# Patient Record
Sex: MALE | Race: WHITE | NOT HISPANIC OR LATINO | Employment: OTHER | URBAN - METROPOLITAN AREA
[De-identification: names, ages, dates, MRNs, and addresses within clinical notes are randomized per-mention and may not be internally consistent; named-entity substitution may affect disease eponyms.]

---

## 2017-02-13 ENCOUNTER — CLINICAL SUPPORT NO REQUIREMENTS (OUTPATIENT)
Dept: CARDIOLOGY | Facility: CLINIC | Age: 72
End: 2017-02-13

## 2017-02-13 DIAGNOSIS — Z95.818 PRESENCE OF CARDIAC DEVICE: Primary | ICD-10-CM

## 2017-02-13 PROCEDURE — 93298 REM INTERROG DEV EVAL SCRMS: CPT | Performed by: INTERNAL MEDICINE

## 2017-02-14 ENCOUNTER — TELEPHONE (OUTPATIENT)
Dept: CARDIOLOGY | Facility: CLINIC | Age: 72
End: 2017-02-14

## 2017-02-15 ENCOUNTER — TELEPHONE (OUTPATIENT)
Dept: CARDIOLOGY | Facility: CLINIC | Age: 72
End: 2017-02-15

## 2017-02-15 DIAGNOSIS — I48.19 ATRIAL FIBRILLATION, PERSISTENT (HCC): Primary | ICD-10-CM

## 2017-02-15 NOTE — TELEPHONE ENCOUNTER
Called spoke with patient. He is going to come tomorrow after work about 4:00 for EKG only and to get samples of Eliquis 5mg one tablet BID.     Please schedule for cardiovert in 3 weeks.

## 2017-02-15 NOTE — TELEPHONE ENCOUNTER
Is in persistent afib on Linq loop recorder,  Needs ECG.  CHADS2 is high given recent CVA.  Will need AC such as coumadin or eliquis.  Start on eliquis for the time being at 5 mg po bid.  Give samples.  Cr 0.8, 275 lb.    Consider cardiovert in 3 weeks.

## 2017-02-16 ENCOUNTER — CLINICAL SUPPORT (OUTPATIENT)
Dept: CARDIOLOGY | Facility: CLINIC | Age: 72
End: 2017-02-16

## 2017-02-16 DIAGNOSIS — I48.19 ATRIAL FIBRILLATION, PERSISTENT (HCC): ICD-10-CM

## 2017-02-16 NOTE — TELEPHONE ENCOUNTER
Orders entered for 24 holter and ECG.  Will call patient in AM to let him know updated plan of care and that depending on ECG and holter results, Dr. Gomez will further evaluate need for cardioversion.

## 2017-02-17 DIAGNOSIS — I48.0 PAROXYSMAL ATRIAL FIBRILLATION (HCC): Primary | ICD-10-CM

## 2017-02-17 PROCEDURE — 93000 ELECTROCARDIOGRAM COMPLETE: CPT | Performed by: INTERNAL MEDICINE

## 2017-02-17 RX ORDER — PROPAFENONE HYDROCHLORIDE 150 MG/1
150 TABLET, COATED ORAL 2 TIMES DAILY
Qty: 60 TABLET | Refills: 6 | Status: SHIPPED | OUTPATIENT
Start: 2017-02-17 | End: 2021-01-01

## 2017-02-18 NOTE — TELEPHONE ENCOUNTER
ECG today shows NSR.  No afib.  Will start low dose rythmol 150 mg po bid, ordered.  F/u ECG and Holter in one week.  Needs OV with me in next 2 weeks.  Can move somebody.

## 2017-02-18 NOTE — PROGRESS NOTES
Procedure     ECG 12 Lead  Date/Time: 2/17/2017 7:19 PM  Performed by: LAURE CORNELIUS JR  Authorized by: LAURE CORNELIUS JR   Comparison: compared with previous ECG   Similar to previous ECG  Rhythm: sinus rhythm  Ectopy: atrial premature contractions  BPM: 70  Conduction: right bundle branch block  Conduction comments: Left axis deviation  Clinical impression: abnormal ECG

## 2017-03-09 ENCOUNTER — CLINICAL SUPPORT (OUTPATIENT)
Dept: CARDIOLOGY | Facility: CLINIC | Age: 72
End: 2017-03-09

## 2017-03-09 ENCOUNTER — HOSPITAL ENCOUNTER (OUTPATIENT)
Dept: CARDIOLOGY | Facility: HOSPITAL | Age: 72
Discharge: HOME | End: 2017-03-09
Attending: INTERNAL MEDICINE | Admitting: INTERNAL MEDICINE

## 2017-03-09 DIAGNOSIS — I48.19 ATRIAL FIBRILLATION, PERSISTENT (HCC): ICD-10-CM

## 2017-03-09 PROCEDURE — 93226 XTRNL ECG REC<48 HR SCAN A/R: CPT

## 2017-03-09 PROCEDURE — 93225 XTRNL ECG REC<48 HRS REC: CPT

## 2017-03-14 PROCEDURE — 93227 XTRNL ECG REC<48 HR R&I: CPT | Performed by: INTERNAL MEDICINE

## 2017-03-17 ENCOUNTER — OFFICE VISIT (OUTPATIENT)
Dept: CARDIOLOGY | Facility: CLINIC | Age: 72
End: 2017-03-17

## 2017-03-17 VITALS
HEIGHT: 71 IN | WEIGHT: 273 LBS | HEART RATE: 74 BPM | BODY MASS INDEX: 38.22 KG/M2 | DIASTOLIC BLOOD PRESSURE: 78 MMHG | SYSTOLIC BLOOD PRESSURE: 152 MMHG

## 2017-03-17 DIAGNOSIS — R53.82 CHRONIC FATIGUE: ICD-10-CM

## 2017-03-17 DIAGNOSIS — I10 ESSENTIAL HYPERTENSION: ICD-10-CM

## 2017-03-17 DIAGNOSIS — I69.30 CHRONIC ISCHEMIC RIGHT MCA STROKE: ICD-10-CM

## 2017-03-17 DIAGNOSIS — R06.02 BREATH SHORTNESS: ICD-10-CM

## 2017-03-17 DIAGNOSIS — E78.5 HYPERLIPIDEMIA, UNSPECIFIED HYPERLIPIDEMIA TYPE: Primary | ICD-10-CM

## 2017-03-17 DIAGNOSIS — G47.33 OBSTRUCTIVE SLEEP APNEA SYNDROME: ICD-10-CM

## 2017-03-17 DIAGNOSIS — I87.8 VENOUS STASIS: ICD-10-CM

## 2017-03-17 PROBLEM — N20.0 CALCULUS OF KIDNEY: Status: ACTIVE | Noted: 2017-03-17

## 2017-03-17 PROCEDURE — 99214 OFFICE O/P EST MOD 30 MIN: CPT | Performed by: INTERNAL MEDICINE

## 2017-03-17 RX ORDER — FUROSEMIDE 40 MG/1
TABLET ORAL
COMMUNITY
Start: 2012-03-15 | End: 2017-03-17

## 2017-03-17 NOTE — PROGRESS NOTES
[facility]  Cardiology Office Visit Note        Subjective:     Encounter Date:2017      Patient ID: Jesus Izaguirre Jr.   Age: 71 y.o.  Sex: male  :  1945  MRN: [id number]             Date of Office Visit: 2017  Encounter Provider: Wolfgang Gomez MD  Place of Service: [facility]     .    Chief Complaint:  History of Present Illness    The following portions of the patient's history were reviewed and updated as appropriate: allergies, current medications, past family history, past medical history, past social history, past surgical history and problem list.    Review of Systems   Constitution: Negative for chills, fever and weight gain.   HENT: Negative for congestion, headaches, hearing loss and sore throat.    Eyes: Negative for blurred vision and double vision.   Cardiovascular: Negative for chest pain, claudication, cyanosis, dyspnea on exertion, irregular heartbeat, leg swelling, near-syncope, orthopnea, palpitations, paroxysmal nocturnal dyspnea and syncope.   Respiratory: Positive for shortness of breath and snoring. Negative for cough and wheezing.    Endocrine: Negative for cold intolerance.   Hematologic/Lymphatic: Negative for adenopathy. Does not bruise/bleed easily.   Skin: Negative for rash.   Musculoskeletal: Negative for back pain.   Gastrointestinal: Negative for abdominal pain, change in bowel habit, constipation, diarrhea, nausea and vomiting.   Genitourinary: Negative for dysuria, frequency, hematuria and hesitancy.   Neurological: Negative for disturbances in coordination, excessive daytime sleepiness, dizziness, focal weakness, light-headedness, loss of balance and numbness.   Psychiatric/Behavioral: Negative for depression and memory loss. The patient is not nervous/anxious.    Allergic/Immunologic: Negative for hives.       Procedures               HPI     The patient is a 71-year-old white male status post  ischemic stroke with mild hemorrhagic conversion resulting in left hemiparesis and dysphagia in December 2014 now with no residual, history of hypertension, diabetes, hyperlipidemia, sleep apnea presents for cardiac follow-up.  I last saw him in the office in August 2015.  Since then, he denies chest pain.  No PND, orthopnea or pedal edema.  He uses CPAP for sleep apnea every night.  No palpitations dizziness or syncope.    He was found to be in atrial fibrillation with controlled ventricular response.  He was put on Eliquis.  He was then put on Rythmol 150 mg by mouth twice a day after was known that he converted spontaneously back to sinus rhythm.  Clinically he has remained in sinus rhythm.   His main complaint disease of fatigability with exertion, mainly when he walks a long distance, such as when he went to the fair recently.  He sat down and rested and that his energy back.  He does not exercise mainly because his knees hurt.  His primary care physician has been injecting his knees with steroids and they feel better but then they get bad again.    Cardiac risk factors: Positive for hypertension, hyperlipidemia and diabetes.  No smoking since December 2014.  No family history of early CAD.          Past Medical History   Diagnosis Date   • Asthma    • BPH (benign prostatic hypertrophy)    • Diabetes mellitus    • Fatigue    • HLD (hyperlipidemia) 7/5/2016   • Hypertension    • Nephrolithiasis    • Stroke 12/2015   • Venous insufficiency        Past Surgical History   Procedure Laterality Date   • Lithotripsy       Renal       Social History     Social History   • Marital status:      Spouse name: N/A   • Number of children: N/A   • Years of education: N/A     Occupational History   • Not on file.     Social History Main Topics   • Smoking status: Former Smoker     Packs/day: 1.50   • Smokeless tobacco: Not on file      Comment: Cessation 12/2014; HX of 1 ppd for 52 yrs.   • Alcohol use No   • Drug use:  "No   • Sexual activity: Not on file     Other Topics Concern   • Not on file     Social History Narrative       Family History   Problem Relation Age of Onset   • Arthritis Mother    • Cancer Mother    • Migraines Mother    • Hypertension Other            Scheduled Meds:  Current Outpatient Prescriptions on File Prior to Visit   Medication Sig Dispense Refill   • amLODIPine-benazepril (LOTREL) 10-40 MG per capsule Take 1 capsule by mouth daily. 90 capsule 3   • aspirin 325 MG tablet Take 1 tablet by mouth daily.     • atorvastatin (LIPITOR) 80 MG tablet TAKE ONE TABLET BY MOUTH EVERY NIGHT AT BEDTIME 90 tablet 1   • furosemide (LASIX) 40 MG tablet TAKE ONE TABLET BY MOUTH DAILY 90 tablet 1   • glucose blood test strip Amber Contour Next Test In Vitro Strip; Patient Sig: Amber Contour Next Test In Vitro Strip TEST ONCE DAILY; 50; 3; 04-Mar-2015; Active     • HYDROcodone-acetaminophen (NORCO) 5-325 MG per tablet Take 1 tablet by mouth every 6 (six) hours as needed for moderate pain (4-6). 20 tablet 0   • metFORMIN (GLUCOPHAGE) 500 MG tablet TAKE ONE TABLET BY MOUTH TWICE A DAY WITH MEALS 180 tablet 1   • propafenone (RYTHMOL) 150 MG tablet Take 1 tablet by mouth 2 (Two) Times a Day. 60 tablet 6   • vitamin B-12 (CYANOCOBALAMIN) 1000 MCG tablet Take 1,000 mcg by mouth daily.     • vitamin D (CHOLECALCIFEROL) 400 UNITS tablet Take 400 Units by mouth daily.       No current facility-administered medications on file prior to visit.        Visit Vitals   • /78   • Pulse 74   • Ht 71\" (180.3 cm)   • Wt 273 lb (124 kg)   • BMI 38.08 kg/m2       Objective:     Physical Exam   Constitutional: He is oriented to person, place, and time. He appears well-developed and well-nourished. No distress.   HENT:   Head: Normocephalic and atraumatic.   Right Ear: External ear normal.   Left Ear: External ear normal.   Mouth/Throat: Oropharynx is clear and moist. No oropharyngeal exudate.   Eyes: Conjunctivae and EOM are normal. " Pupils are equal, round, and reactive to light. No scleral icterus.   Neck: Normal range of motion. Neck supple. No JVD present. No tracheal deviation present. No thyromegaly present.   Cardiovascular: Normal rate, regular rhythm, S1 normal, S2 normal, normal heart sounds and intact distal pulses.  PMI is not displaced.  Exam reveals no gallop, no distant heart sounds, no friction rub and no decreased pulses.    No murmur heard.  Pulmonary/Chest: Effort normal and breath sounds normal. No accessory muscle usage. No respiratory distress. He has no wheezes. He has no rales. He exhibits no tenderness.   Abdominal: Soft. Bowel sounds are normal. He exhibits no distension and no mass. There is no tenderness. There is no rebound and no guarding.   Musculoskeletal: Normal range of motion. He exhibits no edema, tenderness or deformity.   Lymphadenopathy:     He has no cervical adenopathy.   Neurological: He is alert and oriented to person, place, and time. He has normal reflexes. No cranial nerve deficit. Coordination normal.   Skin: Skin is dry. No rash noted. He is not diaphoretic. No erythema. No pallor.   Psychiatric: He has a normal mood and affect.             Lab Review:               Lab Review:         Lab Review     No results found for: CHOL  Lab Results   Component Value Date    HDL 52 08/22/2016    HDL 48 08/10/2015    HDL 59 04/01/2015     Lab Results   Component Value Date    LDL 48 08/22/2016    LDL 43 08/10/2015    LDL 43 04/01/2015     Lab Results   Component Value Date    TRIG 68 08/22/2016    TRIG 46 08/10/2015    TRIG 50 04/01/2015     No components found for: CHOLHDL  Lab Results   Component Value Date    GLUCOSE 178 (H) 01/02/2015    BUN 21 08/22/2016    CREATININE 0.91 08/22/2016    EGFRIFNONA 84 08/22/2016    EGFRIFAFRI 98 08/22/2016    BCR 23 (H) 08/22/2016    CO2 24 08/22/2016    CALCIUM 9.1 08/22/2016    PROTENTOTREF 6.4 08/22/2016    ALBUMIN 4.0 08/22/2016    LABIL2 1.7 08/22/2016    AST 20  08/22/2016    ALT 29 08/22/2016     Lab Results   Component Value Date    GLUCOSE 178 (H) 01/02/2015    CALCIUM 9.1 08/22/2016     08/22/2016    K 4.3 08/22/2016    CO2 24 08/22/2016     08/22/2016    BUN 21 08/22/2016    CREATININE 0.91 08/22/2016    EGFRIFAFRI 98 08/22/2016    EGFRIFNONA 84 08/22/2016    BCR 23 (H) 08/22/2016     Lab Results   Component Value Date    WBC 8.88 01/01/2015    HGB 7.1 08/22/2016    HCT 46.1 01/01/2015    MCV 88.8 01/01/2015     01/01/2015     No results found for: DDIMER  Lab Results   Component Value Date    TSH 1.580 08/22/2016     No results found for: CKTOTAL  No results found for: DIGOXIN  No results found for: CKTOTAL, CKMB, CKMBINDEX, TROPONINI, TROPONINT  Lab Results   Component Value Date    INR 1.1 01/01/2015    INR 1.0 12/26/2014    PROTIME 12.5 01/01/2015    PROTIME 11.1 12/26/2014     CrCl cannot be calculated (Patient has no serum creatinine result on file.).    Assessment:          Diagnosis Plan   1. Hyperlipidemia, unspecified hyperlipidemia type     2. Essential hypertension     3. Venous stasis     4. Breath shortness     5. Obstructive sleep apnea syndrome     6. Chronic ischemic right MCA stroke     7. Chronic fatigue            Assessment and Plan:    Jesus was seen today for hypertension, hyperlipidemia and slow heart rate.    Diagnoses and all orders for this visit:    Hyperlipidemia, unspecified hyperlipidemia type    Essential hypertension    Venous stasis    Breath shortness    Obstructive sleep apnea syndrome    Chronic ischemic right MCA stroke    Chronic fatigue       The patient is complaining of easy fatigability.  I don't think his symptoms are due to sleep apnea as he wears the CPAP mask religiously.  I think he is out of shape and I told him he needs to exercise more.  I told that exercise means walking at a brisk pace continuously, with a goal exercise 3 times a week at 20 minutes per time.  He could either do the stationary bike  or the treadmill which he has at home.    The patient was recently found to be in atrial fibrillation.  His rate is now controlled in sinus rhythm.  He is not on an antiarrhythmic agent.  A week ago, he was started on Rythmol 150 mg by mouth twice a day to control his rhythm.  His QRS duration is normal.     He'll have an Holter done in 3 weeks.  If he remains in sinus rhythm then I will stop the Eliquis.  He does say that when he started Eliquis he felt better right away.  I think he converted on that day to sinus rhythm. I told him to check his blood pressure and heart rate daily.  I will obtain a Holter monitor in 3 weeks.  If he remains in sinus rhythm without atrial fibrillation then I will stop his Eliquis.  Once we stop his Eliquis, I will put him on low-dose aspirin.    His last lipid profile was excellent.    His blood pressures mildly elevated today.  It is usually in a normal range.      A total of 25 minutes was spent in the care of this patient, including at least 13 minutes face-to-face with the patient.    I not only counseled the patient today on the significant factors noted in the assessment and plan, but I also recommended that the patient reduce salt and saturated animal fat intake in diet, as well as to perform scheduled exercise on a regular basis.      Plan:                  03/17/2017  6:35 PM  MD Wolfgang Marquez MD  3/17/2017, 6:35 PM    EMR Dragon/Transcription disclaimer:   Much of this encounter note is an electronic transcription/translation of spoken language to printed text. The electronic translation of spoken language may permit erroneous, or at times, nonsensical words or phrases to be inadvertently transcribed; Although I have reviewed the note for such errors, some may still exist.

## 2017-03-27 ENCOUNTER — TELEPHONE (OUTPATIENT)
Dept: CARDIOLOGY | Facility: CLINIC | Age: 72
End: 2017-03-27

## 2017-03-27 NOTE — TELEPHONE ENCOUNTER
Holter:  NSR with frequent PACs and PVCs.  One 5-beat run of slow VTach.  No afib on rythmol and eliquis.  No change meds.              Study Description  Monitor hooked-up on 3/9/2017 at 15:46 EST. The monitor was scanned on 3/10/2017. The patient was monitored for 24 hours. Indications for this exam include afib. Total beats: 14596. Average HR: 65. Min HR: 42. Max HR: 124.       Study Findings  No symptoms reported during the monitoring period. No complications noted. The predominant rhythm noted during the testing period was sinus rhythm. Premature atrial contractions occured frequently. There were 288 premature atrial contractions per hour. There was no evidence of atrial arrhythmias. There were no episodes of supraventricular tachycardia. Premature ventricular contractions occured frequently. There were 83 premature ventricular contractions per hour. Ventricular couplets. There was one episodes of ventricular tachycardia. The peak heart rate was 122 beats per minute. One 5-bt run VT.. Sinoatrial node conduction was normal. No atrioventricular block noted.

## 2017-04-07 RX ORDER — ATORVASTATIN CALCIUM 80 MG/1
TABLET, FILM COATED ORAL
Qty: 90 TABLET | Refills: 3 | Status: SHIPPED | OUTPATIENT
Start: 2017-04-07 | End: 2017-09-26

## 2017-05-01 ENCOUNTER — LAB (OUTPATIENT)
Dept: LAB | Facility: HOSPITAL | Age: 72
End: 2017-05-01

## 2017-05-01 ENCOUNTER — OFFICE VISIT (OUTPATIENT)
Dept: NEUROLOGY | Facility: CLINIC | Age: 72
End: 2017-05-01

## 2017-05-01 VITALS
SYSTOLIC BLOOD PRESSURE: 150 MMHG | WEIGHT: 274 LBS | HEIGHT: 71 IN | DIASTOLIC BLOOD PRESSURE: 74 MMHG | HEART RATE: 60 BPM | BODY MASS INDEX: 38.36 KG/M2 | OXYGEN SATURATION: 98 %

## 2017-05-01 DIAGNOSIS — E53.8 VITAMIN B12 DEFICIENCY: ICD-10-CM

## 2017-05-01 DIAGNOSIS — E55.9 VITAMIN D DEFICIENCY: ICD-10-CM

## 2017-05-01 DIAGNOSIS — R41.3 MEMORY IMPAIRMENT: ICD-10-CM

## 2017-05-01 DIAGNOSIS — I69.30 CHRONIC ISCHEMIC RIGHT MCA STROKE: ICD-10-CM

## 2017-05-01 LAB
25(OH)D3 SERPL-MCNC: 12.4 NG/ML (ref 30–100)
FOLATE SERPL-MCNC: 10.81 NG/ML (ref 4.78–24.2)
TSH SERPL DL<=0.05 MIU/L-ACNC: 1.19 MIU/ML (ref 0.27–4.2)
VIT B12 BLD-MCNC: 354 PG/ML (ref 211–946)

## 2017-05-01 PROCEDURE — 82607 VITAMIN B-12: CPT

## 2017-05-01 PROCEDURE — 36415 COLL VENOUS BLD VENIPUNCTURE: CPT

## 2017-05-01 PROCEDURE — 83921 ORGANIC ACID SINGLE QUANT: CPT | Performed by: PSYCHIATRY & NEUROLOGY

## 2017-05-01 PROCEDURE — 82746 ASSAY OF FOLIC ACID SERUM: CPT

## 2017-05-01 PROCEDURE — 84443 ASSAY THYROID STIM HORMONE: CPT

## 2017-05-01 PROCEDURE — 99214 OFFICE O/P EST MOD 30 MIN: CPT | Performed by: PSYCHIATRY & NEUROLOGY

## 2017-05-01 PROCEDURE — 82306 VITAMIN D 25 HYDROXY: CPT | Performed by: PSYCHIATRY & NEUROLOGY

## 2017-05-02 ENCOUNTER — TELEPHONE (OUTPATIENT)
Dept: NEUROLOGY | Facility: CLINIC | Age: 72
End: 2017-05-02

## 2017-05-04 LAB — METHYLMALONATE SERPL-SCNC: 250 NMOL/L (ref 0–378)

## 2017-05-15 ENCOUNTER — CLINICAL SUPPORT NO REQUIREMENTS (OUTPATIENT)
Dept: CARDIOLOGY | Facility: CLINIC | Age: 72
End: 2017-05-15

## 2017-05-15 DIAGNOSIS — Z95.818 PRESENCE OF CARDIAC DEVICE: Primary | ICD-10-CM

## 2017-05-15 PROCEDURE — 93298 REM INTERROG DEV EVAL SCRMS: CPT | Performed by: INTERNAL MEDICINE

## 2017-08-15 ENCOUNTER — CLINICAL SUPPORT NO REQUIREMENTS (OUTPATIENT)
Dept: CARDIOLOGY | Facility: CLINIC | Age: 72
End: 2017-08-15

## 2017-08-15 DIAGNOSIS — Z95.818 PRESENCE OF CARDIAC DEVICE: Primary | ICD-10-CM

## 2017-08-15 PROCEDURE — 93298 REM INTERROG DEV EVAL SCRMS: CPT | Performed by: INTERNAL MEDICINE

## 2017-09-21 ENCOUNTER — OFFICE VISIT (OUTPATIENT)
Dept: FAMILY MEDICINE CLINIC | Facility: CLINIC | Age: 72
End: 2017-09-21

## 2017-09-21 VITALS
BODY MASS INDEX: 38.63 KG/M2 | WEIGHT: 275.9 LBS | TEMPERATURE: 98.6 F | HEART RATE: 58 BPM | RESPIRATION RATE: 14 BRPM | SYSTOLIC BLOOD PRESSURE: 122 MMHG | OXYGEN SATURATION: 99 % | DIASTOLIC BLOOD PRESSURE: 68 MMHG | HEIGHT: 71 IN

## 2017-09-21 DIAGNOSIS — I10 ESSENTIAL HYPERTENSION: ICD-10-CM

## 2017-09-21 DIAGNOSIS — B35.1 ONYCHOMYCOSIS: ICD-10-CM

## 2017-09-21 DIAGNOSIS — E11.42 CONTROLLED TYPE 2 DIABETES MELLITUS WITH DIABETIC POLYNEUROPATHY, WITHOUT LONG-TERM CURRENT USE OF INSULIN (HCC): ICD-10-CM

## 2017-09-21 DIAGNOSIS — Z23 FLU VACCINE NEED: ICD-10-CM

## 2017-09-21 DIAGNOSIS — E78.5 HYPERLIPIDEMIA, UNSPECIFIED HYPERLIPIDEMIA TYPE: Primary | ICD-10-CM

## 2017-09-21 DIAGNOSIS — N40.1 BENIGN PROSTATIC HYPERPLASIA WITH LOWER URINARY TRACT SYMPTOMS, UNSPECIFIED MORPHOLOGY: ICD-10-CM

## 2017-09-21 DIAGNOSIS — G62.9 NEUROPATHY: ICD-10-CM

## 2017-09-21 PROCEDURE — G0008 ADMIN INFLUENZA VIRUS VAC: HCPCS | Performed by: FAMILY MEDICINE

## 2017-09-21 PROCEDURE — 99214 OFFICE O/P EST MOD 30 MIN: CPT | Performed by: FAMILY MEDICINE

## 2017-09-21 PROCEDURE — 90662 IIV NO PRSV INCREASED AG IM: CPT | Performed by: FAMILY MEDICINE

## 2017-09-21 RX ORDER — ASPIRIN 325 MG
325 TABLET ORAL DAILY
Qty: 90 TABLET | Refills: 3 | Status: SHIPPED | OUTPATIENT
Start: 2017-09-21

## 2017-09-21 NOTE — PROGRESS NOTES
Subjective   Jesus Izaguirre Jr. is a 72 y.o. male.     Chief Complaint   Patient presents with   • Follow-up     Patient has 6 months follow up. Patient needs to check prostate.    • Diabetes     Patient needs to check on it.    • Med Refill     Patient needs a refill on aspirin 325 m.        HPI     Patient is here to follow-up on diabetes, hypertension, hyperlipidemia.  Patient is currently taking metformin 500 mg daily despite being prescribed twice a day.  Patient states that he's had some increase in his diabetic symptoms.  He has noticed that he's been urinating a lot more and has also developed some neuropathy in his feet.  Hypertension is currently stable and well controlled on amlodipine/benazepril.  Blood pressure today 122/68.  Patient also takes atorvastatin 80 mg daily for hyperlipidemia..    The following portions of the patient's history were reviewed and updated as appropriate: allergies, current medications, past family history, past medical history, past social history, past surgical history and problem list.    Review of Systems   Constitutional: Negative for activity change.   All other systems reviewed and are negative.      Objective  Vitals:    09/21/17 0953   BP: 122/68   Pulse: 58   Resp: 14   Temp: 98.6 °F (37 °C)   SpO2: 99%        Physical Exam   Constitutional: He is oriented to person, place, and time. He appears well-developed and well-nourished. No distress.   HENT:   Head: Normocephalic and atraumatic.   Right Ear: External ear normal.   Left Ear: External ear normal.   Nose: Nose normal.   Mouth/Throat: Oropharynx is clear and moist.   Eyes: Conjunctivae and EOM are normal. Pupils are equal, round, and reactive to light. Right eye exhibits no discharge. Left eye exhibits no discharge. No scleral icterus.   Cardiovascular: Normal rate, regular rhythm and normal heart sounds.    Pulmonary/Chest: Effort normal and breath sounds normal. No respiratory distress. He has no wheezes. He has  no rales.   Abdominal: Soft. Bowel sounds are normal. He exhibits no distension. There is no tenderness.    Jesus had a diabetic foot exam performed today.   During the foot exam he had a monofilament test performed.    Neurological Sensory Findings - Unaltered hot/cold right ankle/foot discrimination and unaltered hot/cold left ankle/foot discrimination. Unaltered sharp/dull right ankle/foot discrimination and unaltered sharp/dull left ankle/foot discrimination. No right ankle/foot altered proprioception and no left ankle/foot altered proprioception   Skin Integrity  -  He has right foot onychomycosis.He has left foot onychomycosis..  Neurological: He is alert and oriented to person, place, and time.   Skin: Skin is warm and dry. He is not diaphoretic.   Nursing note and vitals reviewed.        Current Outpatient Prescriptions:   •  amLODIPine-benazepril (LOTREL) 10-40 MG per capsule, Take 1 capsule by mouth daily., Disp: 90 capsule, Rfl: 3  •  aspirin 325 MG tablet, Take 1 tablet by mouth Daily., Disp: 90 tablet, Rfl: 3  •  atorvastatin (LIPITOR) 80 MG tablet, TAKE ONE TABLET BY MOUTH EVERY NIGHT AT BEDTIME, Disp: 90 tablet, Rfl: 1  •  furosemide (LASIX) 40 MG tablet, TAKE ONE TABLET BY MOUTH DAILY, Disp: 90 tablet, Rfl: 1  •  metFORMIN (GLUCOPHAGE) 500 MG tablet, TAKE ONE TABLET BY MOUTH TWICE A DAY WITH MEALS, Disp: 180 tablet, Rfl: 1  •  propafenone (RYTHMOL) 150 MG tablet, Take 1 tablet by mouth 2 (Two) Times a Day., Disp: 60 tablet, Rfl: 6  •  vitamin B-12 (CYANOCOBALAMIN) 1000 MCG tablet, Take 1,000 mcg by mouth daily., Disp: , Rfl:   •  vitamin D (CHOLECALCIFEROL) 400 UNITS tablet, Take 400 Units by mouth daily., Disp: , Rfl:   •  atorvastatin (LIPITOR) 80 MG tablet, TAKE ONE TABLET BY MOUTH EVERY NIGHT AT BEDTIME, Disp: 90 tablet, Rfl: 3  •  glucose blood test strip, Amber Contour Next Test In Vitro Strip; Patient Sig: Amber Contour Next Test In Vitro Strip TEST ONCE DAILY; 50; 3; 04-Mar-2015; Active,  Disp: , Rfl:   •  HYDROcodone-acetaminophen (NORCO) 5-325 MG per tablet, Take 1 tablet by mouth every 6 (six) hours as needed for moderate pain (4-6)., Disp: 20 tablet, Rfl: 0    Procedures    Lab Results (most recent)     None          Assessment/Plan   Jesus was seen today for follow-up, diabetes and med refill.    Diagnoses and all orders for this visit:    Hyperlipidemia, unspecified hyperlipidemia type  -     Lipid Panel    Essential hypertension  -     Comprehensive Metabolic Panel    Controlled type 2 diabetes mellitus with diabetic polyneuropathy, without long-term current use of insulin  -     Comprehensive Metabolic Panel  -     Hemoglobin A1c  -     Ambulatory Referral to Podiatry    Benign prostatic hyperplasia with lower urinary tract symptoms, unspecified morphology  -     PSA    Flu vaccine need  -     Flu Vaccine High Dose PF 65YR+    Neuropathy  -     Ambulatory Referral to Podiatry    Onychomycosis  -     Ambulatory Referral to Podiatry    Other orders  -     aspirin 325 MG tablet; Take 1 tablet by mouth Daily.    We'll get labs as above to evaluate current status of medical problems.  I'm concerned that his diabetes is become uncontrolled.  We will refer to podiatry.    Return in about 4 weeks (around 10/19/2017) for Recheck.      Shailesh Souza MD

## 2017-09-22 LAB
ALBUMIN SERPL-MCNC: 3.7 G/DL (ref 3.5–4.8)
ALBUMIN/GLOB SERPL: 1.4 {RATIO} (ref 1.2–2.2)
ALP SERPL-CCNC: 105 IU/L (ref 39–117)
ALT SERPL-CCNC: 21 IU/L (ref 0–44)
AST SERPL-CCNC: 15 IU/L (ref 0–40)
BILIRUB SERPL-MCNC: 0.3 MG/DL (ref 0–1.2)
BUN SERPL-MCNC: 27 MG/DL (ref 8–27)
BUN/CREAT SERPL: 24 (ref 10–24)
CALCIUM SERPL-MCNC: 9.1 MG/DL (ref 8.6–10.2)
CHLORIDE SERPL-SCNC: 99 MMOL/L (ref 96–106)
CHOLEST SERPL-MCNC: 111 MG/DL (ref 100–199)
CO2 SERPL-SCNC: 21 MMOL/L (ref 18–29)
CREAT SERPL-MCNC: 1.12 MG/DL (ref 0.76–1.27)
GLOBULIN SER CALC-MCNC: 2.6 G/DL (ref 1.5–4.5)
GLUCOSE SERPL-MCNC: 346 MG/DL (ref 65–99)
HBA1C MFR BLD: 11.9 % (ref 4.8–5.6)
HDLC SERPL-MCNC: 49 MG/DL
LDLC SERPL CALC-MCNC: 47 MG/DL (ref 0–99)
POTASSIUM SERPL-SCNC: 4.6 MMOL/L (ref 3.5–5.2)
PROT SERPL-MCNC: 6.3 G/DL (ref 6–8.5)
PSA SERPL-MCNC: 2.4 NG/ML (ref 0–4)
SODIUM SERPL-SCNC: 138 MMOL/L (ref 134–144)
TRIGL SERPL-MCNC: 77 MG/DL (ref 0–149)
VLDLC SERPL CALC-MCNC: 15 MG/DL (ref 5–40)

## 2017-09-26 ENCOUNTER — OFFICE VISIT (OUTPATIENT)
Dept: NEUROLOGY | Facility: CLINIC | Age: 72
End: 2017-09-26

## 2017-09-26 VITALS
HEIGHT: 71 IN | SYSTOLIC BLOOD PRESSURE: 148 MMHG | DIASTOLIC BLOOD PRESSURE: 78 MMHG | OXYGEN SATURATION: 97 % | BODY MASS INDEX: 37.66 KG/M2 | HEART RATE: 76 BPM | WEIGHT: 269 LBS

## 2017-09-26 DIAGNOSIS — I69.30 CHRONIC ISCHEMIC RIGHT MCA STROKE: ICD-10-CM

## 2017-09-26 DIAGNOSIS — E78.5 HYPERLIPIDEMIA, UNSPECIFIED HYPERLIPIDEMIA TYPE: ICD-10-CM

## 2017-09-26 DIAGNOSIS — E11.8 TYPE 2 DIABETES MELLITUS WITH COMPLICATION, WITHOUT LONG-TERM CURRENT USE OF INSULIN (HCC): ICD-10-CM

## 2017-09-26 DIAGNOSIS — E55.9 VITAMIN D DEFICIENCY: ICD-10-CM

## 2017-09-26 DIAGNOSIS — E53.8 VITAMIN B12 DEFICIENCY: ICD-10-CM

## 2017-09-26 DIAGNOSIS — I10 ESSENTIAL HYPERTENSION: ICD-10-CM

## 2017-09-26 DIAGNOSIS — Z72.0 TOBACCO ABUSE: ICD-10-CM

## 2017-09-26 PROCEDURE — 99214 OFFICE O/P EST MOD 30 MIN: CPT | Performed by: NURSE PRACTITIONER

## 2017-09-26 NOTE — PROGRESS NOTES
"DOS: 2017  NAME: Jesus Izaguirre Jr.   : 1945  PCP: Shailesh Souza MD    Chief Complaint   Patient presents with   • Stroke        Neurological Problem and Interval History:  72 y.o. RHW male with HTN, DM, tobacco abuse (1PPD), YEISON and stroke. He presents today for his 6 month stroke and memory loss follow up, first encounter.     He denies any new S/S of stroke and no H/As. He has not ha falls.     2014 he woke up and noted he didn't feel right. He had difficulty walking, off balance. Then he tried talking to his wife but his words were coming out slurred. He came to the hospital and after a few days he felt better but he did have out patient ST and PT. He was on a baby ASA at the time of the event. He was D/C on full dose ASA and Lipitor. After his stroke his ST memory got worse. He is otherwise back to his baseline able to manage his own finances and take care of himself.     He has a LINQ placed and was told by Dr. Granados that he goes in to [facility] at night but only for about 20 minutes and it is not a \"big deal.\" He also notes that Dr. Granados placed him on Eliquis but this was too expensive and he needs to FU with Dr. Granados to discuss a different blood thinner. He had another cognitive test in spring of 2017 and his memory had improved since his stroke. He has been taking B12 for this per Dr. Azar. He has not had this retested since May 2017. His BS at home has been around 300. It used to be 's. He recently FU with Dr. Souza and had lab work. He will FU in 30 days to discuss change in medications. His BP is typically high 150's/90. He did not have his BP medications this morning. He notices when his BP is high, he gets a warm tingling fleeing all over.   He is a retired .     Review of Systems:        Review of Systems   Constitutional: Negative for chills, fatigue and fever.   HENT: Negative for hearing loss, tinnitus and trouble swallowing.    Respiratory: Positive for " "apnea. Negative for cough, shortness of breath and wheezing.    Cardiovascular: Positive for leg swelling. Negative for chest pain and palpitations.   Gastrointestinal: Negative for constipation, diarrhea, nausea and vomiting.   Endocrine: Negative for cold intolerance, heat intolerance and polydipsia.   Genitourinary: Negative for decreased urine volume, difficulty urinating, frequency and urgency.   Musculoskeletal: Negative for back pain, gait problem, joint swelling, neck pain and neck stiffness.   Skin: Negative for rash and wound.   Allergic/Immunologic: Negative for environmental allergies and food allergies.   Neurological: Negative for dizziness, tremors, seizures, syncope, facial asymmetry, speech difficulty, weakness, light-headedness and numbness.   Hematological: Negative for adenopathy. Does not bruise/bleed easily.   Psychiatric/Behavioral: Positive for sleep disturbance. Negative for agitation, behavioral problems, confusion, decreased concentration, dysphoric mood, hallucinations, self-injury and suicidal ideas. The patient is not nervous/anxious and is not hyperactive.        \"The following portions of the patient's history were reviewed and updated as appropriate: allergies, current medications, past family history, past medical history, past social history, past surgical history and problem list.\"  Review and Interpretation of Imagin14 PAIGE POSTOPERATIVE DIAGNOSIS:  No obvious cardiac source of embolus.  No patent foramen ovale.   2014 MRI: small RMCA stroke  2014 CTA: bilateral carotid bulb disease/stenosis ~50%     Laboratory Results:             Lab Results   Component Value Date    HGBA1C 11.9 (H) 2017     Lab Results   Component Value Date    HDL 49 2017    HDL 52 2016    HDL 48 08/10/2015     Lab Results   Component Value Date    LDL 47 2017    LDL 48 2016    LDL 43 08/10/2015     Lab Results   Component Value Date    TRIG 77 2017    TRIG " 68 08/22/2016    TRIG 46 08/10/2015   No results found for: RPR  Lab Results   Component Value Date    TSH 1.190 05/01/2017     Lab Results   Component Value Date    ECUBRUHU09 354 05/01/2017       Physical Examination: mRS: Cheryl  General Appearance:   Well developed, morbidly obese, well groomed, alert, and cooperative.  HEENT: Normocephalic.    Neck and Spine: Normal range of motion.  Normal alignment. No mass or tenderness. No bruits.  Cardiac: Regular rate and rhythm. No murmurs.  Peripheral Vasculature: Radial and pedal pulses are equal and symmetric. No signs of distal embolization.  Extremities:    No edema or deformities.       Neurological examination:  Higher Integrative  Function: Oriented to time, place and person. Normal registration, attention span and concentration. Normal language including comprehension, spontaneous speech, repetition, naming and vocabulary. No neglect with normal visual-spatial function and construction. Normal fund of knowledge and higher integrative function.  CN III IV VI: Extraocular movements are full without nystagmus.   CN V: Normal facial sensation and strength of muscles of mastication.  CN VII: Facial movements are symmetric. No weakness.  CN VIII:   Auditory acuity is normal.  CN IX & X:   Symmetric palatal movement.  CN XI: Sternocleidomastoid and trapezius are normal.  No weakness.  CN XII:   The tongue is midline.  No atrophy or fasciculations.  Motor: Normal muscle strength, bulk and tone in upper and lower extremities.  Tremulousness of both hands   Sensation: Normal to light touch, temperature, and proprioception in arms and legs.   Station and Gait: Normal gait and station.    Coordination: Rapid alternating movements are normal.  Heel to shin normal.    Diagnoses / Discussion:  Mr. Izaguirre is a 71yo, new to me, who presented for his 6 month stroke and cognitive follow up. He is 3 years post RMCA infract, etiology unknown. He does have a LINQ and states there has been  some atrial fib. I will get the records from Dr. Granados's office. If there is evidence of atrial fib we recommend anticoagulation. I have discussed this with the patient and he voices understanding. He also needs aggressive risk factor control. I have encouraged him to discuss his BP with Dr. Granados also, it should be <125/80 all of the time. His DM is being managed by Dr. Souza. His cholesterol is being controlled, on Lipitor 80mg. He voiced and we discussed that he has to quit smoking and he wants to. I will discuss the need for repeat vascular imaging with Dr. Edgar, CTA vs carotid U/S, ? Continue antiplatelets with anticoagulation for atherosclerosis. Regarding his memory trouble, this is stable, but I will recheck his Vitamin D and B12 and determine the need for any dose adjustments. I will also go ahead and check to make sure he is responding to the full dose ASA, he was on 81mg ASA at the time of his event in 2014, ? Need for plavix. He needs to continue to modify his diet and stay active. He agrees with the plan and will call with any questions or concerns.     Plan:   Get records from Dr. Granados, ADRIANOQ reports    Recheck vitamin B12, vitamin D, ASA response   Tobacco cessation education 5 min   ? Repeat imaging, CTA or carotid US, will discuss with Dr. Edgar   Aggressive Blood pressure control to <130/80   Goal LDL <70, continue Lipitor 80mg    Serum glucose < 140   Call 911 for stroke any stroke symptoms   Follow-up 6 months   Jesus was seen today for stroke.    Diagnoses and all orders for this visit:    Chronic ischemic right MCA stroke  -     Platelet Response Aspirin; Future    Hyperlipidemia, unspecified hyperlipidemia type    Essential hypertension    Vitamin B12 deficiency  -     Vitamin B12; Future    Vitamin D deficiency  -     Vitamin D 25 Hydroxy; Future    Type 2 diabetes mellitus with complication, without long-term current use of insulin    Tobacco abuse      Coding

## 2017-10-01 ENCOUNTER — RESULTS ENCOUNTER (OUTPATIENT)
Dept: NEUROLOGY | Facility: CLINIC | Age: 72
End: 2017-10-01

## 2017-10-01 DIAGNOSIS — I69.30 CHRONIC ISCHEMIC RIGHT MCA STROKE: ICD-10-CM

## 2017-10-03 ENCOUNTER — OFFICE VISIT (OUTPATIENT)
Dept: FAMILY MEDICINE CLINIC | Facility: CLINIC | Age: 72
End: 2017-10-03

## 2017-10-03 VITALS
WEIGHT: 263 LBS | HEIGHT: 71 IN | OXYGEN SATURATION: 98 % | RESPIRATION RATE: 16 BRPM | BODY MASS INDEX: 36.82 KG/M2 | SYSTOLIC BLOOD PRESSURE: 138 MMHG | HEART RATE: 74 BPM | TEMPERATURE: 99 F | DIASTOLIC BLOOD PRESSURE: 74 MMHG

## 2017-10-03 DIAGNOSIS — T14.8XXA WOUND OF SKIN: ICD-10-CM

## 2017-10-03 DIAGNOSIS — E11.59 TYPE 2 DIABETES MELLITUS WITH OTHER CIRCULATORY COMPLICATION, WITHOUT LONG-TERM CURRENT USE OF INSULIN (HCC): Primary | ICD-10-CM

## 2017-10-03 PROCEDURE — 99214 OFFICE O/P EST MOD 30 MIN: CPT | Performed by: FAMILY MEDICINE

## 2017-10-03 NOTE — PROGRESS NOTES
Subjective   Jesus Izaguirre Jr. is a 72 y.o. male.     Chief Complaint   Patient presents with   • Follow-up     Patient is following up on lab resutls , high diabetes level.        HPI     Patient presents for follow-up on diabetes.  At her last office visit a week ago patient was found to have a hemoglobin A1c of 11.9.  According to records and reviewed his previous hemoglobin A1c's Young around 7.3.  He admits that his diet and exercise have not been appropriate.  He is also developed some ulcerations in his bilateral lower extremities that he states have not been healing quite as well as before.  He denies any fever or chills.    The following portions of the patient's history were reviewed and updated as appropriate: allergies, current medications, past family history, past medical history, past social history, past surgical history and problem list.    Review of Systems   Constitutional: Negative for activity change.   All other systems reviewed and are negative.      Objective  Vitals:    10/03/17 1313   BP: 138/74   Pulse: 74   Resp: 16   Temp: 99 °F (37.2 °C)   SpO2: 98%        Physical Exam   Constitutional: He is oriented to person, place, and time. He appears well-developed and well-nourished. No distress.   HENT:   Head: Normocephalic and atraumatic.   Right Ear: External ear normal.   Left Ear: External ear normal.   Nose: Nose normal.   Mouth/Throat: Oropharynx is clear and moist.   Eyes: Conjunctivae and EOM are normal. Pupils are equal, round, and reactive to light. Right eye exhibits no discharge. Left eye exhibits no discharge. No scleral icterus.   Cardiovascular: Normal rate, regular rhythm and normal heart sounds.    Pulmonary/Chest: Effort normal and breath sounds normal. No respiratory distress. He has no wheezes. He has no rales.   Abdominal: Soft. Bowel sounds are normal. He exhibits no distension. There is no tenderness.   Neurological: He is alert and oriented to person, place, and  time.   Skin: Skin is warm and dry. Lesion noted. He is not diaphoretic.        Nursing note and vitals reviewed.        Current Outpatient Prescriptions:   •  aspirin 325 MG tablet, Take 1 tablet by mouth Daily., Disp: 90 tablet, Rfl: 3  •  atorvastatin (LIPITOR) 80 MG tablet, TAKE ONE TABLET BY MOUTH EVERY NIGHT AT BEDTIME, Disp: 90 tablet, Rfl: 1  •  furosemide (LASIX) 40 MG tablet, TAKE ONE TABLET BY MOUTH DAILY, Disp: 90 tablet, Rfl: 1  •  glucose blood test strip, Amber Contour Next Test In Vitro Strip; Patient Sig: Amber Contour Next Test In Vitro Strip TEST ONCE DAILY; 50; 3; 04-Mar-2015; Active, Disp: , Rfl:   •  metFORMIN (GLUCOPHAGE) 500 MG tablet, TAKE ONE TABLET BY MOUTH TWICE A DAY WITH MEALS, Disp: 180 tablet, Rfl: 1  •  propafenone (RYTHMOL) 150 MG tablet, Take 1 tablet by mouth 2 (Two) Times a Day., Disp: 60 tablet, Rfl: 6  •  vitamin B-12 (CYANOCOBALAMIN) 1000 MCG tablet, Take 1,000 mcg by mouth daily., Disp: , Rfl:   •  vitamin D (CHOLECALCIFEROL) 400 UNITS tablet, Take 400 Units by mouth daily., Disp: , Rfl:   •  SITagliptin-MetFORMIN HCl ER (JANUMET XR)  MG tablet sustained-release 24 hour, Take 2 tablets by mouth Daily., Disp: 60 tablet, Rfl: 0    Procedures    Lab Results (most recent)     None          Assessment/Plan   Jesus was seen today for follow-up.    Diagnoses and all orders for this visit:    Type 2 diabetes mellitus with other circulatory complication, without long-term current use of insulin  -     SITagliptin-MetFORMIN HCl ER (JANUMET XR)  MG tablet sustained-release 24 hour; Take 2 tablets by mouth Daily.    Wound of skin      Extensive conversation had with the patient regarding the need to improve diet and exercise.  Will start Janumet ex RR as above.  We will give the patient one month to make improvements with diet and exercise and recheck of skin wound.  Will consider referral to wound care.    Return in about 4 weeks (around 10/31/2017) for Recheck.      Shailesh  MD [place]

## 2017-10-04 ENCOUNTER — TELEPHONE (OUTPATIENT)
Dept: NEUROLOGY | Facility: CLINIC | Age: 72
End: 2017-10-04

## 2017-10-04 DIAGNOSIS — I66.01 STENOSIS OF RIGHT MIDDLE CEREBRAL ARTERY: ICD-10-CM

## 2017-10-04 DIAGNOSIS — I65.23 BILATERAL CAROTID ARTERY STENOSIS: Primary | ICD-10-CM

## 2017-10-04 NOTE — TELEPHONE ENCOUNTER
----- Message from DULCE Beth sent at 10/4/2017 11:13 AM EDT -----  Will you let him know A-C looked at his scans and recommends a CTA to look at his carotids and right MCA, make sure he does not have worsening stenosis/plaque

## 2017-10-04 NOTE — TELEPHONE ENCOUNTER
I s/w patient and he agreed to have the CTA Head/Neck completed. I scheduled that on 10/10/17 at 3:45pm arriving at 3:30pm.

## 2017-10-10 ENCOUNTER — HOSPITAL ENCOUNTER (OUTPATIENT)
Dept: CT IMAGING | Facility: HOSPITAL | Age: 72
Discharge: HOME | End: 2017-10-10
Admitting: NURSE PRACTITIONER

## 2017-10-10 DIAGNOSIS — I65.23 BILATERAL CAROTID ARTERY STENOSIS: ICD-10-CM

## 2017-10-10 DIAGNOSIS — I66.01 STENOSIS OF RIGHT MIDDLE CEREBRAL ARTERY: ICD-10-CM

## 2017-10-10 LAB — CREAT BLDA-MCNC: 1.1 MG/DL (ref 0.6–1.3)

## 2017-10-10 PROCEDURE — 82565 ASSAY OF CREATININE: CPT

## 2017-10-10 PROCEDURE — 70496 CT ANGIOGRAPHY HEAD: CPT

## 2017-10-10 PROCEDURE — 70498 CT ANGIOGRAPHY NECK: CPT

## 2017-10-10 PROCEDURE — 0 IOPAMIDOL 61 % SOLUTION: Performed by: NURSE PRACTITIONER

## 2017-10-10 RX ADMIN — IOPAMIDOL 95 ML: 612 INJECTION, SOLUTION INTRAVENOUS at 15:45

## 2017-10-16 ENCOUNTER — TELEPHONE (OUTPATIENT)
Dept: NEUROLOGY | Facility: CLINIC | Age: 72
End: 2017-10-16

## 2017-10-16 NOTE — TELEPHONE ENCOUNTER
I called and S/W Mrs. Izaguirre.  Her  was at work.  She told me he is very noncompliant with his meds expecially his blood sugar meds.  His BS numbers are out of control  He saw his PCP and was started on Januvia.  He does continue to take his  mg daily.  I let her know the carotid artery is stable.  He does not need repeat imaging unless he has symptoms.  She told me he has not had any symptoms so far but she is worried he wont take his meds like he should.  His diet suffers he just eats what he wants.  He was counseled on this by his PCP.  MATTHEW De La Rosa RN

## 2017-10-16 NOTE — PROGRESS NOTES
Per Dr. Edgar  CTA: Distal left CCA stenosis, which is stable; RM1 occlusion, stable; intracranial LICA ulcerated stenosis    Continue ASA for stroke prevention along with anticoagulation for his atrial fib

## 2017-10-16 NOTE — TELEPHONE ENCOUNTER
----- Message from DULCE Beth sent at 10/16/2017  8:47 AM EDT -----  Please let him know his left carotid stenosis is stable. Continue asa with, is he on coumadin now?   He does not need any repeat imaging unless he has symptoms.

## 2017-10-23 ENCOUNTER — OFFICE VISIT (OUTPATIENT)
Dept: FAMILY MEDICINE CLINIC | Facility: CLINIC | Age: 72
End: 2017-10-23

## 2017-10-23 VITALS
BODY MASS INDEX: 36.4 KG/M2 | HEART RATE: 74 BPM | WEIGHT: 260 LBS | DIASTOLIC BLOOD PRESSURE: 70 MMHG | RESPIRATION RATE: 14 BRPM | HEIGHT: 71 IN | SYSTOLIC BLOOD PRESSURE: 132 MMHG | OXYGEN SATURATION: 98 % | TEMPERATURE: 98 F

## 2017-10-23 DIAGNOSIS — E11.42 TYPE 2 DIABETES MELLITUS WITH DIABETIC POLYNEUROPATHY, WITHOUT LONG-TERM CURRENT USE OF INSULIN (HCC): Primary | ICD-10-CM

## 2017-10-23 PROCEDURE — 99213 OFFICE O/P EST LOW 20 MIN: CPT | Performed by: FAMILY MEDICINE

## 2017-10-23 NOTE — PROGRESS NOTES
Subjective   Jesus Izaguirre Jr. is a 72 y.o. male.     Chief Complaint   Patient presents with   • Follow-up     Patient is following up on diabetes.        HPI     Patient is here to follow-up on diabetes and polyneuropathy.  Patient has been taking and tolerating the Janumet ex /2000 daily.  He actually states that his polyneuropathy in his bilateral lower extremities has improved greatly since starting the medication.  He reports no adverse side effects from the medication.  He is also made drastic improvements to his diet and exercise regimen.  He is avoiding sugar as well as limiting his carbohydrates.  He's lost 9 pounds.    The following portions of the patient's history were reviewed and updated as appropriate: allergies, current medications, past family history, past medical history, past social history, past surgical history and problem list.    Review of Systems   Constitutional: Negative for activity change.   All other systems reviewed and are negative.      Objective  Vitals:    10/23/17 1000   BP: 132/70   Pulse: 74   Resp: 14   Temp: 98 °F (36.7 °C)   SpO2: 98%        Physical Exam   Constitutional: He is oriented to person, place, and time. He appears well-developed and well-nourished. No distress.   HENT:   Head: Normocephalic and atraumatic.   Right Ear: External ear normal.   Left Ear: External ear normal.   Nose: Nose normal.   Mouth/Throat: Oropharynx is clear and moist.   Eyes: Conjunctivae and EOM are normal. Pupils are equal, round, and reactive to light. Right eye exhibits no discharge. Left eye exhibits no discharge. No scleral icterus.   Cardiovascular: Normal rate, regular rhythm and normal heart sounds.    Pulmonary/Chest: Effort normal and breath sounds normal. No respiratory distress. He has no wheezes. He has no rales.   Abdominal: Soft. Bowel sounds are normal. He exhibits no distension. There is no tenderness.   Neurological: He is alert and oriented to person, place, and  time.   Skin: Skin is warm and dry. He is not diaphoretic.   Nursing note and vitals reviewed.        Current Outpatient Prescriptions:   •  aspirin 325 MG tablet, Take 1 tablet by mouth Daily., Disp: 90 tablet, Rfl: 3  •  atorvastatin (LIPITOR) 80 MG tablet, TAKE ONE TABLET BY MOUTH EVERY NIGHT AT BEDTIME, Disp: 90 tablet, Rfl: 1  •  furosemide (LASIX) 40 MG tablet, TAKE ONE TABLET BY MOUTH DAILY, Disp: 90 tablet, Rfl: 1  •  glucose blood test strip, Amber Contour Next Test In Vitro Strip; Patient Sig: Amber Contour Next Test In Vitro Strip TEST ONCE DAILY; 50; 3; 04-Mar-2015; Active, Disp: , Rfl:   •  metFORMIN (GLUCOPHAGE) 500 MG tablet, TAKE ONE TABLET BY MOUTH TWICE A DAY WITH MEALS, Disp: 180 tablet, Rfl: 1  •  propafenone (RYTHMOL) 150 MG tablet, Take 1 tablet by mouth 2 (Two) Times a Day., Disp: 60 tablet, Rfl: 6  •  SITagliptin-MetFORMIN HCl ER (JANUMET XR)  MG tablet sustained-release 24 hour, Take 2 tablets by mouth Daily., Disp: 60 tablet, Rfl: 0  •  vitamin B-12 (CYANOCOBALAMIN) 1000 MCG tablet, Take 1,000 mcg by mouth daily., Disp: , Rfl:   •  vitamin D (CHOLECALCIFEROL) 400 UNITS tablet, Take 400 Units by mouth daily., Disp: , Rfl:     Procedures    Lab Results (most recent)     None          Assessment/Plan   Jesus was seen today for follow-up.    Diagnoses and all orders for this visit:    Type 2 diabetes mellitus with diabetic polyneuropathy, without long-term current use of insulin      Will continue current therapy.  Samples for 6 weeks supply of Janumet extended release 50/1000 given to the patient.  We will recheck A1c in 6 weeks.  Encouraged patient to continue with progress.    Return in about 6 weeks (around 12/4/2017) for Recheck.      Shailesh Souza MD

## 2017-11-02 ENCOUNTER — TELEPHONE (OUTPATIENT)
Dept: FAMILY MEDICINE CLINIC | Facility: CLINIC | Age: 72
End: 2017-11-02

## 2017-11-02 RX ORDER — AMLODIPINE BESYLATE AND BENAZEPRIL HYDROCHLORIDE 10; 40 MG/1; MG/1
1 CAPSULE ORAL DAILY
Qty: 90 CAPSULE | Refills: 3 | Status: SHIPPED | OUTPATIENT
Start: 2017-11-02 | End: 2018-11-02

## 2017-12-04 ENCOUNTER — OFFICE VISIT (OUTPATIENT)
Dept: FAMILY MEDICINE CLINIC | Facility: CLINIC | Age: 72
End: 2017-12-04

## 2017-12-04 VITALS
HEIGHT: 71 IN | WEIGHT: 261 LBS | DIASTOLIC BLOOD PRESSURE: 74 MMHG | SYSTOLIC BLOOD PRESSURE: 128 MMHG | HEART RATE: 66 BPM | TEMPERATURE: 98.6 F | OXYGEN SATURATION: 97 % | BODY MASS INDEX: 36.54 KG/M2 | RESPIRATION RATE: 14 BRPM

## 2017-12-04 DIAGNOSIS — E11.59 TYPE 2 DIABETES MELLITUS WITH OTHER CIRCULATORY COMPLICATION, WITHOUT LONG-TERM CURRENT USE OF INSULIN (HCC): ICD-10-CM

## 2017-12-04 DIAGNOSIS — E78.5 HYPERLIPIDEMIA, UNSPECIFIED HYPERLIPIDEMIA TYPE: ICD-10-CM

## 2017-12-04 DIAGNOSIS — E11.42 TYPE 2 DIABETES MELLITUS WITH DIABETIC POLYNEUROPATHY, WITHOUT LONG-TERM CURRENT USE OF INSULIN (HCC): Primary | ICD-10-CM

## 2017-12-04 DIAGNOSIS — I10 ESSENTIAL HYPERTENSION: ICD-10-CM

## 2017-12-04 LAB — HBA1C MFR BLD: 7.9 %

## 2017-12-04 PROCEDURE — 83036 HEMOGLOBIN GLYCOSYLATED A1C: CPT | Performed by: FAMILY MEDICINE

## 2017-12-04 PROCEDURE — 99214 OFFICE O/P EST MOD 30 MIN: CPT | Performed by: FAMILY MEDICINE

## 2017-12-04 NOTE — PROGRESS NOTES
Subjective   Jesus Izaguirre Jr. is a 72 y.o. male.     Chief Complaint   Patient presents with   • Follow-up     Patient has a follow up on diabetes.        HPI     Patient here to follow-up on hypertension, hyperlipidemia, and diabetes.  He is currently taking and tolerating the Janumet extended release.  He is tolerating this well and is improved his diet.  He's also noted some weight loss.  He's also taking amlodipine/benazepril with good results.  Blood pressure 128/74 with a pulse of 66.  Well-controlled.  He continues to use Lasix on a when necessary basis for swelling.  Also he taking atorvastatin 80 mg daily with good results with no adverse side effects.    The following portions of the patient's history were reviewed and updated as appropriate: allergies, current medications, past family history, past medical history, past social history, past surgical history and problem list.    Review of Systems   Constitutional: Negative for activity change.   All other systems reviewed and are negative.      Objective  Vitals:    12/04/17 1108   BP: 128/74   Pulse: 66   Resp: 14   Temp: 98.6 °F (37 °C)   SpO2: 97%        Physical Exam   Constitutional: He is oriented to person, place, and time. He appears well-developed and well-nourished. No distress.   HENT:   Head: Normocephalic and atraumatic.   Right Ear: External ear normal.   Left Ear: External ear normal.   Nose: Nose normal.   Mouth/Throat: Oropharynx is clear and moist.   Eyes: Conjunctivae and EOM are normal. Pupils are equal, round, and reactive to light. Right eye exhibits no discharge. Left eye exhibits no discharge. No scleral icterus.   Cardiovascular: Normal rate, regular rhythm and normal heart sounds.    Pulmonary/Chest: Effort normal and breath sounds normal. No respiratory distress. He has no wheezes. He has no rales.   Abdominal: Soft. Bowel sounds are normal. He exhibits no distension. There is no tenderness.   Neurological: He is alert and  oriented to person, place, and time.   Skin: Skin is warm and dry. He is not diaphoretic.   Nursing note and vitals reviewed.        Current Outpatient Prescriptions:   •  amLODIPine-benazepril (LOTREL) 10-40 MG per capsule, Take 1 capsule by mouth Daily., Disp: 90 capsule, Rfl: 3  •  aspirin 325 MG tablet, Take 1 tablet by mouth Daily., Disp: 90 tablet, Rfl: 3  •  atorvastatin (LIPITOR) 80 MG tablet, TAKE ONE TABLET BY MOUTH EVERY NIGHT AT BEDTIME, Disp: 90 tablet, Rfl: 1  •  furosemide (LASIX) 40 MG tablet, TAKE ONE TABLET BY MOUTH DAILY, Disp: 90 tablet, Rfl: 1  •  glucose blood test strip, Amber Contour Next Test In Vitro Strip; Patient Sig: Amber Contour Next Test In Vitro Strip TEST ONCE DAILY; 50; 3; 04-Mar-2015; Active, Disp: , Rfl:   •  SITagliptin-MetFORMIN HCl ER (JANUMET XR)  MG tablet sustained-release 24 hour, Take 2 tablets by mouth Daily., Disp: 60 tablet, Rfl: 11  •  vitamin B-12 (CYANOCOBALAMIN) 1000 MCG tablet, Take 1,000 mcg by mouth daily., Disp: , Rfl:   •  vitamin D (CHOLECALCIFEROL) 400 UNITS tablet, Take 400 Units by mouth daily., Disp: , Rfl:   •  metFORMIN (GLUCOPHAGE) 500 MG tablet, TAKE ONE TABLET BY MOUTH TWICE A DAY WITH MEALS, Disp: 180 tablet, Rfl: 1  •  propafenone (RYTHMOL) 150 MG tablet, Take 1 tablet by mouth 2 (Two) Times a Day., Disp: 60 tablet, Rfl: 6    Procedures    Lab Results (most recent)     None          Assessment/Plan   Jesus was seen today for follow-up.    Diagnoses and all orders for this visit:    Type 2 diabetes mellitus with diabetic polyneuropathy, without long-term current use of insulin    Type 2 diabetes mellitus with other circulatory complication, without long-term current use of insulin  -     SITagliptin-MetFORMIN HCl ER (JANUMET XR)  MG tablet sustained-release 24 hour; Take 2 tablets by mouth Daily.  -     POC Glycosylated Hemoglobin (Hb A1C)    Essential hypertension    Hyperlipidemia, unspecified hyperlipidemia type    Hemoglobin A1c  today 7.9.  This is down from 11.6 or months ago.  I'm very proud of his progress.  Advised him to continue current therapy and continue to improve diet and exercise.  We'll follow-up in 3 months.  Continue other current medications as prescribed.    Return in about 3 months (around 3/4/2018) for Recheck.      Shailesh Souza MD

## 2017-12-28 ENCOUNTER — OFFICE VISIT (OUTPATIENT)
Dept: FAMILY MEDICINE CLINIC | Facility: CLINIC | Age: 72
End: 2017-12-28

## 2017-12-28 VITALS
BODY MASS INDEX: 38.16 KG/M2 | OXYGEN SATURATION: 99 % | DIASTOLIC BLOOD PRESSURE: 68 MMHG | TEMPERATURE: 97.6 F | WEIGHT: 272.6 LBS | HEART RATE: 96 BPM | HEIGHT: 71 IN | SYSTOLIC BLOOD PRESSURE: 134 MMHG

## 2017-12-28 DIAGNOSIS — M25.561 ACUTE PAIN OF RIGHT KNEE: Primary | ICD-10-CM

## 2017-12-28 PROCEDURE — 99213 OFFICE O/P EST LOW 20 MIN: CPT | Performed by: NURSE PRACTITIONER

## 2017-12-28 NOTE — PROGRESS NOTES
"Subjective   Jesus Izaguirre Jr. is a 72 y.o. male.     History of Present Illness   Knee Pain: Patient complains of right knee pain. This is evaluated as a personal injury. The pain began 7 days ago. The pain is located anterior, patellar.  He describes the symptoms as aching and sharp. Symptoms improve with rest, ice. The symptoms are worse with activity, deep knee bending, weight bearing. The knee has given out or felt unstable. The patient cannot bend and straighten the knee fully.  The patient is active in none. Treatment to date has been ice, heat, Tylenol, NSAID's, with significant relief. Knee did immediately swell he did not fall.  Stepped out of truck and twisted right knee.  Has been using cane to walk and keeping it elevated and using heat and ice.  Swelling went away Monday and he was able to walk  Without cane on Sunday.  Pt feels that his knee is 75% better at this point.     The following portions of the patient's history were reviewed and updated as appropriate: allergies, current medications, past social history and problem list.    Review of Systems   Constitutional: Negative for fever.   All other systems reviewed and are negative.      Objective   /68 (BP Location: Right arm, Patient Position: Sitting)  Pulse 96  Temp 97.6 °F (36.4 °C)  Ht 180.3 cm (70.98\")  Wt 124 kg (272 lb 9.6 oz)  SpO2 99%  BMI 38.04 kg/m2  Physical Exam   Constitutional: He is oriented to person, place, and time. Vital signs are normal. He appears well-developed and well-nourished. No distress.   HENT:   Head: Normocephalic.   Cardiovascular: Normal rate, regular rhythm and normal heart sounds.    Pulmonary/Chest: Effort normal and breath sounds normal.   Neurological: He is alert and oriented to person, place, and time. Gait normal.   Psychiatric: He has a normal mood and affect. His behavior is normal. Judgment and thought content normal.   Vitals reviewed.      Assessment/Plan   Problem List Items Addressed " This Visit        Musculoskeletal and Integument    Acute pain of right knee - Primary           cont with ice and NSAID for 1-2 weeks and rto if needed

## 2018-03-05 ENCOUNTER — OFFICE VISIT (OUTPATIENT)
Dept: FAMILY MEDICINE CLINIC | Facility: CLINIC | Age: 73
End: 2018-03-05

## 2018-03-05 VITALS
SYSTOLIC BLOOD PRESSURE: 122 MMHG | OXYGEN SATURATION: 99 % | TEMPERATURE: 98 F | RESPIRATION RATE: 14 BRPM | WEIGHT: 265 LBS | HEIGHT: 70 IN | DIASTOLIC BLOOD PRESSURE: 70 MMHG | HEART RATE: 74 BPM | BODY MASS INDEX: 37.94 KG/M2

## 2018-03-05 DIAGNOSIS — E11.42 TYPE 2 DIABETES MELLITUS WITH DIABETIC POLYNEUROPATHY, WITHOUT LONG-TERM CURRENT USE OF INSULIN (HCC): Primary | ICD-10-CM

## 2018-03-05 LAB — HBA1C MFR BLD: 6.8 %

## 2018-03-05 PROCEDURE — 83036 HEMOGLOBIN GLYCOSYLATED A1C: CPT | Performed by: FAMILY MEDICINE

## 2018-03-05 PROCEDURE — 99213 OFFICE O/P EST LOW 20 MIN: CPT | Performed by: FAMILY MEDICINE

## 2018-03-05 NOTE — PROGRESS NOTES
Subjective   Jesus Izaguirre Jr. is a 72 y.o. male.     Chief Complaint   Patient presents with   • Follow-up     Patient is having 3 months follow u on diabetes.        HPI     Patient presents to the office today to follow-up on diabetes.  He has been taking and tolerating the Janumet extended-release tablets.  Has tried to improve his diet and exercise.  Has lost 5 pounds in the last 3 months.  No adverse side effects from the medication.  No signs or symptoms of hyper or hypoglycemia.    The following portions of the patient's history were reviewed and updated as appropriate: allergies, current medications, past family history, past medical history, past social history, past surgical history and problem list.    Review of Systems   Constitutional: Negative for activity change.   All other systems reviewed and are negative.      Objective  Vitals:    03/05/18 0859   BP: 122/70   Pulse: 74   Resp: 14   Temp: 98 °F (36.7 °C)   SpO2: 99%        Physical Exam   Constitutional: He is oriented to person, place, and time. He appears well-developed and well-nourished. No distress.   HENT:   Head: Normocephalic and atraumatic.   Right Ear: External ear normal.   Left Ear: External ear normal.   Nose: Nose normal.   Mouth/Throat: Oropharynx is clear and moist.   Eyes: Conjunctivae and EOM are normal. Pupils are equal, round, and reactive to light. Right eye exhibits no discharge. Left eye exhibits no discharge. No scleral icterus.   Cardiovascular: Normal rate, regular rhythm and normal heart sounds.    Pulmonary/Chest: Effort normal and breath sounds normal. No respiratory distress. He has no wheezes. He has no rales.   Abdominal: Soft. Bowel sounds are normal. He exhibits no distension. There is no tenderness.   Neurological: He is alert and oriented to person, place, and time.   Skin: Skin is warm and dry. He is not diaphoretic.   Nursing note and vitals reviewed.        Current Outpatient Prescriptions:   •   amLODIPine-benazepril (LOTREL) 10-40 MG per capsule, Take 1 capsule by mouth Daily., Disp: 90 capsule, Rfl: 3  •  apixaban (ELIQUIS) 5 MG tablet tablet, Take 5 mg by mouth., Disp: , Rfl:   •  aspirin 325 MG tablet, Take 1 tablet by mouth Daily., Disp: 90 tablet, Rfl: 3  •  atorvastatin (LIPITOR) 80 MG tablet, TAKE ONE TABLET BY MOUTH EVERY NIGHT AT BEDTIME, Disp: 90 tablet, Rfl: 1  •  furosemide (LASIX) 40 MG tablet, TAKE ONE TABLET BY MOUTH DAILY, Disp: 90 tablet, Rfl: 1  •  metFORMIN (GLUCOPHAGE) 500 MG tablet, TAKE ONE TABLET BY MOUTH TWICE A DAY WITH MEALS, Disp: 180 tablet, Rfl: 1  •  propafenone (RYTHMOL) 150 MG tablet, Take 1 tablet by mouth 2 (Two) Times a Day., Disp: 60 tablet, Rfl: 6  •  SITagliptin-MetFORMIN HCl ER (JANUMET XR)  MG tablet sustained-release 24 hour, Take 2 tablets by mouth Daily., Disp: 60 tablet, Rfl: 11  •  vitamin B-12 (CYANOCOBALAMIN) 1000 MCG tablet, Take 1,000 mcg by mouth daily., Disp: , Rfl:   •  vitamin D (CHOLECALCIFEROL) 400 UNITS tablet, Take 400 Units by mouth daily., Disp: , Rfl:   •  glucose blood test strip, Amber Contour Next Test In Vitro Strip; Patient Sig: Amber Contour Next Test In Vitro Strip TEST ONCE DAILY; 50; 3; 04-Mar-2015; Active, Disp: , Rfl:     Procedures    Lab Results (most recent)     None          Assessment/Plan   Jesus was seen today for follow-up.    Diagnoses and all orders for this visit:    Type 2 diabetes mellitus with diabetic polyneuropathy, without long-term current use of insulin  -     POC Glycosylated Hemoglobin (Hb A1C)    Hemoglobin A1c today 6.8.  This is down from 7.93 months ago.  This is fantastic.  Continue therapy as above.  Advised continued improvements in diet and exercise.  Advised patient to get an appointment with his eye doctor for a dilated eye exam.      Return in about 3 months (around 6/5/2018) for Recheck.      Shailesh Souza MD

## 2018-03-22 ENCOUNTER — OFFICE VISIT (OUTPATIENT)
Dept: NEUROLOGY | Facility: CLINIC | Age: 73
End: 2018-03-22

## 2018-03-22 VITALS
SYSTOLIC BLOOD PRESSURE: 148 MMHG | BODY MASS INDEX: 37.65 KG/M2 | WEIGHT: 263 LBS | OXYGEN SATURATION: 96 % | HEIGHT: 70 IN | HEART RATE: 64 BPM | DIASTOLIC BLOOD PRESSURE: 78 MMHG

## 2018-03-22 DIAGNOSIS — I69.30 CHRONIC ISCHEMIC RIGHT MCA STROKE: ICD-10-CM

## 2018-03-22 DIAGNOSIS — I10 ESSENTIAL HYPERTENSION: ICD-10-CM

## 2018-03-22 DIAGNOSIS — I48.91 ATRIAL FIBRILLATION, UNSPECIFIED TYPE (HCC): ICD-10-CM

## 2018-03-22 DIAGNOSIS — E11.42 TYPE 2 DIABETES MELLITUS WITH DIABETIC POLYNEUROPATHY, WITHOUT LONG-TERM CURRENT USE OF INSULIN (HCC): ICD-10-CM

## 2018-03-22 DIAGNOSIS — Z72.0 TOBACCO ABUSE: ICD-10-CM

## 2018-03-22 DIAGNOSIS — E78.5 HYPERLIPIDEMIA, UNSPECIFIED HYPERLIPIDEMIA TYPE: ICD-10-CM

## 2018-03-22 DIAGNOSIS — I66.01 OCCLUSION AND STENOSIS OF RIGHT MIDDLE CEREBRAL ARTERY: ICD-10-CM

## 2018-03-22 DIAGNOSIS — I77.9 CAROTID DISEASE, BILATERAL (HCC): Primary | ICD-10-CM

## 2018-03-22 PROCEDURE — 99213 OFFICE O/P EST LOW 20 MIN: CPT | Performed by: NURSE PRACTITIONER

## 2018-03-22 NOTE — PROGRESS NOTES
DOS: 3/24/2018  NAME: Jesus Izaguirre Jr.   : 1945  PCP: Shailesh Souza MD    Chief Complaint   Patient presents with   • chronic ischemic right MCA stroke        Neurological Problem and Interval History:  72 y.o. RHW male with HTN, HLD, DM, YEISON, atrial fib, tobacco abuse and stroke. He presents today for his annual stroke FU.      He denies any recurrent S/S of stroke and no H/As or falls. He is on Eliquis along with ASA and tolerating. His BP is high for him today it is typically in the 130s-140. He does not notice going into afib. His A1C was 11 and now around 6. He is tolerating is Lipitor. He still smokes about a 1/2 PPD. He has lost about 5 pounds but he has done anything different. He feels like his memory is a bit better. He is still working, driving a dump truck.     2014 he woke up and noted he didn't feel right. He had difficulty walking, off balance. Then he tried talking to his wife but his words were coming out slurred. He came to the hospital, Dx with stroke and after a few days he felt better but he did have out patient ST and PT. He was on a baby ASA at the time of the event. He was D/C on full dose ASA and Lipitor. After his stroke his ST memory got worse, he was followed by Dr. Azar originally. He is otherwise back to his baseline able to manage his own finances and take care of himself. He had a LINQ placed which revealed afib and has been on Eliquis.     Review of Systems:        Review of Systems   Constitutional: Positive for fatigue. Negative for chills and fever.   HENT: Positive for drooling. Negative for hearing loss, tinnitus and trouble swallowing.    Eyes: Negative for pain, discharge, redness, itching and visual disturbance.   Respiratory: Positive for apnea, cough and shortness of breath. Negative for wheezing.    Cardiovascular: Negative for chest pain, palpitations and leg swelling.   Gastrointestinal: Negative for constipation, diarrhea, rectal pain and vomiting.  "  Endocrine: Negative for cold intolerance, heat intolerance and polydipsia.   Genitourinary: Negative for decreased urine volume, difficulty urinating, frequency and urgency.   Musculoskeletal: Negative for back pain, gait problem, neck pain and neck stiffness.   Skin: Negative for color change, rash and wound.   Allergic/Immunologic: Negative for environmental allergies, food allergies and immunocompromised state.   Neurological: Positive for numbness (back of left leg, ). Negative for dizziness, tremors, seizures, syncope, facial asymmetry, speech difficulty, weakness, light-headedness and headaches.   Hematological: Negative for adenopathy. Does not bruise/bleed easily.   Psychiatric/Behavioral: Positive for confusion (memory ). Negative for agitation, behavioral problems, decreased concentration, dysphoric mood, hallucinations, self-injury, sleep disturbance and suicidal ideas. The patient is not nervous/anxious and is not hyperactive.        \"The following portions of the patient's history were reviewed and updated as appropriate: allergies, current medications, past family history, past medical history, past social history, past surgical history and problem list.\"  Review and Interpretation of Imaging:  10/2017 Per Dr. Edgar  CTA: Distal left CCA stenosis, which is stable; RM1 occlusion, stable; intracranial LICA ulcerated stenosis    Laboratory Results:             Lab Results   Component Value Date    HGBA1C 6.8 03/05/2018       No results found for: CHOL      Lab Results   Component Value Date    HDL 49 09/21/2017    HDL 52 08/22/2016    HDL 48 08/10/2015         Lab Results   Component Value Date    LDL 47 09/21/2017    LDL 48 08/22/2016    LDL 43 08/10/2015         Lab Results   Component Value Date    TRIG 77 09/21/2017    TRIG 68 08/22/2016    TRIG 46 08/10/2015     No results found for: RPR  Lab Results   Component Value Date    TSH 1.190 05/01/2017     Lab Results   Component Value Date    " [id number] 05/01/2017       Physical Examination:  mRS: 0  General Appearance:   Well developed, obese, disheveled, alert, and cooperative.  HEENT: Normocephalic.    Neck and Spine: No bruits.  Cardiac: Regular rate and rhythm. No murmurs.  Extremities:    No edema or deformities. Chronic LE vascular changes   Neurological examination:  Higher Integrative  Function: Oriented to time, place and person. Normal registration, attention span and concentration. Normal language including comprehension, spontaneous speech, repetition, naming and vocabulary. No neglect with normal visual-spatial function and construction. Normal fund of knowledge and higher integrative function.    CN III IV VI: Extraocular movements are full without nystagmus.   CN V: Normal facial sensation and strength of muscles of mastication.  CN VII: Facial movements are symmetric. No weakness.  CN VIII:   Auditory acuity is normal.  CN IX & X:   Symmetric palatal movement.  CN XI: Sternocleidomastoid and trapezius are normal.  No weakness.  CN XII:   The tongue is midline.  No atrophy or fasciculations.  Motor: Normal muscle strength, bulk and tone in upper and lower extremities.  No fasciculations, rigidity, spasticity, or abnormal movements.  Sensation: Normal to light touch, temperature, and proprioception in arms and legs. Normal graphesthesia and no extinction on DSS.  Station and Gait: Normal gait and station.    Coordination: Rapid alternating movements are normal.     Diagnoses / Discussion:  Mr. Izaguirre is a 73yo, who presents for his annual stroke follow up. He is 4 years post RMCA infract with two possible etiology's. Either cardioembolic from atrial fib or artery to artery embolism from atherosclerosis. He is now on Eliquis for atrial fib. We repeated imaging of his vessels in 10/2017 which revealed stable plaque with out any severe flow limiting stenosis. At minimal he will teresa an annual carotid US, will do in 1 year. He needs to  remain on an antiplatelet with anticoagulation for stroke prevention considering his atherosclerosis. He also needs continued aggressive risk factor control. He needs to check his BP regularly, continue LIpitor 80mg, modify his diet, lose weight and quit smoking. He voices he knows he needs to quit, cut back, but does not have a plan to quit right now. Regarding lab work I recommend he follow up with Dr. Souza, B12, vitamin D, A1C and cholesterol etc. He agrees with the plan and will call with any questions or concerns.        Plan:   Continue Lipitor 80mg and ASA 325mg   Continue Eliquis per cardiology    Smoking cessation    Blood pressure control to <130/80   Goal LDL <70-recommend high dose statins-    Serum glucose < 140   Call 911 for stroke any stroke symptoms   Follow-up 1 year with carotid US   Jesus was seen today for chronic ischemic right mca stroke.    Diagnoses and all orders for this visit:    Chronic ischemic right MCA stroke    Essential hypertension    Hyperlipidemia, unspecified hyperlipidemia type    Type 2 diabetes mellitus with diabetic polyneuropathy, without long-term current use of insulin    Atrial fibrillation, unspecified type    Tobacco abuse        Coding

## 2018-03-24 PROBLEM — Z72.0 TOBACCO ABUSE: Status: ACTIVE | Noted: 2018-03-24

## 2018-03-24 PROBLEM — I48.91 ATRIAL FIBRILLATION (HCC): Status: ACTIVE | Noted: 2018-03-24

## 2018-05-25 ENCOUNTER — OFFICE VISIT (OUTPATIENT)
Dept: FAMILY MEDICINE CLINIC | Facility: CLINIC | Age: 73
End: 2018-05-25

## 2018-05-25 VITALS
DIASTOLIC BLOOD PRESSURE: 74 MMHG | WEIGHT: 271 LBS | BODY MASS INDEX: 38.8 KG/M2 | OXYGEN SATURATION: 98 % | HEIGHT: 70 IN | SYSTOLIC BLOOD PRESSURE: 138 MMHG | HEART RATE: 69 BPM | TEMPERATURE: 98.2 F | RESPIRATION RATE: 14 BRPM

## 2018-05-25 DIAGNOSIS — L72.3 SEBACEOUS CYST: Primary | ICD-10-CM

## 2018-05-25 PROCEDURE — 99214 OFFICE O/P EST MOD 30 MIN: CPT | Performed by: FAMILY MEDICINE

## 2018-05-25 RX ORDER — SULFAMETHOXAZOLE AND TRIMETHOPRIM 800; 160 MG/1; MG/1
1 TABLET ORAL 2 TIMES DAILY
Qty: 20 TABLET | Refills: 0 | Status: SHIPPED | OUTPATIENT
Start: 2018-05-25 | End: 2018-06-05

## 2018-05-25 NOTE — PROGRESS NOTES
Jesus Izaguirre Jr. is a 72 y.o. male.     Chief Complaint   Patient presents with   • Cyst     Patient has a cyst on his back noticed it yesterdy.        HPI     Patient presents today to discuss problem that is new to me.  Patient states that over the course last month or so patient is developed a painful swelling with erythema and drainage of his back.  He states that over the last 2 days it's gotten worse.  No fever or chills.  Very tender to touch according to the patient.  Does have a history of some other type large pimples on his body.    The following portions of the patient's history were reviewed and updated as appropriate: allergies, current medications, past family history, past medical history, past social history, past surgical history and problem list.    Review of Systems   Musculoskeletal: Negative for back pain.   All other systems reviewed and are negative.      Objective  Vitals:    05/25/18 0740   BP: 138/74   Pulse: 69   Resp: 14   Temp: 98.2 °F (36.8 °C)   SpO2: 98%       Physical Exam   Constitutional: He is oriented to person, place, and time. He appears well-developed and well-nourished. No distress.   Musculoskeletal:        Back:    On the left side of the patient's back there is a large, erythematous, tender cyst.  With light pressure there is some drainage from the skin.   Neurological: He is alert and oriented to person, place, and time.   Skin: He is not diaphoretic.   Psychiatric: He has a normal mood and affect. His behavior is normal.   Nursing note and vitals reviewed.        Current Outpatient Prescriptions:   •  amLODIPine-benazepril (LOTREL) 10-40 MG per capsule, Take 1 capsule by mouth Daily., Disp: 90 capsule, Rfl: 3  •  apixaban (ELIQUIS) 5 MG tablet tablet, Take 5 mg by mouth., Disp: , Rfl:   •  aspirin 325 MG tablet, Take 1 tablet by mouth Daily., Disp: 90 tablet, Rfl: 3  •  atorvastatin (LIPITOR) 80 MG tablet, TAKE ONE TABLET BY MOUTH EVERY NIGHT AT BEDTIME, Disp: 90  tablet, Rfl: 1  •  furosemide (LASIX) 40 MG tablet, TAKE ONE TABLET BY MOUTH DAILY, Disp: 90 tablet, Rfl: 1  •  glucose blood test strip, Amber Contour Next Test In Vitro Strip; Patient Sig: Amber Contour Next Test In Vitro Strip TEST ONCE DAILY; 50; 3; 04-Mar-2015; Active, Disp: , Rfl:   •  metFORMIN (GLUCOPHAGE) 500 MG tablet, TAKE ONE TABLET BY MOUTH TWICE A DAY WITH MEALS, Disp: 180 tablet, Rfl: 1  •  propafenone (RYTHMOL) 150 MG tablet, Take 1 tablet by mouth 2 (Two) Times a Day., Disp: 60 tablet, Rfl: 6  •  SITagliptin-MetFORMIN HCl ER (JANUMET XR)  MG tablet sustained-release 24 hour, Take 2 tablets by mouth Daily., Disp: 60 tablet, Rfl: 11  •  vitamin B-12 (CYANOCOBALAMIN) 1000 MCG tablet, Take 1,000 mcg by mouth daily., Disp: , Rfl:   •  vitamin D (CHOLECALCIFEROL) 400 UNITS tablet, Take 400 Units by mouth daily., Disp: , Rfl:   •  sulfamethoxazole-trimethoprim (BACTRIM DS,SEPTRA DS) 800-160 MG per tablet, Take 1 tablet by mouth 2 (Two) Times a Day for 10 days., Disp: 20 tablet, Rfl: 0    Procedures    Lab Results (most recent)     None              Jesus was seen today for cyst.    Diagnoses and all orders for this visit:    Sebaceous cyst  -     sulfamethoxazole-trimethoprim (BACTRIM DS,SEPTRA DS) 800-160 MG per tablet; Take 1 tablet by mouth 2 (Two) Times a Day for 10 days.  -     Ambulatory Referral to Dermatology      Was likely a sebaceous cyst.  It does look to be superimposed with infection.  We'll treat as above with Bactrim.  Some debridement and drainage of the cyst was done in the office.  The head of the cyst was opened with an 18-gauge needle slightly and about 1 cc of thick malodorous fluid was expressed.  Pressure bandage applied place.  We'll refer to dermatology as this is most likely going to need to be extracted with the capsule.  Discussed concerning signs and symptoms.  Patient will return in 2 weeks for a Medicare wellness visit.    Return in about 2 weeks (around 6/8/2018) for  La.      Shailesh Souza MD

## 2018-06-05 ENCOUNTER — OFFICE VISIT (OUTPATIENT)
Dept: FAMILY MEDICINE CLINIC | Facility: CLINIC | Age: 73
End: 2018-06-05

## 2018-06-05 VITALS
OXYGEN SATURATION: 96 % | BODY MASS INDEX: 39.08 KG/M2 | HEIGHT: 70 IN | RESPIRATION RATE: 14 BRPM | SYSTOLIC BLOOD PRESSURE: 130 MMHG | TEMPERATURE: 98.2 F | WEIGHT: 273 LBS | DIASTOLIC BLOOD PRESSURE: 74 MMHG | HEART RATE: 75 BPM

## 2018-06-05 DIAGNOSIS — E11.42 TYPE 2 DIABETES MELLITUS WITH DIABETIC POLYNEUROPATHY, WITHOUT LONG-TERM CURRENT USE OF INSULIN (HCC): Primary | ICD-10-CM

## 2018-06-05 DIAGNOSIS — I10 ESSENTIAL HYPERTENSION: ICD-10-CM

## 2018-06-05 DIAGNOSIS — E78.5 HYPERLIPIDEMIA, UNSPECIFIED HYPERLIPIDEMIA TYPE: ICD-10-CM

## 2018-06-05 DIAGNOSIS — IMO0001 CLASS 2 OBESITY DUE TO EXCESS CALORIES WITH SERIOUS COMORBIDITY AND BODY MASS INDEX (BMI) OF 39.0 TO 39.9 IN ADULT: ICD-10-CM

## 2018-06-05 LAB — HBA1C MFR BLD: 7.7 %

## 2018-06-05 PROCEDURE — 99214 OFFICE O/P EST MOD 30 MIN: CPT | Performed by: FAMILY MEDICINE

## 2018-06-05 PROCEDURE — 83036 HEMOGLOBIN GLYCOSYLATED A1C: CPT | Performed by: FAMILY MEDICINE

## 2018-06-05 NOTE — PROGRESS NOTES
Jesus Izaguirre Jr. is a 72 y.o. male.     Chief Complaint   Patient presents with   • Diabetes     Patient has 3 months follow up on diabetes.        HPI     Patient presents today to follow-up on diabetes, hypertension, hyperlipidemia and obesity.  Patient is concerned because he continues to gain weight.  He would like to try low-carb diet.  He needs his blood sugar checked today.  His been taking and tolerating Janumet extended release 50/1000 mg tablets daily.  Admits to not being adherent to diet eating butteringers at night.  Patient's blood pressure is currently stable and well controlled with the use of amlodipine/benazepril, Lasix.  Patient also takes atorvastatin for hyperlipidemia.  He would like to come off some of these medications and feels like losing weight would accomplish that.      The following portions of the patient's history were reviewed and updated as appropriate: allergies, current medications, past family history, past medical history, past social history, past surgical history and problem list.    Review of Systems   Constitutional: Negative for activity change.   All other systems reviewed and are negative.      Objective  Vitals:    06/05/18 0918   BP: 130/74   Pulse: 75   Resp: 14   Temp: 98.2 °F (36.8 °C)   SpO2: 96%       Physical Exam   Constitutional: He is oriented to person, place, and time. He appears well-developed and well-nourished. No distress.   HENT:   Head: Normocephalic and atraumatic.   Right Ear: External ear normal.   Left Ear: External ear normal.   Nose: Nose normal.   Mouth/Throat: Oropharynx is clear and moist.   Eyes: Conjunctivae and EOM are normal. Pupils are equal, round, and reactive to light. Right eye exhibits no discharge. Left eye exhibits no discharge. No scleral icterus.   Cardiovascular: Normal rate, regular rhythm and normal heart sounds.    Pulmonary/Chest: Effort normal and breath sounds normal. No respiratory distress. He has no wheezes. He has  no rales.   Abdominal: Soft. Bowel sounds are normal. He exhibits no distension. There is no tenderness.   Neurological: He is alert and oriented to person, place, and time.   Skin: Skin is warm and dry. He is not diaphoretic.   Nursing note and vitals reviewed.        Current Outpatient Prescriptions:   •  amLODIPine-benazepril (LOTREL) 10-40 MG per capsule, Take 1 capsule by mouth Daily., Disp: 90 capsule, Rfl: 3  •  apixaban (ELIQUIS) 5 MG tablet tablet, Take 5 mg by mouth., Disp: , Rfl:   •  aspirin 325 MG tablet, Take 1 tablet by mouth Daily., Disp: 90 tablet, Rfl: 3  •  atorvastatin (LIPITOR) 80 MG tablet, TAKE ONE TABLET BY MOUTH EVERY NIGHT AT BEDTIME, Disp: 90 tablet, Rfl: 1  •  furosemide (LASIX) 40 MG tablet, TAKE ONE TABLET BY MOUTH DAILY, Disp: 90 tablet, Rfl: 1  •  glucose blood test strip, Amber Contour Next Test In Vitro Strip; Patient Sig: Amber Contour Next Test In Vitro Strip TEST ONCE DAILY; 50; 3; 04-Mar-2015; Active, Disp: , Rfl:   •  metFORMIN (GLUCOPHAGE) 500 MG tablet, TAKE ONE TABLET BY MOUTH TWICE A DAY WITH MEALS, Disp: 180 tablet, Rfl: 1  •  propafenone (RYTHMOL) 150 MG tablet, Take 1 tablet by mouth 2 (Two) Times a Day., Disp: 60 tablet, Rfl: 6  •  SITagliptin-MetFORMIN HCl ER (JANUMET XR)  MG tablet sustained-release 24 hour, Take 2 tablets by mouth Daily., Disp: 60 tablet, Rfl: 11  •  vitamin B-12 (CYANOCOBALAMIN) 1000 MCG tablet, Take 1,000 mcg by mouth daily., Disp: , Rfl:   •  vitamin D (CHOLECALCIFEROL) 400 UNITS tablet, Take 400 Units by mouth daily., Disp: , Rfl:     Procedures    Lab Results (most recent)     None              Jesus was seen today for diabetes.    Diagnoses and all orders for this visit:    Type 2 diabetes mellitus with diabetic polyneuropathy, without long-term current use of insulin  -     POC Glycosylated Hemoglobin (Hb A1C)    Essential hypertension    Hyperlipidemia, unspecified hyperlipidemia type    Class 2 obesity due to excess calories with  serious comorbidity and body mass index (BMI) of 39.0 to 39.9 in adult    Hemoglobin A1c today 7.7.  This is concerning.  Patient is motivated at this time to make changes to diet and exercise.  Discussed the need to limit carbohydrates.  Patient like to try low-carb diet.  Patient would like 3 months before making changes to his other medications.      Return in about 3 months (around 9/5/2018) for Recheck.      Shailesh Souza MD

## 2018-07-23 ENCOUNTER — TELEPHONE (OUTPATIENT)
Dept: NEUROLOGY | Facility: CLINIC | Age: 73
End: 2018-07-23

## 2018-07-23 DIAGNOSIS — E78.5 HYPERLIPIDEMIA LDL GOAL <70: Primary | ICD-10-CM

## 2018-07-23 NOTE — TELEPHONE ENCOUNTER
Left a message letting pt know a lipid panel order has been placed and Zahraa would like that drawn prior to refilling Lipitor. Last lipid panel on file is 09/17.

## 2018-07-23 NOTE — TELEPHONE ENCOUNTER
----- Message from Rakesh Frias sent at 7/20/2018  2:06 PM EDT -----  Contact: [phone]  PT CALLING TO GET REFILL ON LIPITOR. PLEASE ADVISE AND CALL PT [phone]

## 2018-07-24 ENCOUNTER — LAB (OUTPATIENT)
Dept: LAB | Facility: HOSPITAL | Age: 73
End: 2018-07-24

## 2018-07-24 DIAGNOSIS — E78.5 HYPERLIPIDEMIA LDL GOAL <70: ICD-10-CM

## 2018-07-24 LAB
CHOLEST SERPL-MCNC: 154 MG/DL (ref 0–200)
HDLC SERPL-MCNC: 64 MG/DL (ref 40–60)
LDLC SERPL CALC-MCNC: 67 MG/DL (ref 0–100)
LDLC/HDLC SERPL: 1.05 {RATIO}
TRIGL SERPL-MCNC: 115 MG/DL (ref 0–150)
VLDLC SERPL-MCNC: 23 MG/DL (ref 5–40)

## 2018-07-24 PROCEDURE — 36415 COLL VENOUS BLD VENIPUNCTURE: CPT

## 2018-07-24 PROCEDURE — 80061 LIPID PANEL: CPT

## 2018-07-24 NOTE — PROGRESS NOTES
Let him know his LDL is 67, goal is 40-70. If this is on lipitor 80mg I recommend he cont the liptior 80mg. If he is on 40mg then continue the current dose. FU with PCP in 3-6 months for repeat labs

## 2018-07-25 ENCOUNTER — TELEPHONE (OUTPATIENT)
Dept: NEUROLOGY | Facility: CLINIC | Age: 73
End: 2018-07-25

## 2018-07-25 DIAGNOSIS — E78.5 HYPERLIPIDEMIA, UNSPECIFIED HYPERLIPIDEMIA TYPE: Primary | ICD-10-CM

## 2018-07-25 RX ORDER — ATORVASTATIN CALCIUM 80 MG/1
80 TABLET, FILM COATED ORAL
Qty: 90 TABLET | Refills: 1 | Status: SHIPPED | OUTPATIENT
Start: 2018-07-25 | End: 2020-12-29 | Stop reason: SDUPTHER

## 2018-07-25 NOTE — TELEPHONE ENCOUNTER
I left a message for Mr. Izaguirre  And let him know Zahraa CARDONA reviewed your recent Lipid Panel  The LDL (bad cholesterol) is 67-goal is 40-70.  I you are on Lipitor 80 mg daily, Zahraa Zapata recommends continuing this dosage.  If you are taking Lipitor 40 mg continue this dose.  Please follow up with your PCP in 3-6 months for repeat labs.  MATTHEW De La Rosa RN

## 2018-07-25 NOTE — TELEPHONE ENCOUNTER
----- Message from DULCE Beth sent at 7/25/2018  3:38 PM EDT -----  Done. Just wanted to make sure 80mg was the right dose. He will need to see his PCP in 3 months for labs and they can take over managing his lipitor   ----- Message -----  From: Lola De La Rosa RN  Sent: 7/25/2018   3:26 PM  To: DULCE Beth    ;Mr. Izaguirre called me back and is taking Lipitor 80 mg but has run out.  He has not taken any for 2 days.  Can u put in a script [facility]?

## 2018-07-25 NOTE — TELEPHONE ENCOUNTER
I spoke with Ms. Izaguirre.  She did confirm he is taking Lipitor 80 mg daily but has run out of the medication.  I asked that they follow up with their PCP in 3 months for lab redraw.  MATTHEW De La Rosa RN

## 2018-07-25 NOTE — TELEPHONE ENCOUNTER
----- Message from DULCE Beth sent at 7/24/2018  4:55 PM EDT -----  Let him know his LDL is 67, goal is 40-70. If this is on lipitor 80mg I recommend he cont the liptior 80mg. If he is on 40mg then continue the current dose. FU with PCP in 3-6 months for repeat labs

## 2018-07-25 NOTE — TELEPHONE ENCOUNTER
Mr. Izaguirre returned my call.  I let him know his LDL is 67, goal is 40-70.  He confirmed he is taking Lipitor 80 mg daily, but has run out and not taken any the last 2 days. I will let Zahraa CARDONA know.  Please follow up with your PCP in 3-6 months for repeat labs.  MATTHEW De La Rosa RN

## 2018-08-09 ENCOUNTER — TELEPHONE (OUTPATIENT)
Dept: NEUROLOGY | Facility: CLINIC | Age: 73
End: 2018-08-09

## 2018-08-09 NOTE — TELEPHONE ENCOUNTER
I s/w pt and let him know that Dr. Azar already ordered a neuro psych test with [facility] which was completed 2017. So Zahraa filled out paperwork and he is aware the paperwork is ready and she recommends he have another evaluation next year (2019). He will  paper work tomorrow.

## 2018-08-09 NOTE — TELEPHONE ENCOUNTER
----- Message from DULCE Beth sent at 8/8/2018  4:33 PM EDT -----  Contact: [phone]  I doubt that will happen and that is out of my power. I can only do so much. I can't just write a letter with out everything been done  They may have a cancellation list at [facility]?  ----- Message -----  From: Maris Hernandez MA  Sent: 8/8/2018   3:43 PM  To: DULCE Beth    I s/w pt and he is ok to do the neuro psych eval, but the letter has to be turned in by the 18th of August.     ----- Message -----  From: Katya Zapata APRN  Sent: 8/8/2018  11:55 AM  To: Maris Hernandez MA    From a stroke point of view there is no reason he cannot drive, no vision loss. That is all I can say. I treat stroke. This paper talks about seizure, he does not have. It also talks about cognitive function. For this I recommend a neuro psych battery and then I can write a letter or sign the paper stating that cognitively he is safe to drive.    ----- Message -----  From: Maris Hernandez MA  Sent: 8/8/2018  11:20 AM  To: DULCE Beth    Papers are on your desk! Thanks!    ----- Message -----  From: Abigail Terrazas RegSched Rep  Sent: 8/7/2018  11:34 AM  To: Maris Hernandez MA    PT STOPPED IN AND DROPPED OFF [employer] LETTER THAT HE NEEDS KAT[facility] TO FILL OUT. PT ALSO HAS A COUPLE OF QUESTIONS REGARDING THE PAPERWORK. PT NEEDS PAPERWORK FILLED OUT BY AUGUST 18TH. PLEASE ADVISE AND CALL PT [phone]

## 2018-09-05 ENCOUNTER — OFFICE VISIT (OUTPATIENT)
Dept: FAMILY MEDICINE CLINIC | Facility: CLINIC | Age: 73
End: 2018-09-05

## 2018-09-05 VITALS
OXYGEN SATURATION: 99 % | HEIGHT: 70 IN | WEIGHT: 271.4 LBS | BODY MASS INDEX: 38.85 KG/M2 | SYSTOLIC BLOOD PRESSURE: 150 MMHG | HEART RATE: 62 BPM | DIASTOLIC BLOOD PRESSURE: 76 MMHG | TEMPERATURE: 98.1 F

## 2018-09-05 DIAGNOSIS — IMO0001 CLASS 2 OBESITY DUE TO EXCESS CALORIES WITH SERIOUS COMORBIDITY AND BODY MASS INDEX (BMI) OF 38.0 TO 38.9 IN ADULT: ICD-10-CM

## 2018-09-05 DIAGNOSIS — B35.1 ONYCHOMYCOSIS: ICD-10-CM

## 2018-09-05 DIAGNOSIS — E11.42 TYPE 2 DIABETES MELLITUS WITH DIABETIC POLYNEUROPATHY, WITHOUT LONG-TERM CURRENT USE OF INSULIN (HCC): Primary | ICD-10-CM

## 2018-09-05 LAB — HBA1C MFR BLD: 7.9 %

## 2018-09-05 PROCEDURE — 99214 OFFICE O/P EST MOD 30 MIN: CPT | Performed by: FAMILY MEDICINE

## 2018-09-05 PROCEDURE — 83036 HEMOGLOBIN GLYCOSYLATED A1C: CPT | Performed by: FAMILY MEDICINE

## 2018-09-05 NOTE — PROGRESS NOTES
Jesus Izaguirre Jr. is a 73 y.o. male.     Chief Complaint   Patient presents with   • Diabetes     Patient is needing his a1c checked    • Nail Problem     Patient is needing his toe nail on his right foot checked        HPI     Patient percent cells today to discuss a number of issues.  Patient has a history of type 2 diabetes.  3 months ago his hemoglobin A1c was 7.7.  He was instructed to take Janumet extended release 50/1002 tablets daily.  Patient states today that he's only been taking 1 tablet daily.  Has not made improvements in diet and exercise.  He voices concerns that it is been difficult for him to find low carbohydrate foods.  He is also developed thick brittle nails on his bilateral feet.  He also dropped a jar of peanut butter on his right great toenail which has caused significant pain and swelling.  Patient denies any fever or chills.  His current BMI is 38.9.  He was able to lose 2 pounds since her last office visit.    The following portions of the patient's history were reviewed and updated as appropriate: allergies, current medications, past family history, past medical history, past social history, past surgical history and problem list.    Review of Systems   Constitutional: Negative for fever.   All other systems reviewed and are negative.      Objective  Vitals:    09/05/18 0935   BP: 150/76   Pulse: 62   Temp: 98.1 °F (36.7 °C)   SpO2: 99%       Physical Exam   Constitutional: He is oriented to person, place, and time. He appears well-developed and well-nourished. No distress.   HENT:   Head: Normocephalic and atraumatic.   Right Ear: External ear normal.   Left Ear: External ear normal.   Nose: Nose normal.   Mouth/Throat: Oropharynx is clear and moist.   Eyes: Pupils are equal, round, and reactive to light. Conjunctivae and EOM are normal. Right eye exhibits no discharge. Left eye exhibits no discharge. No scleral icterus.   Cardiovascular: Normal rate, regular rhythm and normal  heart sounds.    Pulmonary/Chest: Effort normal and breath sounds normal. No respiratory distress. He has no wheezes. He has no rales.   Abdominal: Soft. Bowel sounds are normal. He exhibits no distension. There is no tenderness.   Musculoskeletal:   The right great toe is swollen and erythematous.  Some drainage noted from underneath the nail.     Skin Integrity  -  He has right foot onychomycosis.He has left foot onychomycosis..  Neurological: He is alert and oriented to person, place, and time.   Skin: Skin is warm and dry. He is not diaphoretic.   Nursing note and vitals reviewed.        Current Outpatient Prescriptions:   •  amLODIPine-benazepril (LOTREL) 10-40 MG per capsule, Take 1 capsule by mouth Daily., Disp: 90 capsule, Rfl: 3  •  apixaban (ELIQUIS) 5 MG tablet tablet, Take 5 mg by mouth., Disp: , Rfl:   •  aspirin 325 MG tablet, Take 1 tablet by mouth Daily., Disp: 90 tablet, Rfl: 3  •  atorvastatin (LIPITOR) 80 MG tablet, Take 1 tablet by mouth every night at bedtime., Disp: 90 tablet, Rfl: 1  •  furosemide (LASIX) 40 MG tablet, TAKE ONE TABLET BY MOUTH DAILY, Disp: 90 tablet, Rfl: 1  •  glucose blood test strip, Amber Contour Next Test In Vitro Strip; Patient Sig: Amber Contour Next Test In Vitro Strip TEST ONCE DAILY; 50; 3; 04-Mar-2015; Active, Disp: , Rfl:   •  propafenone (RYTHMOL) 150 MG tablet, Take 1 tablet by mouth 2 (Two) Times a Day., Disp: 60 tablet, Rfl: 6  •  SITagliptin-MetFORMIN HCl ER (JANUMET XR)  MG tablet sustained-release 24 hour, Take 2 tablets by mouth Daily., Disp: 60 tablet, Rfl: 11  •  vitamin B-12 (CYANOCOBALAMIN) 1000 MCG tablet, Take 1,000 mcg by mouth daily., Disp: , Rfl:   •  vitamin D (CHOLECALCIFEROL) 400 UNITS tablet, Take 400 Units by mouth daily., Disp: , Rfl:     Procedures    Lab Results (most recent)     None              Jesus was seen today for diabetes and nail problem.    Diagnoses and all orders for this visit:    Type 2 diabetes mellitus with diabetic  polyneuropathy, without long-term current use of insulin (CMS/[facility])  -     POC Glycosylated Hemoglobin (Hb A1C)  -     Ambulatory Referral to Podiatry    Onychomycosis  -     Ambulatory Referral to Podiatry    Class 2 obesity due to excess calories with serious comorbidity and body mass index (BMI) of 38.0 to 38.9 in adult    Hemoglobin A1c today 7.9.  This is getting worse.  It is time for us to make some changes.  Discussed treatment options.  We will start an SL G2 and once weekly injector.  Gave him a sample for 6 weeks.  Patient will start with 0.25 mg injected once weekly.  We'll recheck in 4 weeks.  Discussed potential adverse side effects.  Advised patient to continue his other medications.  We will refer to podiatry.  Discussed the need to improve diet and exercise.  This is paramount if the patient is going to have any progress at all.      Return in about 4 weeks (around 10/3/2018) for Recheck.      Shailesh Souza MD

## 2018-09-13 ENCOUNTER — TELEPHONE (OUTPATIENT)
Dept: FAMILY MEDICINE CLINIC | Facility: CLINIC | Age: 73
End: 2018-09-13

## 2018-09-13 NOTE — TELEPHONE ENCOUNTER
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 Pt requesting a physician letter stating that he can work and can perform all duties.

## 2018-09-17 ENCOUNTER — OFFICE VISIT (OUTPATIENT)
Dept: FAMILY MEDICINE CLINIC | Facility: CLINIC | Age: 73
End: 2018-09-17

## 2018-09-17 VITALS
HEIGHT: 70 IN | BODY MASS INDEX: 38.01 KG/M2 | DIASTOLIC BLOOD PRESSURE: 70 MMHG | HEART RATE: 61 BPM | WEIGHT: 265.5 LBS | OXYGEN SATURATION: 96 % | TEMPERATURE: 97.9 F | SYSTOLIC BLOOD PRESSURE: 126 MMHG

## 2018-09-17 DIAGNOSIS — E11.59 TYPE 2 DIABETES MELLITUS WITH OTHER CIRCULATORY COMPLICATION, WITHOUT LONG-TERM CURRENT USE OF INSULIN (HCC): ICD-10-CM

## 2018-09-17 PROCEDURE — 99213 OFFICE O/P EST LOW 20 MIN: CPT | Performed by: FAMILY MEDICINE

## 2018-09-17 NOTE — PROGRESS NOTES
Jesus Izaguirre Jr. is a 73 y.o. male.     Chief Complaint   Patient presents with   • Diabetes     Patient is needing his janumet and test strips refilled. He is needing a letter typed that states he is ok to drive a company cars        HPI     Patient presents in office today to follow-up on diabetes.  Was seen a month ago.  In need of refills of his Janumet extended release.  Also in need of test strips.  Patient recently got a new job and needs a letter stating that he's okay to operate a motor vehicle.  Checking blood sugars at home.  States that the averages are in the 115s.    The following portions of the patient's history were reviewed and updated as appropriate: allergies, current medications, past family history, past medical history, past social history, past surgical history and problem list.    Review of Systems   Constitutional: Negative for fever.   All other systems reviewed and are negative.      Objective  Vitals:    09/17/18 0809   BP: 126/70   Pulse: 61   Temp: 97.9 °F (36.6 °C)   SpO2: 96%       Physical Exam   Constitutional: He is oriented to person, place, and time. He appears well-developed and well-nourished. No distress.   HENT:   Head: Normocephalic and atraumatic.   Right Ear: External ear normal.   Left Ear: External ear normal.   Nose: Nose normal.   Mouth/Throat: Oropharynx is clear and moist.   Eyes: Pupils are equal, round, and reactive to light. Conjunctivae and EOM are normal. Right eye exhibits no discharge. Left eye exhibits no discharge. No scleral icterus.   Cardiovascular: Normal rate, regular rhythm and normal heart sounds.    Pulmonary/Chest: Effort normal and breath sounds normal. No respiratory distress. He has no wheezes. He has no rales.   Abdominal: Soft. Bowel sounds are normal. He exhibits no distension. There is no tenderness.   Neurological: He is alert and oriented to person, place, and time.   Skin: Skin is warm and dry. He is not diaphoretic.   Nursing note  and vitals reviewed.        Current Outpatient Prescriptions:   •  amLODIPine-benazepril (LOTREL) 10-40 MG per capsule, Take 1 capsule by mouth Daily., Disp: 90 capsule, Rfl: 3  •  apixaban (ELIQUIS) 5 MG tablet tablet, Take 5 mg by mouth., Disp: , Rfl:   •  aspirin 325 MG tablet, Take 1 tablet by mouth Daily., Disp: 90 tablet, Rfl: 3  •  atorvastatin (LIPITOR) 80 MG tablet, Take 1 tablet by mouth every night at bedtime., Disp: 90 tablet, Rfl: 1  •  furosemide (LASIX) 40 MG tablet, TAKE ONE TABLET BY MOUTH DAILY, Disp: 90 tablet, Rfl: 1  •  glucose blood test strip, Amber Contour Next Test In Vitro Strip; Patient Sig: Amber Contour Next Test In Vitro Strip TEST ONCE DAILY; 50; 3; 04-Mar-2015; Active, Disp: 100 each, Rfl: 3  •  propafenone (RYTHMOL) 150 MG tablet, Take 1 tablet by mouth 2 (Two) Times a Day., Disp: 60 tablet, Rfl: 6  •  SITagliptin-MetFORMIN HCl ER (JANUMET XR)  MG tablet, Take 2 tablets by mouth Daily., Disp: 60 tablet, Rfl: 11  •  vitamin B-12 (CYANOCOBALAMIN) 1000 MCG tablet, Take 1,000 mcg by mouth daily., Disp: , Rfl:   •  vitamin D (CHOLECALCIFEROL) 400 UNITS tablet, Take 400 Units by mouth daily., Disp: , Rfl:   Current outpatient and discharge medications have been reconciled for the patient.  Reviewed by: Shailesh Souza MD      Procedures    Lab Results (most recent)     None                  Jesus was seen today for diabetes.    Diagnoses and all orders for this visit:    Type 2 diabetes mellitus with other circulatory complication, without long-term current use of insulin (CMS/[facility])  -     glucose blood test strip; Amber Contour Next Test In Vitro Strip; Patient Sig: Amber Contour Next Test In Vitro Strip TEST ONCE DAILY; 50; 3; 04-Mar-2015; Active  -     SITagliptin-MetFORMIN HCl ER (JANUMET XR)  MG tablet; Take 2 tablets by mouth Daily.    Refills sent to the patient's pharmacy.  Letter was dictated detailing his ability to operate motor vehicles.  Patient is in need of a  Medicare wellness visit.  Appointment scheduled today.      Return in about 4 weeks (around 10/15/2018) for Medicare wellness exam.      Shailesh Souza MD

## 2018-12-13 DIAGNOSIS — E11.59 TYPE 2 DIABETES MELLITUS WITH OTHER CIRCULATORY COMPLICATION, WITHOUT LONG-TERM CURRENT USE OF INSULIN (HCC): ICD-10-CM

## 2018-12-21 ENCOUNTER — TELEPHONE (OUTPATIENT)
Dept: FAMILY MEDICINE CLINIC | Facility: CLINIC | Age: 73
End: 2018-12-21

## 2018-12-21 RX ORDER — AMLODIPINE BESYLATE AND BENAZEPRIL HYDROCHLORIDE 10; 40 MG/1; MG/1
1 CAPSULE ORAL DAILY
Qty: 30 CAPSULE | Refills: 11 | Status: SHIPPED | OUTPATIENT
Start: 2018-12-21 | End: 2019-12-21

## 2018-12-21 NOTE — TELEPHONE ENCOUNTER
Pt requesting ninety day supply of amLODIPine-benazepril (LOTREL) 10-40 MG per capsule to [facility] pharmacy.

## 2019-03-11 ENCOUNTER — HOSPITAL ENCOUNTER (OUTPATIENT)
Dept: CARDIOLOGY | Facility: HOSPITAL | Age: 74
Discharge: HOME | End: 2019-03-11
Admitting: NURSE PRACTITIONER

## 2019-03-11 DIAGNOSIS — I69.30 CHRONIC ISCHEMIC RIGHT MCA STROKE: ICD-10-CM

## 2019-03-11 DIAGNOSIS — I66.01 OCCLUSION AND STENOSIS OF RIGHT MIDDLE CEREBRAL ARTERY: ICD-10-CM

## 2019-03-11 DIAGNOSIS — I77.9 CAROTID DISEASE, BILATERAL (HCC): ICD-10-CM

## 2019-03-11 PROCEDURE — 93880 EXTRACRANIAL BILAT STUDY: CPT

## 2019-03-12 LAB
BH CV XLRA MEAS LEFT DIST CCA EDV: 14.1 CM/SEC
BH CV XLRA MEAS LEFT DIST CCA PSV: 66.3 CM/SEC
BH CV XLRA MEAS LEFT DIST ICA EDV: -21.6 CM/SEC
BH CV XLRA MEAS LEFT DIST ICA PSV: -58.1 CM/SEC
BH CV XLRA MEAS LEFT ICA/CCA RATIO: 1.4
BH CV XLRA MEAS LEFT MID ICA EDV: -28.3 CM/SEC
BH CV XLRA MEAS LEFT MID ICA PSV: -84.1 CM/SEC
BH CV XLRA MEAS LEFT PROX CCA EDV: 16.5 CM/SEC
BH CV XLRA MEAS LEFT PROX CCA PSV: 79.4 CM/SEC
BH CV XLRA MEAS LEFT PROX ECA EDV: -9.4 CM/SEC
BH CV XLRA MEAS LEFT PROX ECA PSV: -120 CM/SEC
BH CV XLRA MEAS LEFT PROX ICA EDV: -25.8 CM/SEC
BH CV XLRA MEAS LEFT PROX ICA PSV: -93.2 CM/SEC
BH CV XLRA MEAS LEFT PROX SCLA PSV: 203 CM/SEC
BH CV XLRA MEAS LEFT VERTEBRAL A EDV: -14.1 CM/SEC
BH CV XLRA MEAS LEFT VERTEBRAL A PSV: -39.7 CM/SEC
BH CV XLRA MEAS RIGHT CCA RATIO VEL: 63.6 CM/SEC
BH CV XLRA MEAS RIGHT DIST CCA EDV: 10.2 CM/SEC
BH CV XLRA MEAS RIGHT DIST CCA PSV: 63.6 CM/SEC
BH CV XLRA MEAS RIGHT DIST ICA EDV: -12.6 CM/SEC
BH CV XLRA MEAS RIGHT DIST ICA PSV: -40.8 CM/SEC
BH CV XLRA MEAS RIGHT ICA RATIO VEL: -63.2 CM/SEC
BH CV XLRA MEAS RIGHT ICA/CCA RATIO: 0.8
BH CV XLRA MEAS RIGHT MID ICA EDV: -15.3 CM/SEC
BH CV XLRA MEAS RIGHT MID ICA PSV: -43.2 CM/SEC
BH CV XLRA MEAS RIGHT PROX CCA EDV: 8.2 CM/SEC
BH CV XLRA MEAS RIGHT PROX CCA PSV: 70.9 CM/SEC
BH CV XLRA MEAS RIGHT PROX ECA EDV: -11 CM/SEC
BH CV XLRA MEAS RIGHT PROX ECA PSV: -126 CM/SEC
BH CV XLRA MEAS RIGHT PROX ICA EDV: 7.9 CM/SEC
BH CV XLRA MEAS RIGHT PROX ICA PSV: 53.8 CM/SEC
BH CV XLRA MEAS RIGHT PROX SCLA PSV: 99.7 CM/SEC
BH CV XLRA MEAS RIGHT VERTEBRAL A EDV: 9 CM/SEC
BH CV XLRA MEAS RIGHT VERTEBRAL A PSV: 38.1 CM/SEC
LEFT ARM BP: NORMAL MMHG
RIGHT ARM BP: NORMAL MMHG

## 2019-03-15 ENCOUNTER — TELEPHONE (OUTPATIENT)
Dept: FAMILY MEDICINE CLINIC | Facility: CLINIC | Age: 74
End: 2019-03-15

## 2019-03-15 DIAGNOSIS — S46.912A STRAIN OF LEFT SHOULDER, INITIAL ENCOUNTER: Primary | ICD-10-CM

## 2019-03-15 NOTE — TELEPHONE ENCOUNTER
Ok per Dr. Souza to refer pt to ortho. Can you please enter Noran?  You can use dx - strain of left shoulder

## 2019-03-15 NOTE — TELEPHONE ENCOUNTER
Pt had a fall and went to [facility] this week since you were not present. He was told he broke his hand (referred to [facility]) and tore his rotator cuff. They did not refer him to an ortho for this and pt is curious if we can please order for him to be seen by ortho? You do not have any appts for a couple weeks so I was advised to send a referral request. Please advise.

## 2019-03-29 ENCOUNTER — OFFICE VISIT (OUTPATIENT)
Dept: NEUROLOGY | Facility: CLINIC | Age: 74
End: 2019-03-29

## 2019-03-29 VITALS
WEIGHT: 253.4 LBS | OXYGEN SATURATION: 97 % | SYSTOLIC BLOOD PRESSURE: 130 MMHG | DIASTOLIC BLOOD PRESSURE: 80 MMHG | HEIGHT: 71 IN | BODY MASS INDEX: 35.48 KG/M2 | HEART RATE: 67 BPM

## 2019-03-29 DIAGNOSIS — R41.3 MEMORY IMPAIRMENT: ICD-10-CM

## 2019-03-29 DIAGNOSIS — E11.42 TYPE 2 DIABETES MELLITUS WITH DIABETIC POLYNEUROPATHY, WITHOUT LONG-TERM CURRENT USE OF INSULIN (HCC): ICD-10-CM

## 2019-03-29 DIAGNOSIS — G47.33 OBSTRUCTIVE SLEEP APNEA SYNDROME: ICD-10-CM

## 2019-03-29 DIAGNOSIS — Z72.0 TOBACCO ABUSE: ICD-10-CM

## 2019-03-29 DIAGNOSIS — E78.2 MIXED HYPERLIPIDEMIA: ICD-10-CM

## 2019-03-29 DIAGNOSIS — I69.30 CHRONIC ISCHEMIC RIGHT MCA STROKE: Primary | ICD-10-CM

## 2019-03-29 DIAGNOSIS — I10 ESSENTIAL HYPERTENSION: ICD-10-CM

## 2019-03-29 DIAGNOSIS — I48.0 PAROXYSMAL ATRIAL FIBRILLATION (HCC): ICD-10-CM

## 2019-03-29 PROCEDURE — 99215 OFFICE O/P EST HI 40 MIN: CPT | Performed by: NURSE PRACTITIONER

## 2019-03-29 RX ORDER — HYDROCODONE BITARTRATE AND ACETAMINOPHEN 7.5; 325 MG/1; MG/1
TABLET ORAL
COMMUNITY
Start: 2019-03-22 | End: 2019-11-18

## 2019-03-29 RX ORDER — LANOLIN ALCOHOL/MO/W.PET/CERES
CREAM (GRAM) TOPICAL
Status: ON HOLD | COMMUNITY
End: 2022-01-01

## 2019-04-07 PROBLEM — E66.9 CLASS 2 OBESITY IN ADULT: Status: ACTIVE | Noted: 2019-04-07

## 2019-04-15 ENCOUNTER — OFFICE VISIT (OUTPATIENT)
Dept: ORTHOPEDIC SURGERY | Facility: CLINIC | Age: 74
End: 2019-04-15

## 2019-04-15 VITALS — WEIGHT: 253 LBS | TEMPERATURE: 98.5 F | BODY MASS INDEX: 35.42 KG/M2 | HEIGHT: 71 IN

## 2019-04-15 DIAGNOSIS — M25.512 PAIN IN SHOULDER REGION, LEFT: Primary | ICD-10-CM

## 2019-04-15 DIAGNOSIS — M75.100 TEAR OF ROTATOR CUFF, UNSPECIFIED LATERALITY, UNSPECIFIED TEAR EXTENT: ICD-10-CM

## 2019-04-15 PROCEDURE — 20610 DRAIN/INJ JOINT/BURSA W/O US: CPT | Performed by: ORTHOPAEDIC SURGERY

## 2019-04-15 PROCEDURE — 99204 OFFICE O/P NEW MOD 45 MIN: CPT | Performed by: ORTHOPAEDIC SURGERY

## 2019-04-15 PROCEDURE — 73030 X-RAY EXAM OF SHOULDER: CPT | Performed by: ORTHOPAEDIC SURGERY

## 2019-04-15 RX ORDER — SULFAMETHOXAZOLE AND TRIMETHOPRIM 800; 160 MG/1; MG/1
TABLET ORAL
COMMUNITY
Start: 2019-04-09 | End: 2019-11-18

## 2019-04-15 RX ADMIN — METHYLPREDNISOLONE ACETATE 80 MG: 80 INJECTION, SUSPENSION INTRA-ARTICULAR; INTRALESIONAL; INTRAMUSCULAR; SOFT TISSUE at 13:08

## 2019-04-15 NOTE — PROGRESS NOTES
Patient: Jesus Izaguirre Jr.    YOB: 1945    Medical Record Number: [id number]    Chief Complaints:   Left shoulder pain    History of Present Illness:     73 y.o. male patient who presents with left shoulder pain and weakness.  His symptoms began after a fall about a month ago.  He also broke his right hand and ended up having surgery for that about 2 weeks ago.  He tells me that he was unable to raise the left arm after the fall.  That has since gotten somewhat better.  He has moderate intermittent aching pain in the shoulder associated with occasional sharp stabbing pains.  Symptoms are worse with reaching, lifting, driving and at night.  Rest helps somewhat.  He reports good use and function of the left arm prior to the fall.  He is right-hand dominant.    Allergies: No Known Allergies    Home Medications:    Current Outpatient Medications:   •  amLODIPine-benazepril (LOTREL) 10-40 MG per capsule, Take 1 capsule by mouth Daily., Disp: 30 capsule, Rfl: 11  •  apixaban (ELIQUIS) 5 MG tablet tablet, Take 5 mg by mouth., Disp: , Rfl:   •  aspirin 325 MG tablet, Take 1 tablet by mouth Daily., Disp: 90 tablet, Rfl: 3  •  atorvastatin (LIPITOR) 80 MG tablet, Take 1 tablet by mouth every night at bedtime., Disp: 90 tablet, Rfl: 1  •  furosemide (LASIX) 40 MG tablet, TAKE ONE TABLET BY MOUTH DAILY, Disp: 90 tablet, Rfl: 1  •  glucose blood test strip, Amber Contour Next Test In Vitro Strip; Patient Sig: Amber Contour Next Test In Vitro Strip TEST ONCE DAILY; 50; 3; 04-Mar-2015; Active, Disp: 100 each, Rfl: 3  •  HYDROcodone-acetaminophen (NORCO) 7.5-325 MG per tablet, , Disp: , Rfl:   •  propafenone (RYTHMOL) 150 MG tablet, Take 1 tablet by mouth 2 (Two) Times a Day., Disp: 60 tablet, Rfl: 6  •  SITagliptin-MetFORMIN HCl ER (JANUMET XR)  MG tablet, Take 2 tablets by mouth Daily., Disp: 180 tablet, Rfl: 3  •  sulfamethoxazole-trimethoprim (BACTRIM DS,SEPTRA DS) 800-160 MG per tablet, , Disp: , Rfl:    •  vitamin B-12 (CYANOCOBALAMIN) 1000 MCG tablet, Take  by mouth., Disp: , Rfl:   •  vitamin D (CHOLECALCIFEROL) 400 UNITS tablet, Take 400 Units by mouth daily., Disp: , Rfl:     Past Medical History:   Diagnosis Date   • Asthma    • BPH (benign prostatic hypertrophy)    • Class 2 obesity in adult 4/7/2019   • Diabetes mellitus (CMS/[facility])    • Fatigue    • HLD (hyperlipidemia) 7/5/2016   • Hypertension    • Nephrolithiasis    • Stroke (CMS/HCC) 12/2015   • Tobacco abuse 3/24/2018   • Type 2 diabetes mellitus with diabetic polyneuropathy, without long-term current use of insulin (CMS/[facility])    • Venous insufficiency        Past Surgical History:   Procedure Laterality Date   • LITHOTRIPSY      Renal       Social History     Occupational History   • Not on file   Tobacco Use   • Smoking status: Former Smoker     Packs/day: 1.50   • Smokeless tobacco: Former User   • Tobacco comment: Cessation 12/2014; HX of 1 ppd for 52 yrs.   Substance and Sexual Activity   • Alcohol use: No   • Drug use: No   • Sexual activity: Not on file      Social History     Social History Narrative   • Not on file       Family History   Problem Relation Age of Onset   • Arthritis Mother    • Cancer Mother    • Migraines Mother    • Hypertension Other        Review of Systems:      Constitutional: Denies fever, shaking or chills   Eyes: Denies change in visual acuity   HEENT: Denies nasal congestion or sore throat   Respiratory: Denies cough or shortness of breath   Cardiovascular: Denies chest pain or edema  Endocrine: Denies tremors, palpitations, intolerance of heat or cold, polyuria, polydipsia.  GI: Denies abdominal pain, nausea, vomiting, bloody stools or diarrhea  : Denies frequency, urgency, incontinence, retention, or nocturia.  Musculoskeletal: Denies numbness, tingling or loss of motor function except as above  Integument: Denies rash, lesion or ulceration   Neurologic: Denies headache or focal weakness, deficits  Heme: Denies  "spontaneous or excessive bleeding, epistaxis, hematuria, melena, fatigue, enlarged or tender lymph nodes.      All other pertinent positives and negatives as noted above in HPI.    Physical Exam: 73 y.o. male    Vitals:    04/15/19 1135   Temp: 98.5 °F (36.9 °C)   TempSrc: Temporal   Weight: 115 kg (253 lb)   Height: 180.3 cm (71\")     General:  Patient is awake and alert.  Appears in no acute distress or discomfort.    Psych:  Affect and demeanor are appropriate.    Eyes:  Conjunctiva and sclera appear grossly normal.  Eyes track well and EOM seem to be intact.    Ears:  No gross abnormalities.  Hearing adequate for the exam.    Cardiovascular:  Regular rate and rhythm.    Lungs:  Good chest expansion.  Breathing unlabored.    Lymph:  No palpable adenopathy about neck or axilla.    Neck:  Supple.  Normal ROM.  Negative Spurling's for shoulder or arm pain.    Left upper extremity:  Skin is benign.  No gross abnormalities on inspection including any atrophy, swellings, or masses.  No palpable masses or adenopathy.  Mild anterior tenderness with a trace bursal effusion.  He has kaitlin pseudoparalysis of the right shoulder.  He can maintain his arm in an elevated position when I raise it for him.  Negative external rotation lag sign.  Positive belly press.  No evident instability or apprehension.  Mildly positive Neer, Champion. Weakness with forward elevation and external rotation.  Good strength in his biceps, triceps, and .  Intact sensation throughout the arm.  Brisk cap refill.  Palpable radial pulse.  Good skin turgor.         Radiology:   Outside AP and orthogonal views of the left shoulder are reviewed to evaluate the patient's complaint.  No comparison films are immediately available.  The x-rays show a diminished acromiohumeral interval and mild glenohumeral degenerative changes.  There are no obvious acute abnormalities, lesions, masses, or other concerning findings.     Assessment/Plan:   Left rotator " cuff tear    Discussed options in detail including conservative versus surgical options. I have recommended that we start with a conservative approach. With regards to conservative options, we discussed appropriate activity modifications, icing as needed, anti-inflammatories, physical therapy, and injections.  Patient has acknowledged understanding of this information and elected for an injection and some therapy.  The risks, benefits and alternatives to the injection were discussed.  He consented.  I have given him a referral to physical therapy.  I will see him back in 6 weeks.    Jeffy oCnrad MD    04/15/2019    CC to Shailesh Souza MD     Large Joint Arthrocentesis: L subacromial bursa  Date/Time: 4/15/2019 1:08 PM  Consent given by: patient  Site marked: site marked  Timeout: Immediately prior to procedure a time out was called to verify the correct patient, procedure, equipment, support staff and site/side marked as required   Supporting Documentation  Indications: pain   Procedure Details  Location: shoulder - L subacromial bursa  Preparation: Patient was prepped and draped in the usual sterile fashion  Needle gauge: 21 G.  Approach: posterior  Medications administered: 80 mg methylPREDNISolone acetate 80 MG/ML; 2 mL lidocaine (cardiac) 20 MG/ML  Patient tolerance: patient tolerated the procedure well with no immediate complications

## 2019-04-16 RX ORDER — METHYLPREDNISOLONE ACETATE 80 MG/ML
80 INJECTION, SUSPENSION INTRA-ARTICULAR; INTRALESIONAL; INTRAMUSCULAR; SOFT TISSUE
Status: COMPLETED | OUTPATIENT
Start: 2019-04-15 | End: 2019-04-15

## 2019-04-19 ENCOUNTER — HOSPITAL ENCOUNTER (OUTPATIENT)
Dept: PHYSICAL THERAPY | Facility: HOSPITAL | Age: 74
Setting detail: THERAPIES SERIES
Discharge: HOME | End: 2019-04-19

## 2019-04-19 DIAGNOSIS — M25.512 LEFT SHOULDER PAIN, UNSPECIFIED CHRONICITY: Primary | ICD-10-CM

## 2019-04-19 DIAGNOSIS — M75.102 TEAR OF LEFT ROTATOR CUFF, UNSPECIFIED TEAR EXTENT: ICD-10-CM

## 2019-04-19 PROCEDURE — 97110 THERAPEUTIC EXERCISES: CPT | Performed by: PHYSICAL THERAPIST

## 2019-04-19 PROCEDURE — 97162 PT EVAL MOD COMPLEX 30 MIN: CPT | Performed by: PHYSICAL THERAPIST

## 2019-04-19 NOTE — THERAPY EVALUATION
Outpatient Physical Therapy Ortho Initial Evaluation  [facility]     Patient Name: Jesus Izaguirre Jr.  : 1945  MRN: [id number]  Today's Date: 2019      Visit Date: 2019    Patient Active Problem List   Diagnosis   • Chronic ischemic right MCA stroke   • Acute bronchitis   • AB (asthmatic bronchitis)   • BPH (benign prostatic hyperplasia)   • Bradycardia   • Cough   • Dribbling from mouth   • Fatigue   • Febrile   • Gout   • Hyperlipidemia   • Hypertension   •  flu   • Obstructive sleep apnea syndrome   • Breath shortness   • Type 2 diabetes mellitus with diabetic polyneuropathy, without long-term current use of insulin (CMS/HCC)   • Vitamin B12 deficiency   • Vitamin D deficiency   • History of lithotripsy   • Calculus of kidney   • Onychia of finger   • Paronychia of finger   • Venous stasis   • Memory impairment   • Neuropathy   • Acute pain of right knee   • Atrial fibrillation (CMS/HCC)   • Tobacco abuse   • Class 2 obesity in adult        Past Medical History:   Diagnosis Date   • Asthma    • BPH (benign prostatic hypertrophy)    • Class 2 obesity in adult 2019   • Diabetes mellitus (CMS/HCC)    • Fatigue    • HLD (hyperlipidemia) 2016   • Hypertension    • Nephrolithiasis    • Stroke (CMS/HCC) 2015   • Tobacco abuse 3/24/2018   • Type 2 diabetes mellitus with diabetic polyneuropathy, without long-term current use of insulin (CMS/HCC)    • Venous insufficiency         Past Surgical History:   Procedure Laterality Date   • LITHOTRIPSY      Renal       Visit Dx:     ICD-10-CM ICD-9-CM   1. Left shoulder pain, unspecified chronicity M25.512 719.41   2. Tear of left rotator cuff, unspecified tear extent M75.102 840.4         Patient History     Row Name 19 1400             History    Chief Complaint  Difficulty Walking;Difficulty with daily activities;Muscle weakness;Pain  -      Date Current Problem(s) Began  19  -      Brief Description of Current  Complaint  Pt was walking down his driveway, tripped and fell. He had a broken bone in the R hand that Kleinert and Maryana fixed for him. Doing well with that. He is having issues with the L shoulder. He got an injection in  it and he is able to move it a little better. He lives alone and reports that he has bad knees. His son is living with him right now. Able to drive now, wasnt a few weeks ago. Pt lost his wife about 4 weeks ago  -      Patient/Caregiver Goals  Relieve pain;Return to prior level of function;Improve mobility;Improve strength  -      Patient's Rating of General Health  Fair  -      Hand Dominance  right-handed  -      Occupation/sports/leisure activities  retired; builds model cars  -      What clinical tests have you had for this problem?  X-ray  -         Pain     Pain Location  Shoulder left  -      Pain at Present  1  -      Pain at Worst  7  -      Pain Frequency  Constant/continuous;Intermittent  -      Pain Description  Aching;Sore  -      What Performance Factors Make the Current Problem(s) WORSE?  lifting, carrying, raising arm  -      What Performance Factors Make the Current Problem(s) BETTER?  pain meds, Tylenol, hot shower  -      Is your sleep disturbed?  No  -         Fall Risk Assessment    Any falls in the past year:  Yes  -      Number of falls reported in the last 12 months  2  -      Factors that contributed to the fall:  Tripped carrying things both falls  -         Services    Prior Rehab/Home Health Experiences  Yes  -      When was the prior experience with Rehab/Home Health  years ago with wound care for a venus stasis ulcer at [facility]  -         Daily Activities    Primary Language  English  -      Teaching needs identified  Home Exercise Program;Management of Condition  -      Patient is concerned about/has problems with  Performing home management (household chores, shopping, care of dependents);Reaching over head;Repetitive movements  of the hand, arm, shoulder  -LH      Does patient have problems with the following?  None  -LH      Barriers to learning  None  -LH      Pt Participated in POC and Goals  Yes  -LH         Safety    Are you being hurt, hit, or frightened by anyone at home or in your life?  No  -LH      Are you being neglected by a caregiver  No  -LH        User Key  (r) = Recorded By, (t) = Taken By, (c) = Cosigned By    Initials Name Provider Type     Anila Alvarado PT Physical Therapist          PT Ortho     Row Name 04/19/19 1400       Posture/Observations    Forward Head  Moderate  -LH    Thoracic Kyphosis  Increased;Mild;Moderate  -LH    Rounded Shoulders  Moderate;Increased  -LH       Shoulder Impingement/Rotator Cuff Special Tests    Lift-Off Test (Subscapularis Lesion)  Left:;Negative  -LH       Shoulder Girdle Palpation    Subacromial Space  Left:;Tender  -LH       Right Upper Ext    Rt Shoulder Abduction AROM  100 deg  -LH    Rt Shoulder Abduction PROM  155 deg  -LH    Rt Shoulder Flexion AROM  130 deg  -LH    Rt Shoulder Flexion PROM  150 deg  -LH    Rt Shoulder External Rotation AROM  T1  -LH    Rt Shoulder External Rotation PROM  77 deg  -LH    Rt Shoulder Internal Rotation AROM  L1  -LH    Rt Shoulder Internal Rotation PROM  75 deg  -LH       Left Upper Ext    Lt Shoulder Abduction AROM  60 deg  -LH    Lt Shoulder Abduction PROM  145 deg  -LH    Lt Shoulder Flexion AROM  60 deg  -LH    Lt Shoulder Flexion PROM  145 deg  -LH    Lt Shoulder External Rotation AROM  C6  -LH    Lt Shoulder External Rotation PROM  68 deg  -LH    Lt Shoulder Internal Rotation AROM  L3  -LH    Lt Shoulder Internal Rotation PROM  75 deg  -LH       MMT Right Upper Ext    Rt Shoulder Flexion MMT, Gross Movement  (4-/5) good minus  -LH    Rt Shoulder ABduction MMT, Gross Movement  (4-/5) good minus  -LH    Rt Shoulder Internal Rotation MMT, Gross Movement  (4/5) good  -LH    Rt Shoulder External Rotation MMT, Gross Movement  (4/5) good  -LH        MMT Left Upper Ext    Lt Shoulder Flexion MMT, Gross Movement  (3-/5) fair minus  -LH    Lt Shoulder ABduction MMT, Gross Movement  (3-/5) fair minus  -LH    Lt Shoulder Internal Rotation MMT, Gross Movement  (3+/5) fair plus  -LH    Lt Shoulder External Rotation MMT, Gross Movement  (3-/5) fair minus  -LH    Lt Upper Extremity Comments   tested in neutral  -      User Key  (r) = Recorded By, (t) = Taken By, (c) = Cosigned By    Initials Name Provider Type     Anila Alvarado, PT Physical Therapist                      Therapy Education  Education Details: anatomy of shoulder, RC surgery  Given: HEP, Symptoms/condition management, Pain management, Posture/body mechanics  Program: New  How Provided: Verbal, Demonstration, Written  Provided to: Patient  Level of Understanding: Verbalized, Teach back education performed, Demonstrated     PT OP Goals     Row Name 04/19/19 1600          PT Short Term Goals    STG 1  Patient will be independent with education for symptom management, joint protection and strategies to minimize stress on affected tissues  -     STG 1 Progress  New  [facility]     STG 2  Pt to improve L shoulder PROM in flex=150 deg, abd=150 deg, ER=75 deg  -     STG 2 Progress  [facility]     STG 3  Pt to improve pain complaints to no more than 4/10 with ADLs  -     STG 3 Progress  [facility]        Long Term Goals    LTG 1  Pt to improve L shoulder AROM in flex=100 deg, abd=90 deg, ER=T1 and IR=L1  -     LTG 1 Progress  [facility]  [facility]     LTG 2  Pt to improve shoulder strength to 3 to 3+/5  -     LTG 2 Progress  [facility]     LTG 3  Pt to improve DASH score from 53% to 43% for overall functional improvement  -     LTG 3 Progress  [facility]  [facility]     LTG 4  Patient will be independent and compliant with advanced home exercise program to facilitate self-management of symptoms.  -     LTG 4 Progress  New  -LH        Time Calculation    PT Goal Re-Cert Due Date  07/19/19  -       User Key  (r) = Recorded  By, (t) = Taken By, (c) = Cosigned By    Initials Name Provider Type     Anila Alvarado, PT Physical Therapist          PT Assessment/Plan     Row Name 04/19/19 1600          PT Assessment    Functional Limitations  Limitation in home management;Limitations in community activities;Limitations in functional capacity and performance;Performance in leisure activities;Performance in self-care ADL  -     Impairments  Joint integrity;Joint mobility;Muscle strength;Pain;Posture;Poor body mechanics;Range of motion  -     Assessment Comments  Jesus Izaguirre Jr. is a 73 y.o. year-old male referred to physical therapy for L shoulder pain after a fall about a month ago. He tripped carrying some boxes out to his vehicle. He presents with a evolving clinical presentation.  He has comorbidities of diabetes and probable RC tear that may affect his progress in the plan of care.  He has decreased ROM in flex=60/145 deg, abd=60/145 deg, ER=C6/68 deg, and IR=L3/75 deg, decreased strength with MMT, and tenderness over the subacromial area and UT. Pt scored a 53% on the DASH, with 0% being no disability. Pt would benefit from therapy to help improve his ability to groom, dress, carry groceries, and perform household chores.  -     Please refer to paper survey for additional self-reported information  Yes  -     Rehab Potential  Fair  -     Patient/caregiver participated in establishment of treatment plan and goals  Yes  -     Patient would benefit from skilled therapy intervention  Yes  -        PT Plan    PT Frequency  2x/week  -     Predicted Duration of Therapy Intervention (Therapy Eval)  4-6 weeks  -     Planned CPT's?  PT EVAL MOD COMPLELITY: 01369;PT THER PROC EA 15 MIN: 68071;PT MANUAL THERAPY EA 15 MIN: 61468;PT HOT OR COLD PACK TREAT MCARE  -     Physical Therapy Interventions (Optional Details)  home exercise program;joint mobilization;manual therapy techniques;modalities;patient/family  education;postural re-education;ROM (Range of Motion);strengthening;stretching  -     PT Plan Comments  plan to see pt for skilled therapy to address his ROM, strength, and tolerance to functional activities  -       User Key  (r) = Recorded By, (t) = Taken By, (c) = Cosigned By    Initials Name Provider Type    Anila Hearn, PT Physical Therapist            Exercises     Row Name 04/19/19 1600             Total Minutes    75981 - PT Therapeutic Exercise Minutes  10  -LH         Exercise 1    Exercise Name 1  shoulder rolls and scap ret  -LH      Sets 1  1  -LH      Reps 1  10  -LH         Exercise 2    Exercise Name 2  AAROM ER w/cane  -LH      Sets 2  1  -LH      Reps 2  10  -LH      Time 2  10 sec  -LH         Exercise 3    Exercise Name 3  supine press ups with cane  -LH      Sets 3  1  -LH      Reps 3  10  -LH         Exercise 4    Exercise Name 4  table slides, thumb up  -      Sets 4  1  -LH      Reps 4  10  -LH      Time 4  5-10 sec  -        User Key  (r) = Recorded By, (t) = Taken By, (c) = Cosigned By    Initials Name Provider Type     Anila Alvarado, PT Physical Therapist                        Outcome Measure Options: Disabilities of the Arm, Shoulder, and Hand (DASH)  DASH  Open a tight or new jar.: Severe Difficulty  Write: Moderate Difficulty  Turn a key: Moderate Difficulty  Prepare a meal: Moderate Difficulty  Push open a heavy door: Severe Difficulty  Place an object on a shelf above your head: Severe Difficulty  Do heavy household chores (e.g., wash walls, wash floors): Moderate Difficulty  Garden or do yard work: No Difficulty  Make a bed: Moderate Difficulty  Carry a shopping bag or briefcase: Severe Difficulty  Carry a heavy object (over 10 lbs): Severe Difficulty  Change a lightbulb overhead: Mild Difficulty  Wash or blow dry your hair: Severe Difficulty  Wash your back: Severe Difficulty  Put on a pullover sweater: Severe Difficulty  Use a knife to cut food: Moderate  Difficulty  Recreational activities in which require little effort (e.g., cardplaying, knitting, etc.): Moderate Difficulty  Recreational activities in which you take some force or impact through your arm, should or hand (e.g. golf, hammering, tennis, etc.): Mild Difficulty  Recreational Activities in which you move your arm freely (e.g., frisbee, badminton, etc.): Mild Difficulty  Manage transportation needs (getting from one place to another): Mild Difficulty  Sexual Activities: Mild Difficulty  During the past week, to what extent has your arm, shoulder, or hand problem interfered with your normal social activites with family, friends, neighbors or groups?: Quite a bit  During the past week, were you limited in your work or other regular daily activities as a result of your arm, shoulder or hand problem?: Very limited  Arm, Shoulder, or hand pain: Moderate  Arm, shoulder or hand pain when you performed any specific activity: Moderate  Tingling (pins and needles) in your arm, shoulder, or hand: Severe  Weakness in your arm, shoulder or hand: Moderate  Stiffness in your arm, shoulder or hand: Moderate  During the past week, how much difficulty have you had sleeping because of the pain in your arm, shoulder or hand?: Moderate Difficiculty  I feel less capable, less confident or less useful because of my arm, shoulder or hand problem: Neither Agree nor Disagree  DASH Sum : 94  Number of Questions Answered: 30  DASH Score: 53.33         Time Calculation:     Start Time: 1443  Stop Time: 1530  Time Calculation (min): 47 min  Total Timed Code Minutes- PT: 45 minute(s)     Therapy Charges for Today     Code Description Service Date Service Provider Modifiers Qty    50953183587 HC PT THER PROC EA 15 MIN 4/19/2019 Anila Alvarado, PT GP 1    23166356978 HC PT EVAL MOD COMPLEXITY 2 4/19/2019 Anila Alvarado, PT GP 1          PT G-Codes  Outcome Measure Options: Disabilities of the Arm, Shoulder, and Hand (DASH)          Anila Alvarado, PT  4/19/2019

## 2019-04-24 ENCOUNTER — HOSPITAL ENCOUNTER (OUTPATIENT)
Dept: PHYSICAL THERAPY | Facility: HOSPITAL | Age: 74
Setting detail: THERAPIES SERIES
Discharge: HOME | End: 2019-04-24

## 2019-04-24 DIAGNOSIS — M75.102 TEAR OF LEFT ROTATOR CUFF, UNSPECIFIED TEAR EXTENT: ICD-10-CM

## 2019-04-24 DIAGNOSIS — M25.512 LEFT SHOULDER PAIN, UNSPECIFIED CHRONICITY: Primary | ICD-10-CM

## 2019-04-24 PROCEDURE — 97110 THERAPEUTIC EXERCISES: CPT

## 2019-04-24 PROCEDURE — 97140 MANUAL THERAPY 1/> REGIONS: CPT

## 2019-04-24 NOTE — THERAPY TREATMENT NOTE
Outpatient Physical Therapy Ortho Treatment Note  [facility]     Patient Name: Jesus Izaguirre Jr.  : 1945  MRN: [id number]  Today's Date: 2019      Visit Date: 2019    Visit Dx:    ICD-10-CM ICD-9-CM   1. Left shoulder pain, unspecified chronicity M25.512 719.41   2. Tear of left rotator cuff, unspecified tear extent M75.102 840.4       Patient Active Problem List   Diagnosis   • Chronic ischemic right MCA stroke   • Acute bronchitis   • AB (asthmatic bronchitis)   • BPH (benign prostatic hyperplasia)   • Bradycardia   • Cough   • Dribbling from mouth   • Fatigue   • Febrile   • Gout   • Hyperlipidemia   • Hypertension   •  flu   • Obstructive sleep apnea syndrome   • Breath shortness   • Type 2 diabetes mellitus with diabetic polyneuropathy, without long-term current use of insulin (CMS/HCC)   • Vitamin B12 deficiency   • Vitamin D deficiency   • History of lithotripsy   • Calculus of kidney   • Onychia of finger   • Paronychia of finger   • Venous stasis   • Memory impairment   • Neuropathy   • Acute pain of right knee   • Atrial fibrillation (CMS/HCC)   • Tobacco abuse   • Class 2 obesity in adult        Past Medical History:   Diagnosis Date   • Asthma    • BPH (benign prostatic hypertrophy)    • Class 2 obesity in adult 2019   • Diabetes mellitus (CMS/HCC)    • Fatigue    • HLD (hyperlipidemia) 2016   • Hypertension    • Nephrolithiasis    • Stroke (CMS/HCC) 2015   • Tobacco abuse 3/24/2018   • Type 2 diabetes mellitus with diabetic polyneuropathy, without long-term current use of insulin (CMS/HCC)    • Venous insufficiency         Past Surgical History:   Procedure Laterality Date   • LITHOTRIPSY      Renal       PT Ortho     Row Name 19 1200       Subjective Comments    Subjective Comments  Exercises are easy...  -SI       Subjective Pain    Able to rate subjective pain?  yes  -SI    Subjective Pain Comment  Shoulder pain at night  -SI      User Key  (r) =  Recorded By, (t) = Taken By, (c) = Cosigned By    Initials Name Provider Type    Janice Rendon PTA Physical Therapy Assistant                      PT Assessment/Plan     Row Name 04/24/19 1224          PT Assessment    Assessment Comments  Ex progressed and tolerated well.  -SI       User Key  (r) = Recorded By, (t) = Taken By, (c) = Cosigned By    Initials Name Provider Type    Janice Rendon PTA Physical Therapy Assistant            Exercises     Row Name 04/24/19 1200 04/24/19 1100          Subjective Comments    Subjective Comments  Exercises are easy...  -SI  --        Subjective Pain    Able to rate subjective pain?  yes  -SI  --     Subjective Pain Comment  Shoulder pain at night  -SI  --        Total Minutes    63279 - PT Therapeutic Exercise Minutes  30  -SI  --     12661 - PT Manual Therapy Minutes  --  15  -SI        Exercise 1    Exercise Name 1  shoulder rolls and scap ret  -SI  --     Sets 1  1  -SI  --     Reps 1  10  -SI  --        Exercise 2    Exercise Name 2  AAROM ER w/cane  -SI  --     Sets 2  1  -SI  --     Reps 2  10  -SI  --     Time 2  10 sec  -SI  --        Exercise 3    Exercise Name 3  supine press ups with 2 lbs  -SI  --     Sets 3  1  -SI  --     Reps 3  10  -SI  --        Exercise 4    Exercise Name 4  table slides, thumb up  -SI  --     Sets 4  1  -SI  --     Reps 4  10  -SI  --     Time 4  5-10 sec  -SI  --        Exercise 5    Exercise Name 5  UBE  -SI  --     Time 5  4 min  -SI  --        Exercise 6    Exercise Name 6  supine 2 lbs, trace letters of alphabet with arm 90 degrees holding 2 lbs  -SI  --     Sets 6  1  -SI  --        Exercise 7    Exercise Name 7  scap retraction and sh ext with green TB  -SI  --     Sets 7  2  -SI  --     Reps 7  10  -SI  --       User Key  (r) = Recorded By, (t) = Taken By, (c) = Cosigned By    Initials Name Provider Type    Janice Rendon PTA Physical Therapy Assistant                      Manual Rx (last 36 hours)      Manual  Treatments     Row Name 04/24/19 1100             Total Minutes    47570 - PT Manual Therapy Minutes  15  -SI         Manual Rx 1    Manual Rx 1 Location  left shoulder  -SI      Manual Rx 1 Type  PROM  -SI      Manual Rx 1 Duration  15  -SI        User Key  (r) = Recorded By, (t) = Taken By, (c) = Cosigned By    Initials Name Provider Type    Janice Rendon PTA Physical Therapy Assistant              Therapy Education  Given: HEP, Symptoms/condition management  Program: Progressed  How Provided: Demonstration, Written  Provided to: Patient  Level of Understanding: Teach back education performed              Time Calculation:   Start Time: 1145  Stop Time: 1230  Time Calculation (min): 45 min  Total Timed Code Minutes- PT: 45 minute(s)  Therapy Charges for Today     Code Description Service Date Service Provider Modifiers Qty    60776142837 HC PT THER PROC EA 15 MIN 4/24/2019 Janice Ramsay PTA GP 2    74008345908 HC PT MANUAL THERAPY EA 15 MIN 4/24/2019 Janice Ramsay PTA GP 1                    Janice Ramsay PTA  4/24/2019

## 2019-04-26 ENCOUNTER — HOSPITAL ENCOUNTER (OUTPATIENT)
Dept: PHYSICAL THERAPY | Facility: HOSPITAL | Age: 74
Setting detail: THERAPIES SERIES
Discharge: HOME | End: 2019-04-26

## 2019-04-26 DIAGNOSIS — M75.102 TEAR OF LEFT ROTATOR CUFF, UNSPECIFIED TEAR EXTENT: ICD-10-CM

## 2019-04-26 DIAGNOSIS — M25.512 LEFT SHOULDER PAIN, UNSPECIFIED CHRONICITY: Primary | ICD-10-CM

## 2019-04-26 PROCEDURE — 97140 MANUAL THERAPY 1/> REGIONS: CPT

## 2019-04-26 PROCEDURE — 97110 THERAPEUTIC EXERCISES: CPT

## 2019-04-30 ENCOUNTER — HOSPITAL ENCOUNTER (OUTPATIENT)
Dept: PHYSICAL THERAPY | Facility: HOSPITAL | Age: 74
Discharge: HOME | End: 2019-04-30
Admitting: ORTHOPAEDIC SURGERY

## 2019-04-30 DIAGNOSIS — M75.102 TEAR OF LEFT ROTATOR CUFF, UNSPECIFIED TEAR EXTENT: ICD-10-CM

## 2019-04-30 DIAGNOSIS — M25.512 LEFT SHOULDER PAIN, UNSPECIFIED CHRONICITY: Primary | ICD-10-CM

## 2019-04-30 PROCEDURE — 97110 THERAPEUTIC EXERCISES: CPT

## 2019-04-30 PROCEDURE — 97140 MANUAL THERAPY 1/> REGIONS: CPT

## 2019-05-07 ENCOUNTER — HOSPITAL ENCOUNTER (OUTPATIENT)
Dept: PHYSICAL THERAPY | Facility: HOSPITAL | Age: 74
Setting detail: THERAPIES SERIES
Discharge: HOME | End: 2019-05-07

## 2019-05-07 DIAGNOSIS — M25.512 LEFT SHOULDER PAIN, UNSPECIFIED CHRONICITY: Primary | ICD-10-CM

## 2019-05-07 DIAGNOSIS — M75.102 TEAR OF LEFT ROTATOR CUFF, UNSPECIFIED TEAR EXTENT: ICD-10-CM

## 2019-05-07 PROCEDURE — 97140 MANUAL THERAPY 1/> REGIONS: CPT

## 2019-05-07 PROCEDURE — 97110 THERAPEUTIC EXERCISES: CPT

## 2019-05-07 NOTE — THERAPY TREATMENT NOTE
Outpatient Physical Therapy Ortho Treatment Note  [facility]     Patient Name: Jesus Izaguirre Jr.  : 1945  MRN: [id number]  Today's Date: 2019      Visit Date: 2019    Visit Dx:    ICD-10-CM ICD-9-CM   1. Left shoulder pain, unspecified chronicity M25.512 719.41   2. Tear of left rotator cuff, unspecified tear extent M75.102 840.4       Patient Active Problem List   Diagnosis   • Chronic ischemic right MCA stroke   • Acute bronchitis   • AB (asthmatic bronchitis)   • BPH (benign prostatic hyperplasia)   • Bradycardia   • Cough   • Dribbling from mouth   • Fatigue   • Febrile   • Gout   • Hyperlipidemia   • Hypertension   •  flu   • Obstructive sleep apnea syndrome   • Breath shortness   • Type 2 diabetes mellitus with diabetic polyneuropathy, without long-term current use of insulin (CMS/HCC)   • Vitamin B12 deficiency   • Vitamin D deficiency   • History of lithotripsy   • Calculus of kidney   • Onychia of finger   • Paronychia of finger   • Venous stasis   • Memory impairment   • Neuropathy   • Acute pain of right knee   • Atrial fibrillation (CMS/HCC)   • Tobacco abuse   • Class 2 obesity in adult        Past Medical History:   Diagnosis Date   • Asthma    • BPH (benign prostatic hypertrophy)    • Class 2 obesity in adult 2019   • Diabetes mellitus (CMS/HCC)    • Fatigue    • HLD (hyperlipidemia) 2016   • Hypertension    • Nephrolithiasis    • Stroke (CMS/HCC) 2015   • Tobacco abuse 3/24/2018   • Type 2 diabetes mellitus with diabetic polyneuropathy, without long-term current use of insulin (CMS/HCC)    • Venous insufficiency         Past Surgical History:   Procedure Laterality Date   • LITHOTRIPSY      Renal       PT Ortho     Row Name 19 1400       Subjective Comments    Subjective Comments  No shoulder pain and able to raise it higher.  -SI       Subjective Pain    Able to rate subjective pain?  yes  -SI    Subjective Pain Comment  No pain  -SI       Right Upper  Ext    Rt Shoulder Abduction AROM  140  -SI    Rt Shoulder Abduction PROM  160  -SI    Rt Shoulder Flexion AROM  130  -SI    Rt Shoulder Flexion PROM  160  -SI    Rt Shoulder External Rotation AROM  T1  -SI    Rt Shoulder External Rotation PROM  80  -SI    Rt Shoulder Internal Rotation AROM  T10  -SI    Rt Shoulder Internal Rotation PROM  75  -SI       Left Upper Ext    Lt Shoulder Abduction AROM  80  -SI    Lt Shoulder Abduction PROM  160  -SI    Lt Shoulder Flexion AROM  135 for 1-2 reps then 80  -SI    Lt Shoulder Flexion PROM  160  -SI    Lt Shoulder External Rotation AROM  T1  -SI    Lt Shoulder External Rotation PROM  80  -SI    Lt Shoulder Internal Rotation AROM  T10  -SI    Lt Shoulder Internal Rotation PROM  75  -SI       MMT Right Upper Ext    Rt Shoulder Flexion MMT, Gross Movement  (4-/5) good minus  -SI    Rt Shoulder ABduction MMT, Gross Movement  (4-/5) good minus  -SI    Rt Shoulder Internal Rotation MMT, Gross Movement  (4/5) good  -SI    Rt Shoulder External Rotation MMT, Gross Movement  (4/5) good  -SI       MMT Left Upper Ext    Lt Shoulder Flexion MMT, Gross Movement  (3/5) fair  -SI    Lt Shoulder ABduction MMT, Gross Movement  (3-/5) fair minus  -SI    Lt Shoulder Internal Rotation MMT, Gross Movement  (4-/5) good minus  -SI    Lt Shoulder External Rotation MMT, Gross Movement  (3/5) fair  -SI      User Key  (r) = Recorded By, (t) = Taken By, (c) = Cosigned By    Initials Name Provider Type    Janice Rendon, JOSIAH Physical Therapy Assistant                      PT Assessment/Plan     Row Name 05/07/19 5908          PT Assessment    Assessment Comments  Pt careful as his gait is a little unsteady with chronic knee pain.  Want to try to prevent another fall.  No pain any longer left shoulder.  Full PROM both shoulders.  Decrease strength left shoulder from RCT and fall.  Mild decrease strength right shoulder (chronic)  -SI       User Key  (r) = Recorded By, (t) = Taken By, (c) = Cosigned By     Initials Name Provider Type    Janice Rendon PTA Physical Therapy Assistant            Exercises     Row Name 05/07/19 1500 05/07/19 1400          Subjective Comments    Subjective Comments  --  No shoulder pain and able to raise it higher.  -SI        Subjective Pain    Able to rate subjective pain?  --  yes  -SI     Subjective Pain Comment  --  No pain  -SI        Total Minutes    70658 - PT Therapeutic Exercise Minutes  30  -SI  --     65764 - PT Manual Therapy Minutes  15  -SI  --        Exercise 1    Exercise Name 1  shoulder rolls and scap ret  -SI  --     Sets 1  1  -SI  --     Reps 1  10  -SI  --        Exercise 2    Exercise Name 2  AAROM ER w/cane  -SI  --     Sets 2  1  -SI  --     Reps 2  10  -SI  --     Time 2  10 sec  -SI  --        Exercise 3    Exercise Name 3  supine press ups   -SI  --     Sets 3  2  -SI  --     Reps 3  10  -SI  --     Additional Comments  2lbs  -SI  --        Exercise 4    Exercise Name 4     -SI  --        Exercise 5    Exercise Name 5  UBE  -SI  --     Time 5  4 min  -SI  --        Exercise 6    Exercise Name 6  supine 2 lbs, trace letters of alphabet with arm 90 degrees holding 2 lbs  -SI  --     Sets 6  1  -SI  --        Exercise 7    Exercise Name 7  scap retraction and sh ext with green TB  -SI  --     Sets 7  2  -SI  --     Reps 7  10  -SI  --        Exercise 8    Exercise Name 8  IR and ER with REd TB  -SI  --     Sets 8  2  -SI  --     Reps 8  10  -SI  --        Exercise 9    Exercise Name 9  wall slide with eccentric lower   -SI  --     Reps 9  5  -SI  --       User Key  (r) = Recorded By, (t) = Taken By, (c) = Cosigned By    Initials Name Provider Type    Janice Rendon PTA Physical Therapy Assistant                      Manual Rx (last 36 hours)      Manual Treatments     Row Name 05/07/19 1500             Total Minutes    87188 - PT Manual Therapy Minutes  15  -SI         Manual Rx 1    Manual Rx 1 Location  left shoulder  -SI      Manual Rx 1 Type  PROM   -SI      Manual Rx 1 Duration  15  -SI        User Key  (r) = Recorded By, (t) = Taken By, (c) = Cosigned By    Initials Name Provider Type    Janice Rendon PTA Physical Therapy Assistant              Therapy Education  Given: HEP, Symptoms/condition management, Fall prevention and home safety(Gait changes and fair balalnce..)  Program: Reinforced  How Provided: Verbal, Demonstration, Written  Provided to: Patient  Level of Understanding: Teach back education performed              Time Calculation:   Start Time: 1455  Stop Time: 1540  Time Calculation (min): 45 min  Total Timed Code Minutes- PT: 45 minute(s)  Therapy Charges for Today     Code Description Service Date Service Provider Modifiers Qty    26166895330 HC PT THER PROC EA 15 MIN 5/7/2019 Janice Ramsay PTA GP 2    38055367737 HC PT MANUAL THERAPY EA 15 MIN 5/7/2019 Janice Ramsay PTA GP 1                    Janice Ramsay PTA  5/7/2019

## 2019-05-15 ENCOUNTER — HOSPITAL ENCOUNTER (OUTPATIENT)
Dept: PHYSICAL THERAPY | Facility: HOSPITAL | Age: 74
Setting detail: THERAPIES SERIES
Discharge: HOME | End: 2019-05-15

## 2019-05-15 DIAGNOSIS — M75.102 TEAR OF LEFT ROTATOR CUFF, UNSPECIFIED TEAR EXTENT: ICD-10-CM

## 2019-05-15 DIAGNOSIS — M25.512 LEFT SHOULDER PAIN, UNSPECIFIED CHRONICITY: Primary | ICD-10-CM

## 2019-05-15 PROCEDURE — 97110 THERAPEUTIC EXERCISES: CPT

## 2019-05-15 PROCEDURE — 97140 MANUAL THERAPY 1/> REGIONS: CPT

## 2019-05-15 NOTE — THERAPY TREATMENT NOTE
Outpatient Physical Therapy Ortho Treatment Note  [facility]     Patient Name: Jesus Izaguirre Jr.  : 1945  MRN: [id number]  Today's Date: 5/15/2019      Visit Date: 05/15/2019    Visit Dx:    ICD-10-CM ICD-9-CM   1. Left shoulder pain, unspecified chronicity M25.512 719.41   2. Tear of left rotator cuff, unspecified tear extent M75.102 840.4       Patient Active Problem List   Diagnosis   • Chronic ischemic right MCA stroke   • Acute bronchitis   • AB (asthmatic bronchitis)   • BPH (benign prostatic hyperplasia)   • Bradycardia   • Cough   • Dribbling from mouth   • Fatigue   • Febrile   • Gout   • Hyperlipidemia   • Hypertension   •  flu   • Obstructive sleep apnea syndrome   • Breath shortness   • Type 2 diabetes mellitus with diabetic polyneuropathy, without long-term current use of insulin (CMS/HCC)   • Vitamin B12 deficiency   • Vitamin D deficiency   • History of lithotripsy   • Calculus of kidney   • Onychia of finger   • Paronychia of finger   • Venous stasis   • Memory impairment   • Neuropathy   • Acute pain of right knee   • Atrial fibrillation (CMS/HCC)   • Tobacco abuse   • Class 2 obesity in adult        Past Medical History:   Diagnosis Date   • Asthma    • BPH (benign prostatic hypertrophy)    • Class 2 obesity in adult 2019   • Diabetes mellitus (CMS/HCC)    • Fatigue    • HLD (hyperlipidemia) 2016   • Hypertension    • Nephrolithiasis    • Stroke (CMS/HCC) 2015   • Tobacco abuse 3/24/2018   • Type 2 diabetes mellitus with diabetic polyneuropathy, without long-term current use of insulin (CMS/HCC)    • Venous insufficiency         Past Surgical History:   Procedure Laterality Date   • LITHOTRIPSY      Renal       PT Ortho     Row Name 05/15/19 1400       Subjective Comments    Subjective Comments  No shoulder pain and feel can raise the arm higher  -SI       Subjective Pain    Able to rate subjective pain?  yes  -SI    Pre-Treatment Pain Level  0  -SI    Post-Treatment  Pain Level  0  -SI       Left Upper Ext    Lt Shoulder Abduction AROM  90  -SI    Lt Shoulder Abduction PROM  160  -SI    Lt Shoulder Flexion AROM  115  -SI    Lt Shoulder Flexion PROM  160  -SI    Lt Shoulder External Rotation AROM  t1  -SI    Lt Shoulder External Rotation PROM  80  -SI    Lt Shoulder Internal Rotation AROM  t10  -SI    Lt Shoulder Internal Rotation PROM  75  -SI       MMT Left Upper Ext    Lt Shoulder Flexion MMT, Gross Movement  (3/5) fair  -SI    Lt Shoulder ABduction MMT, Gross Movement  (3-/5) fair minus  -SI    Lt Shoulder Internal Rotation MMT, Gross Movement  (4-/5) good minus  -SI    Lt Shoulder External Rotation MMT, Gross Movement  (3/5) fair  -SI      User Key  (r) = Recorded By, (t) = Taken By, (c) = Cosigned By    Initials Name Provider Type    Janice Rendon PTA Physical Therapy Assistant                      PT Assessment/Plan     Row Name 05/15/19 1451          PT Assessment    Assessment Comments  Pt continues with weakness in the left shoulder but has no pain.   -SI       User Key  (r) = Recorded By, (t) = Taken By, (c) = Cosigned By    Initials Name Provider Type    Janice Rendon PTA Physical Therapy Assistant            Exercises     Row Name 05/15/19 1400 05/15/19 1300          Subjective Comments    Subjective Comments  No shoulder pain and feel can raise the arm higher  -SI  --        Subjective Pain    Able to rate subjective pain?  yes  -SI  --     Pre-Treatment Pain Level  0  -SI  --     Post-Treatment Pain Level  0  -SI  --        Total Minutes    03040 - PT Therapeutic Exercise Minutes  30  -SI  --     91125 - PT Manual Therapy Minutes  --  15  -SI        Exercise 1    Exercise Name 1  shoulder rolls and scap ret  -SI  --     Sets 1  1  -SI  --     Reps 1  10  -SI  --        Exercise 2    Exercise Name 2  AAROM ER w/cane  -SI  --     Sets 2  1  -SI  --     Reps 2  10  -SI  --     Time 2  10 sec  -SI  --        Exercise 3    Exercise Name 3  supine press ups    -SI  --     Sets 3  2  -SI  --     Reps 3  10  -SI  --     Additional Comments  2lbs  -SI  --        Exercise 4    Exercise Name 4  Standing bilat sh flex with cane, and scaption with cane  -SI  --     Sets 4  2  -SI  --     Reps 4  10  -SI  --        Exercise 5    Exercise Name 5  UBE  -SI  --     Time 5  4 min  -SI  --        Exercise 6    Exercise Name 6  supine 2 lbs, trace letters of alphabet with arm 90 degrees holding 2 lbs  -SI  --     Sets 6  1  -SI  --        Exercise 7    Exercise Name 7  scap retraction and sh ext with green TB  -SI  --     Sets 7  2  -SI  --     Reps 7  10  -SI  --        Exercise 8    Exercise Name 8  IR and ER with REd TB  -SI  --     Sets 8  2  -SI  --     Reps 8  10  -SI  --        Exercise 9    Exercise Name 9  wall slide with eccentric lower   -SI  --     Reps 9  5  -SI  --       User Key  (r) = Recorded By, (t) = Taken By, (c) = Cosigned By    Initials Name Provider Type    Janice Rendon PTA Physical Therapy Assistant                      Manual Rx (last 36 hours)      Manual Treatments     Row Name 05/15/19 1300             Total Minutes    86901 - PT Manual Therapy Minutes  15  -SI         Manual Rx 1    Manual Rx 1 Location  left shoulder  -SI      Manual Rx 1 Type  PROM  -SI      Manual Rx 1 Duration  15  -SI        User Key  (r) = Recorded By, (t) = Taken By, (c) = Cosigned By    Initials Name Provider Type    Janice Rendon PTA Physical Therapy Assistant              Therapy Education  Given: HEP, Symptoms/condition management  Program: Progressed  How Provided: Demonstration, Written, Verbal  Provided to: Patient  Level of Understanding: Teach back education performed              Time Calculation:   Start Time: 1410  Stop Time: 1455  Time Calculation (min): 45 min  Total Timed Code Minutes- PT: 45 minute(s)  Therapy Charges for Today     Code Description Service Date Service Provider Modifiers Qty    22618470466  PT THER PROC EA 15 MIN 5/15/2019 Janice Ramsay  ARIELLE, PTA GP 2    17472919567 HC PT MANUAL THERAPY EA 15 MIN 5/15/2019 Janice Ramsay, PTA GP 1                    Janice Ramsay PTA  5/15/2019

## 2019-05-17 ENCOUNTER — APPOINTMENT (OUTPATIENT)
Dept: PHYSICAL THERAPY | Facility: HOSPITAL | Age: 74
End: 2019-05-17

## 2019-05-21 ENCOUNTER — APPOINTMENT (OUTPATIENT)
Dept: PHYSICAL THERAPY | Facility: HOSPITAL | Age: 74
End: 2019-05-21

## 2019-05-22 ENCOUNTER — APPOINTMENT (OUTPATIENT)
Dept: PHYSICAL THERAPY | Facility: HOSPITAL | Age: 74
End: 2019-05-22

## 2019-05-23 ENCOUNTER — HOSPITAL ENCOUNTER (OUTPATIENT)
Dept: PHYSICAL THERAPY | Facility: HOSPITAL | Age: 74
Setting detail: THERAPIES SERIES
Discharge: HOME | End: 2019-05-23

## 2019-05-23 ENCOUNTER — APPOINTMENT (OUTPATIENT)
Dept: PHYSICAL THERAPY | Facility: HOSPITAL | Age: 74
End: 2019-05-23

## 2019-05-23 DIAGNOSIS — M25.512 LEFT SHOULDER PAIN, UNSPECIFIED CHRONICITY: Primary | ICD-10-CM

## 2019-05-23 DIAGNOSIS — M75.102 TEAR OF LEFT ROTATOR CUFF, UNSPECIFIED TEAR EXTENT: ICD-10-CM

## 2019-05-23 PROCEDURE — 97110 THERAPEUTIC EXERCISES: CPT

## 2019-05-23 PROCEDURE — 97140 MANUAL THERAPY 1/> REGIONS: CPT

## 2019-05-23 NOTE — THERAPY DISCHARGE NOTE
Outpatient Physical Therapy Ortho Treatment Note/Discharge Summary  [facility]     Patient Name: Jesus Izaguirre Jr.  : 1945  MRN: [id number]  Today's Date: 2019      Visit Date: 2019    Visit Dx:    ICD-10-CM ICD-9-CM   1. Left shoulder pain, unspecified chronicity M25.512 719.41   2. Tear of left rotator cuff, unspecified tear extent M75.102 840.4       Patient Active Problem List   Diagnosis   • Chronic ischemic right MCA stroke   • Acute bronchitis   • AB (asthmatic bronchitis)   • BPH (benign prostatic hyperplasia)   • Bradycardia   • Cough   • Dribbling from mouth   • Fatigue   • Febrile   • Gout   • Hyperlipidemia   • Hypertension   •  flu   • Obstructive sleep apnea syndrome   • Breath shortness   • Type 2 diabetes mellitus with diabetic polyneuropathy, without long-term current use of insulin (CMS/HCC)   • Vitamin B12 deficiency   • Vitamin D deficiency   • History of lithotripsy   • Calculus of kidney   • Onychia of finger   • Paronychia of finger   • Venous stasis   • Memory impairment   • Neuropathy   • Acute pain of right knee   • Atrial fibrillation (CMS/HCC)   • Tobacco abuse   • Class 2 obesity in adult        Past Medical History:   Diagnosis Date   • Asthma    • BPH (benign prostatic hypertrophy)    • Class 2 obesity in adult 2019   • Diabetes mellitus (CMS/HCC)    • Fatigue    • HLD (hyperlipidemia) 2016   • Hypertension    • Nephrolithiasis    • Stroke (CMS/HCC) 2015   • Tobacco abuse 3/24/2018   • Type 2 diabetes mellitus with diabetic polyneuropathy, without long-term current use of insulin (CMS/HCC)    • Venous insufficiency         Past Surgical History:   Procedure Laterality Date   • LITHOTRIPSY      Renal       PT Ortho     Row Name 19 1300       Subjective Comments    Subjective Comments  No sh pain to report  -SI       Subjective Pain    Able to rate subjective pain?  yes  -SI    Pre-Treatment Pain Level  0  -SI    Post-Treatment Pain Level   0  -SI       Right Upper Ext    Rt Shoulder Abduction AROM  150  -SI    Rt Shoulder Abduction PROM  170  -SI    Rt Shoulder Flexion AROM  150  -SI    Rt Shoulder Flexion PROM  170  -SI    Rt Shoulder External Rotation AROM  T1  -SI    Rt Shoulder External Rotation PROM  85  -SI    Rt Shoulder Internal Rotation AROM  T10  -SI    Rt Shoulder Internal Rotation PROM  80  -SI       Left Upper Ext    Lt Shoulder Abduction AROM  125  -SI    Lt Shoulder Abduction PROM  150  -SI    Lt Shoulder Flexion AROM  145  -SI    Lt Shoulder Flexion PROM  160  -SI    Lt Shoulder External Rotation AROM  T1  -SI    Lt Shoulder External Rotation PROM  80  -SI    Lt Shoulder Internal Rotation AROM  T10  -SI    Lt Shoulder Internal Rotation PROM  80  -SI       MMT Right Upper Ext    Rt Shoulder Flexion MMT, Gross Movement  (4/5) good  -SI    Rt Shoulder ABduction MMT, Gross Movement  (4/5) good  -SI    Rt Shoulder Internal Rotation MMT, Gross Movement  (4/5) good  -SI    Rt Shoulder External Rotation MMT, Gross Movement  (4/5) good  -SI       MMT Left Upper Ext    Lt Shoulder Flexion MMT, Gross Movement  (3+/5) fair plus  -SI    Lt Shoulder ABduction MMT, Gross Movement  (3/5) fair  -SI    Lt Shoulder Internal Rotation MMT, Gross Movement  (4/5) good  -SI    Lt Shoulder External Rotation MMT, Gross Movement  (3+/5) fair plus  -SI      User Key  (r) = Recorded By, (t) = Taken By, (c) = Cosigned By    Initials Name Provider Type    Janice Rendon PTA Physical Therapy Assistant                      PT Assessment/Plan     Row Name 05/23/19 6174          PT Assessment    Assessment Comments  Pt pleaesed with his progress and states will keep working on the ex at home.  He has no pain.  PROM good .  Weakness but some improvement noted.  DC on HEP to continue every other day  -SI       User Key  (r) = Recorded By, (t) = Taken By, (c) = Cosigned By    Initials Name Provider Type    Janice Rendon PTA Physical Therapy Assistant               Exercises     Row Name 05/23/19 1300             Subjective Comments    Subjective Comments  No sh pain to report  -SI         Subjective Pain    Able to rate subjective pain?  yes  -SI      Pre-Treatment Pain Level  0  -SI      Post-Treatment Pain Level  0  -SI         Total Minutes    92286 - PT Therapeutic Exercise Minutes  30  -SI      68596 - PT Manual Therapy Minutes  15  -SI         Exercise 1    Exercise Name 1  shoulder rolls and scap ret  -SI      Sets 1  1  -SI      Reps 1  10  -SI         Exercise 2    Exercise Name 2  AAROM ER w/cane  -SI      Sets 2  1  -SI      Reps 2  10  -SI      Time 2  10 sec  -SI         Exercise 3    Exercise Name 3  supine press ups   -SI      Sets 3  2  -SI      Reps 3  10  -SI      Additional Comments  2lbs  -SI         Exercise 4    Exercise Name 4  Standing bilat sh flex with cane, and scaption with cane  -SI      Sets 4  2  -SI      Reps 4  10  -SI         Exercise 5    Exercise Name 5  UBE  -SI      Time 5  4 min  -SI         Exercise 6    Exercise Name 6  supine 2 lbs, trace letters of alphabet with arm 90 degrees holding 2 lbs  -SI      Sets 6  1  -SI         Exercise 7    Exercise Name 7  scap retraction and sh ext with green TB  -SI      Sets 7  2  -SI      Reps 7  10  -SI         Exercise 8    Exercise Name 8  IR and ER with REd TB  -SI      Sets 8  2  -SI      Reps 8  10  -SI         Exercise 9    Exercise Name 9  wall slide with eccentric lower   -SI      Reps 9  5  -SI        User Key  (r) = Recorded By, (t) = Taken By, (c) = Cosigned By    Initials Name Provider Type    SI Janice Ramsay, PTA Physical Therapy Assistant                        Manual Rx (last 36 hours)      Manual Treatments     Row Name 05/23/19 1300             Total Minutes    61357 - PT Manual Therapy Minutes  15  -SI         Manual Rx 1    Manual Rx 1 Location  left shoulder  -SI      Manual Rx 1 Type  PROM  -SI      Manual Rx 1 Duration  15  -SI        User Key  (r) = Recorded By,  (t) = Taken By, (c) = Cosigned By    Initials Name Provider Type    Janice Rnedon PTA Physical Therapy Assistant          PT OP Goals     Row Name 05/23/19 1400          PT Short Term Goals    STG 1  Patient will be independent with education for symptom management, joint protection and strategies to minimize stress on affected tissues  -SI     STG 1 Progress  Met  -SI     STG 2  Pt to improve L shoulder PROM in flex=150 deg, abd=150 deg, ER=75 deg  -SI     STG 2 Progress  Met  -SI     STG 3  Pt to improve pain complaints to no more than 4/10 with ADLs  -SI     STG 3 Progress  Met  -SI        Long Term Goals    LTG 1  Pt to improve L shoulder AROM in flex=100 deg, abd=90 deg, ER=T1 and IR=L1  -SI     LTG 1 Progress  Met  -SI     LTG 2  Pt to improve shoulder strength to 3 to 3+/5  -SI     LTG 2 Progress  Met  -SI     LTG 3  Pt to improve DASH score from 53% to 43% for overall functional improvement  -SI     LTG 3 Progress  Met  -SI     LTG 4  Patient will be independent and compliant with advanced home exercise program to facilitate self-management of symptoms.  -SI     LTG 4 Progress  Met  -SI       User Key  (r) = Recorded By, (t) = Taken By, (c) = Cosigned By    Initials Name Provider Type    Janice Rendon PTA Physical Therapy Assistant          Therapy Education  Given: HEP, Symptoms/condition management, Fall prevention and home safety  Program: Reinforced  How Provided: Verbal, Demonstration, Written  Provided to: Patient  Level of Understanding: Teach back education performed    Outcome Measure Options: Disabilities of the Arm, Shoulder, and Hand (DASH)(6.6%)         Time Calculation:   Start Time: 1330  Stop Time: 1415  Time Calculation (min): 45 min  Total Timed Code Minutes- PT: 45 minute(s)  Therapy Charges for Today     Code Description Service Date Service Provider Modifiers Qty    94082812126 HC PT THER PROC EA 15 MIN 5/23/2019 Janice Ramsay PTA GP 2    13074723063 HC PT MANUAL THERAPY  EA 15 MIN 5/23/2019 Janice Ramsay, PTA GP 1          PT G-Codes  Outcome Measure Options: Disabilities of the Arm, Shoulder, and Hand (DASH)(6.6%)     OP PT Discharge Summary  Date of Discharge: 05/23/19  Reason for Discharge: Independent  Outcomes Achieved: Able to achieve all goals within established timeline  Discharge Destination: Home with home program  Discharge Instructions/Additional Comments: HEP every other day.  fAll prevention emphasized as pt observed to be a little unstesdy at times.      Janice Ramsay, JOSIAH  5/23/2019

## 2019-05-29 ENCOUNTER — OFFICE VISIT (OUTPATIENT)
Dept: ORTHOPEDIC SURGERY | Facility: CLINIC | Age: 74
End: 2019-05-29

## 2019-05-29 VITALS — TEMPERATURE: 97 F | HEIGHT: 67 IN | WEIGHT: 272.4 LBS | BODY MASS INDEX: 42.75 KG/M2

## 2019-05-29 DIAGNOSIS — M75.100 TEAR OF ROTATOR CUFF, UNSPECIFIED LATERALITY, UNSPECIFIED TEAR EXTENT: Primary | ICD-10-CM

## 2019-05-29 PROCEDURE — 99212 OFFICE O/P EST SF 10 MIN: CPT | Performed by: ORTHOPAEDIC SURGERY

## 2019-05-30 NOTE — PROGRESS NOTES
Chief Complaint: Follow-up regarding left shoulder injury    HPI:  Mr. Izaguirre reports that the injection helped tremendously.  Pain is much better.  He says that his motion is back to normal.  He still has some weakness but things seem to be steadily improving.  Denies any complaints or issues today.    Exam: Left shoulder is briefly examined.  Skin is benign.  No focal areas of tenderness.  Full active motion.  5- out of 5 strength with elevation in the scapular plane.  Mild discomfort but no significant pain on exam.    Imaging: None    Assessment: Left shoulder injury, suspected rotator cuff tear    Plan: He seems to be doing fine with conservative treatment.  We are going to continue to manage this expectantly.  He will follow-up with me as needed.    Jeffy Conrad MD  05/29/2019

## 2019-08-20 ENCOUNTER — OFFICE VISIT (OUTPATIENT)
Dept: FAMILY MEDICINE CLINIC | Facility: CLINIC | Age: 74
End: 2019-08-20

## 2019-08-20 VITALS
WEIGHT: 275 LBS | DIASTOLIC BLOOD PRESSURE: 90 MMHG | SYSTOLIC BLOOD PRESSURE: 132 MMHG | HEIGHT: 67 IN | HEART RATE: 71 BPM | TEMPERATURE: 98.3 F | BODY MASS INDEX: 43.16 KG/M2 | OXYGEN SATURATION: 96 %

## 2019-08-20 DIAGNOSIS — E78.2 MIXED HYPERLIPIDEMIA: Primary | ICD-10-CM

## 2019-08-20 DIAGNOSIS — E11.42 TYPE 2 DIABETES MELLITUS WITH DIABETIC POLYNEUROPATHY, WITHOUT LONG-TERM CURRENT USE OF INSULIN (HCC): ICD-10-CM

## 2019-08-20 DIAGNOSIS — E53.8 VITAMIN B12 DEFICIENCY: ICD-10-CM

## 2019-08-20 DIAGNOSIS — E55.9 VITAMIN D DEFICIENCY: ICD-10-CM

## 2019-08-20 DIAGNOSIS — T14.8XXA WOUND OF SKIN: ICD-10-CM

## 2019-08-20 DIAGNOSIS — N40.1 BENIGN PROSTATIC HYPERPLASIA WITH LOWER URINARY TRACT SYMPTOMS, SYMPTOM DETAILS UNSPECIFIED: ICD-10-CM

## 2019-08-20 PROCEDURE — 99214 OFFICE O/P EST MOD 30 MIN: CPT | Performed by: FAMILY MEDICINE

## 2019-08-20 RX ORDER — CEPHALEXIN 500 MG/1
500 CAPSULE ORAL 2 TIMES DAILY
Qty: 28 CAPSULE | Refills: 0 | Status: SHIPPED | OUTPATIENT
Start: 2019-08-20 | End: 2019-09-03

## 2019-08-20 NOTE — PROGRESS NOTES
Jesus Izaguirre Jr. is a 74 y.o. male.     Chief Complaint   Patient presents with   • Diabetes   • Hypertension   • Sleeping Problem       HPI     Patient presents to the office today after not having been seen in quite some time.  Patient has a history of hyperlipidemia, vitamin B12 deficiency, vitamin D deficiency, diabetes, BPH.  Currently taking amlodipine/benazepril, Eliquis, aspirin, atorvastatin, furosemide, propafenone, Janumet.  Patient admits to not taking them as prescribed quite infrequently.  Patient is also gone through a traumatic experience losing his wife earlier this year to a long-standing disease.  Patient is also developed 2 large ulcers on his bilateral lower extremities.  These have been there for roughly 6 weeks to 2 months.  No fever or chills.    The following portions of the patient's history were reviewed and updated as appropriate: allergies, current medications, past family history, past medical history, past social history, past surgical history and problem list.    Review of Systems   Musculoskeletal: Positive for joint swelling.   Psychiatric/Behavioral: Positive for sleep disturbance.   All other systems reviewed and are negative.    Reviewed and agree with documentation.    Objective  Vitals:    08/20/19 1412   BP: 132/90   Pulse: 71   Temp: 98.3 °F (36.8 °C)   SpO2: 96%       Physical Exam   Constitutional: He is oriented to person, place, and time. He appears well-developed and well-nourished. No distress.   HENT:   Head: Normocephalic and atraumatic.   Right Ear: External ear normal.   Left Ear: External ear normal.   Nose: Nose normal.   Mouth/Throat: Oropharynx is clear and moist.   Eyes: Conjunctivae and EOM are normal. Pupils are equal, round, and reactive to light. Right eye exhibits no discharge. Left eye exhibits no discharge. No scleral icterus.   Cardiovascular: Normal rate, regular rhythm and normal heart sounds.   Pulmonary/Chest: Effort normal and breath sounds  normal. No respiratory distress. He has no wheezes. He has no rales.   Abdominal: Soft. Bowel sounds are normal. He exhibits no distension. There is no tenderness.   Musculoskeletal:   Bilateral lower extremities are edematous and erythematous.  Large ulcers that are actively draining clear liquid are noted.   Neurological: He is alert and oriented to person, place, and time.   Skin: Skin is warm and dry. He is not diaphoretic.   Nursing note and vitals reviewed.        Current Outpatient Medications:   •  amLODIPine-benazepril (LOTREL) 10-40 MG per capsule, Take 1 capsule by mouth Daily., Disp: 30 capsule, Rfl: 11  •  apixaban (ELIQUIS) 5 MG tablet tablet, Take 5 mg by mouth., Disp: , Rfl:   •  aspirin 325 MG tablet, Take 1 tablet by mouth Daily., Disp: 90 tablet, Rfl: 3  •  atorvastatin (LIPITOR) 80 MG tablet, Take 1 tablet by mouth every night at bedtime., Disp: 90 tablet, Rfl: 1  •  furosemide (LASIX) 40 MG tablet, TAKE ONE TABLET BY MOUTH DAILY, Disp: 90 tablet, Rfl: 1  •  glucose blood test strip, Amber Contour Next Test In Vitro Strip; Patient Sig: Amber Contour Next Test In Vitro Strip TEST ONCE DAILY; 50; 3; 04-Mar-2015; Active, Disp: 100 each, Rfl: 3  •  HYDROcodone-acetaminophen (NORCO) 7.5-325 MG per tablet, , Disp: , Rfl:   •  propafenone (RYTHMOL) 150 MG tablet, Take 1 tablet by mouth 2 (Two) Times a Day., Disp: 60 tablet, Rfl: 6  •  SITagliptin-MetFORMIN HCl ER (JANUMET XR)  MG tablet, Take 2 tablets by mouth Daily., Disp: 180 tablet, Rfl: 3  •  sulfamethoxazole-trimethoprim (BACTRIM DS,SEPTRA DS) 800-160 MG per tablet, , Disp: , Rfl:   •  vitamin B-12 (CYANOCOBALAMIN) 1000 MCG tablet, Take  by mouth., Disp: , Rfl:   •  vitamin D (CHOLECALCIFEROL) 400 UNITS tablet, Take 400 Units by mouth daily., Disp: , Rfl:   •  cephalexin (KEFLEX) 500 MG capsule, Take 1 capsule by mouth 2 (Two) Times a Day for 14 days., Disp: 28 capsule, Rfl: 0  Current outpatient and discharge medications have been  reconciled for the patient.  Reviewed by: Shailesh Souza MD      Procedures    Lab Results (most recent)     None                  Jesus was seen today for diabetes, hypertension and sleeping problem.    Diagnoses and all orders for this visit:    Mixed hyperlipidemia  -     Comprehensive Metabolic Panel  -     Hemoglobin A1c  -     Lipid Panel  -     CBC Auto Differential  -     Vitamin B12  -     Vitamin D 25 Hydroxy  -     PSA DIAGNOSTIC    Vitamin B12 deficiency  -     Comprehensive Metabolic Panel  -     Hemoglobin A1c  -     Lipid Panel  -     CBC Auto Differential  -     Vitamin B12  -     Vitamin D 25 Hydroxy  -     PSA DIAGNOSTIC    Vitamin D deficiency  -     Comprehensive Metabolic Panel  -     Hemoglobin A1c  -     Lipid Panel  -     CBC Auto Differential  -     Vitamin B12  -     Vitamin D 25 Hydroxy  -     PSA DIAGNOSTIC    Type 2 diabetes mellitus with diabetic polyneuropathy, without long-term current use of insulin (CMS/[facility])  -     Comprehensive Metabolic Panel  -     Hemoglobin A1c  -     Lipid Panel  -     CBC Auto Differential  -     Vitamin B12  -     Vitamin D 25 Hydroxy  -     PSA DIAGNOSTIC    Benign prostatic hyperplasia with lower urinary tract symptoms, symptom details unspecified  -     Comprehensive Metabolic Panel  -     Hemoglobin A1c  -     Lipid Panel  -     CBC Auto Differential  -     Vitamin B12  -     Vitamin D 25 Hydroxy  -     PSA DIAGNOSTIC    Wound of skin  -     Ambulatory Referral to Wound Clinic  -     cephalexin (KEFLEX) 500 MG capsule; Take 1 capsule by mouth 2 (Two) Times a Day for 14 days.      Labs as above.  Very concerned about the patient's legs.  Will refer to wound clinic.  Will start Keflex.  We will have patient return in 2 weeks to go through lab work and come up with a treatment plan.  Discussed concerning signs and symptoms and reasons for an ER visit.    Return in about 2 weeks (around 9/3/2019) for Recheck.      Shailesh Souza MD

## 2019-08-21 LAB
25(OH)D3+25(OH)D2 SERPL-MCNC: 22.2 NG/ML (ref 30–100)
ALBUMIN SERPL-MCNC: 4.3 G/DL (ref 3.5–5.2)
ALBUMIN/GLOB SERPL: 1.7 G/DL
ALP SERPL-CCNC: 110 U/L (ref 39–117)
ALT SERPL-CCNC: 14 U/L (ref 1–41)
AST SERPL-CCNC: 14 U/L (ref 1–40)
BASOPHILS # BLD AUTO: 0.06 10*3/MM3 (ref 0–0.2)
BASOPHILS NFR BLD AUTO: 0.6 % (ref 0–1.5)
BILIRUB SERPL-MCNC: 0.5 MG/DL (ref 0.2–1.2)
BUN SERPL-MCNC: 16 MG/DL (ref 8–23)
BUN/CREAT SERPL: 13.3 (ref 7–25)
CALCIUM SERPL-MCNC: 9.9 MG/DL (ref 8.6–10.5)
CHLORIDE SERPL-SCNC: 96 MMOL/L (ref 98–107)
CHOLEST SERPL-MCNC: 111 MG/DL (ref 0–200)
CO2 SERPL-SCNC: 25.4 MMOL/L (ref 22–29)
CREAT SERPL-MCNC: 1.2 MG/DL (ref 0.76–1.27)
EOSINOPHIL # BLD AUTO: 0.11 10*3/MM3 (ref 0–0.4)
EOSINOPHIL NFR BLD AUTO: 1.2 % (ref 0.3–6.2)
ERYTHROCYTE [DISTWIDTH] IN BLOOD BY AUTOMATED COUNT: 13.2 % (ref 12.3–15.4)
GLOBULIN SER CALC-MCNC: 2.5 GM/DL
GLUCOSE SERPL-MCNC: 149 MG/DL (ref 65–99)
HBA1C MFR BLD: 8.7 % (ref 4.8–5.6)
HCT VFR BLD AUTO: 48.7 % (ref 37.5–51)
HDLC SERPL-MCNC: 50 MG/DL (ref 40–60)
HGB BLD-MCNC: 14.9 G/DL (ref 13–17.7)
IMM GRANULOCYTES # BLD AUTO: 0.07 10*3/MM3 (ref 0–0.05)
IMM GRANULOCYTES NFR BLD AUTO: 0.7 % (ref 0–0.5)
LDLC SERPL CALC-MCNC: 49 MG/DL (ref 0–100)
LYMPHOCYTES # BLD AUTO: 1.59 10*3/MM3 (ref 0.7–3.1)
LYMPHOCYTES NFR BLD AUTO: 16.8 % (ref 19.6–45.3)
MCH RBC QN AUTO: 27.8 PG (ref 26.6–33)
MCHC RBC AUTO-ENTMCNC: 30.6 G/DL (ref 31.5–35.7)
MCV RBC AUTO: 90.9 FL (ref 79–97)
MONOCYTES # BLD AUTO: 0.87 10*3/MM3 (ref 0.1–0.9)
MONOCYTES NFR BLD AUTO: 9.2 % (ref 5–12)
NEUTROPHILS # BLD AUTO: 6.75 10*3/MM3 (ref 1.7–7)
NEUTROPHILS NFR BLD AUTO: 71.5 % (ref 42.7–76)
NRBC BLD AUTO-RTO: 0.1 /100 WBC (ref 0–0.2)
PLATELET # BLD AUTO: 297 10*3/MM3 (ref 140–450)
POTASSIUM SERPL-SCNC: 5.1 MMOL/L (ref 3.5–5.2)
PROT SERPL-MCNC: 6.8 G/DL (ref 6–8.5)
PSA SERPL-MCNC: 2.95 NG/ML (ref 0–4)
RBC # BLD AUTO: 5.36 10*6/MM3 (ref 4.14–5.8)
SODIUM SERPL-SCNC: 136 MMOL/L (ref 136–145)
TRIGL SERPL-MCNC: 60 MG/DL (ref 0–150)
VIT B12 SERPL-MCNC: 494 PG/ML (ref 211–946)
VLDLC SERPL CALC-MCNC: 12 MG/DL (ref 5–40)
WBC # BLD AUTO: 9.45 10*3/MM3 (ref 3.4–10.8)

## 2019-09-03 ENCOUNTER — OFFICE VISIT (OUTPATIENT)
Dept: FAMILY MEDICINE CLINIC | Facility: CLINIC | Age: 74
End: 2019-09-03

## 2019-09-03 VITALS
DIASTOLIC BLOOD PRESSURE: 72 MMHG | OXYGEN SATURATION: 93 % | BODY MASS INDEX: 43.47 KG/M2 | SYSTOLIC BLOOD PRESSURE: 118 MMHG | TEMPERATURE: 97.6 F | HEART RATE: 76 BPM | RESPIRATION RATE: 17 BRPM | WEIGHT: 277 LBS | HEIGHT: 67 IN

## 2019-09-03 DIAGNOSIS — E11.42 TYPE 2 DIABETES MELLITUS WITH DIABETIC POLYNEUROPATHY, WITHOUT LONG-TERM CURRENT USE OF INSULIN (HCC): Primary | ICD-10-CM

## 2019-09-03 DIAGNOSIS — T14.8XXA MULTIPLE WOUNDS OF SKIN: ICD-10-CM

## 2019-09-03 DIAGNOSIS — G62.9 NEUROPATHY: ICD-10-CM

## 2019-09-03 DIAGNOSIS — R60.0 BILATERAL LOWER EXTREMITY EDEMA: ICD-10-CM

## 2019-09-03 DIAGNOSIS — I10 ESSENTIAL HYPERTENSION: ICD-10-CM

## 2019-09-03 PROCEDURE — 99214 OFFICE O/P EST MOD 30 MIN: CPT | Performed by: FAMILY MEDICINE

## 2019-09-03 RX ORDER — OMEGA-3-ACID ETHYL ESTERS 1 G/1
CAPSULE, LIQUID FILLED ORAL
COMMUNITY
Start: 2019-09-01 | End: 2020-11-13

## 2019-09-03 RX ORDER — FUROSEMIDE 40 MG/1
80 TABLET ORAL DAILY
Qty: 180 TABLET | Refills: 0 | Status: ON HOLD | OUTPATIENT
Start: 2019-09-03 | End: 2022-01-01

## 2019-09-09 ENCOUNTER — OFFICE VISIT (OUTPATIENT)
Dept: WOUND CARE | Facility: HOSPITAL | Age: 74
End: 2019-09-09

## 2019-09-09 ENCOUNTER — LAB REQUISITION (OUTPATIENT)
Dept: LAB | Facility: HOSPITAL | Age: 74
End: 2019-09-09

## 2019-09-09 DIAGNOSIS — Z00.00 ENCOUNTER FOR GENERAL ADULT MEDICAL EXAMINATION WITHOUT ABNORMAL FINDINGS: ICD-10-CM

## 2019-09-09 PROCEDURE — 87075 CULTR BACTERIA EXCEPT BLOOD: CPT | Performed by: NURSE PRACTITIONER

## 2019-09-09 PROCEDURE — 87205 SMEAR GRAM STAIN: CPT | Performed by: NURSE PRACTITIONER

## 2019-09-09 PROCEDURE — 87147 CULTURE TYPE IMMUNOLOGIC: CPT | Performed by: NURSE PRACTITIONER

## 2019-09-09 PROCEDURE — 87186 SC STD MICRODIL/AGAR DIL: CPT | Performed by: NURSE PRACTITIONER

## 2019-09-09 PROCEDURE — 87070 CULTURE OTHR SPECIMN AEROBIC: CPT | Performed by: NURSE PRACTITIONER

## 2019-09-09 PROCEDURE — G0463 HOSPITAL OUTPT CLINIC VISIT: HCPCS

## 2019-09-11 ENCOUNTER — TRANSCRIBE ORDERS (OUTPATIENT)
Dept: ADMINISTRATIVE | Facility: HOSPITAL | Age: 74
End: 2019-09-11

## 2019-09-11 DIAGNOSIS — L97.819: ICD-10-CM

## 2019-09-11 DIAGNOSIS — I70.203 BILATERAL FEMORAL ARTERY STENOSIS (HCC): Primary | ICD-10-CM

## 2019-09-11 DIAGNOSIS — I83.218: ICD-10-CM

## 2019-09-13 LAB
BACTERIA SPEC AEROBE CULT: ABNORMAL
BACTERIA SPEC AEROBE CULT: ABNORMAL
GRAM STN SPEC: ABNORMAL
GRAM STN SPEC: ABNORMAL

## 2019-09-14 LAB — BACTERIA SPEC ANAEROBE CULT: NORMAL

## 2019-09-16 ENCOUNTER — HOSPITAL ENCOUNTER (OUTPATIENT)
Dept: CARDIOLOGY | Facility: HOSPITAL | Age: 74
Discharge: HOME | End: 2019-09-16
Admitting: NURSE PRACTITIONER

## 2019-09-16 ENCOUNTER — HOSPITAL ENCOUNTER (OUTPATIENT)
Dept: CARDIOLOGY | Facility: HOSPITAL | Age: 74
End: 2019-09-16

## 2019-09-16 DIAGNOSIS — L97.911 ULCERS OF BOTH LOWER EXTREMITIES, LIMITED TO BREAKDOWN OF SKIN (HCC): ICD-10-CM

## 2019-09-16 DIAGNOSIS — L97.921 ULCERS OF BOTH LOWER EXTREMITIES, LIMITED TO BREAKDOWN OF SKIN (HCC): ICD-10-CM

## 2019-09-16 LAB
BH CV LOW VAS LEFT GREATER SAPH BK VESSEL: 1
BH CV LOWER VASCULAR LEFT COMMON FEMORAL AUGMENT: NORMAL
BH CV LOWER VASCULAR LEFT COMMON FEMORAL COMPETENT: NORMAL
BH CV LOWER VASCULAR LEFT COMMON FEMORAL COMPRESS: NORMAL
BH CV LOWER VASCULAR LEFT COMMON FEMORAL PHASIC: NORMAL
BH CV LOWER VASCULAR LEFT COMMON FEMORAL SPONT: NORMAL
BH CV LOWER VASCULAR LEFT DISTAL FEMORAL COMPRESS: NORMAL
BH CV LOWER VASCULAR LEFT GASTRONEMIUS COMPRESS: NORMAL
BH CV LOWER VASCULAR LEFT GREATER SAPH AK AUGMENT: NORMAL
BH CV LOWER VASCULAR LEFT GREATER SAPH AK COMPETENT: NORMAL
BH CV LOWER VASCULAR LEFT GREATER SAPH AK COMPRESS: NORMAL
BH CV LOWER VASCULAR LEFT GREATER SAPH AK PHASIC: NORMAL
BH CV LOWER VASCULAR LEFT GREATER SAPH AK SPONT: NORMAL
BH CV LOWER VASCULAR LEFT GREATER SAPH BK AUGMENT: NORMAL
BH CV LOWER VASCULAR LEFT GREATER SAPH BK COMPETENT: NORMAL
BH CV LOWER VASCULAR LEFT GREATER SAPH BK COMPRESS: NORMAL
BH CV LOWER VASCULAR LEFT GREATER SAPH BK PHASIC: NORMAL
BH CV LOWER VASCULAR LEFT GREATER SAPH BK SPONT: NORMAL
BH CV LOWER VASCULAR LEFT GREATER SAPH BK THROMBUS: NORMAL
BH CV LOWER VASCULAR LEFT LESSER SAPH AUGMENT: NORMAL
BH CV LOWER VASCULAR LEFT LESSER SAPH COMPETENT: NORMAL
BH CV LOWER VASCULAR LEFT LESSER SAPH PHASIC: NORMAL
BH CV LOWER VASCULAR LEFT LESSER SAPH SPONT: NORMAL
BH CV LOWER VASCULAR LEFT MID FEMORAL AUGMENT: NORMAL
BH CV LOWER VASCULAR LEFT MID FEMORAL COMPETENT: NORMAL
BH CV LOWER VASCULAR LEFT MID FEMORAL COMPRESS: NORMAL
BH CV LOWER VASCULAR LEFT MID FEMORAL PHASIC: NORMAL
BH CV LOWER VASCULAR LEFT MID FEMORAL SPONT: NORMAL
BH CV LOWER VASCULAR LEFT PERONEAL COMPRESS: NORMAL
BH CV LOWER VASCULAR LEFT POPLITEAL AUGMENT: NORMAL
BH CV LOWER VASCULAR LEFT POPLITEAL COMPETENT: NORMAL
BH CV LOWER VASCULAR LEFT POPLITEAL COMPRESS: NORMAL
BH CV LOWER VASCULAR LEFT POPLITEAL PHASIC: NORMAL
BH CV LOWER VASCULAR LEFT POPLITEAL SPONT: NORMAL
BH CV LOWER VASCULAR LEFT POSTERIOR TIBIAL COMPRESS: NORMAL
BH CV LOWER VASCULAR LEFT PROXIMAL FEMORAL COMPRESS: NORMAL
BH CV LOWER VASCULAR LEFT SAPHENOFEMORAL JUNCTION AUGMENT: NORMAL
BH CV LOWER VASCULAR LEFT SAPHENOFEMORAL JUNCTION COMPETENT: NORMAL
BH CV LOWER VASCULAR LEFT SAPHENOFEMORAL JUNCTION COMPRESS: NORMAL
BH CV LOWER VASCULAR LEFT SAPHENOFEMORAL JUNCTION PHASIC: NORMAL
BH CV LOWER VASCULAR LEFT SAPHENOFEMORAL JUNCTION SPONT: NORMAL
BH CV LOWER VASCULAR RIGHT COMMON FEMORAL AUGMENT: NORMAL
BH CV LOWER VASCULAR RIGHT COMMON FEMORAL COMPETENT: NORMAL
BH CV LOWER VASCULAR RIGHT COMMON FEMORAL COMPRESS: NORMAL
BH CV LOWER VASCULAR RIGHT COMMON FEMORAL PHASIC: NORMAL
BH CV LOWER VASCULAR RIGHT COMMON FEMORAL SPONT: NORMAL
BH CV LOWER VASCULAR RIGHT DISTAL FEMORAL COMPRESS: NORMAL
BH CV LOWER VASCULAR RIGHT GASTRONEMIUS COMPRESS: NORMAL
BH CV LOWER VASCULAR RIGHT GREATER SAPH AK AUGMENT: NORMAL
BH CV LOWER VASCULAR RIGHT GREATER SAPH AK COMPETENT: NORMAL
BH CV LOWER VASCULAR RIGHT GREATER SAPH AK COMPRESS: NORMAL
BH CV LOWER VASCULAR RIGHT GREATER SAPH AK PHASIC: NORMAL
BH CV LOWER VASCULAR RIGHT GREATER SAPH AK SPONT: NORMAL
BH CV LOWER VASCULAR RIGHT GREATER SAPH BK AUGMENT: NORMAL
BH CV LOWER VASCULAR RIGHT GREATER SAPH BK COMPETENT: NORMAL
BH CV LOWER VASCULAR RIGHT GREATER SAPH BK COMPRESS: NORMAL
BH CV LOWER VASCULAR RIGHT GREATER SAPH BK PHASIC: NORMAL
BH CV LOWER VASCULAR RIGHT GREATER SAPH BK SPONT: NORMAL
BH CV LOWER VASCULAR RIGHT LESSER SAPH AUGMENT: NORMAL
BH CV LOWER VASCULAR RIGHT LESSER SAPH COMPETENT: NORMAL
BH CV LOWER VASCULAR RIGHT LESSER SAPH PHASIC: NORMAL
BH CV LOWER VASCULAR RIGHT LESSER SAPH SPONT: NORMAL
BH CV LOWER VASCULAR RIGHT MID FEMORAL AUGMENT: NORMAL
BH CV LOWER VASCULAR RIGHT MID FEMORAL COMPETENT: NORMAL
BH CV LOWER VASCULAR RIGHT MID FEMORAL COMPRESS: NORMAL
BH CV LOWER VASCULAR RIGHT MID FEMORAL PHASIC: NORMAL
BH CV LOWER VASCULAR RIGHT MID FEMORAL SPONT: NORMAL
BH CV LOWER VASCULAR RIGHT PERONEAL COMPRESS: NORMAL
BH CV LOWER VASCULAR RIGHT POPLITEAL AUGMENT: NORMAL
BH CV LOWER VASCULAR RIGHT POPLITEAL COMPETENT: NORMAL
BH CV LOWER VASCULAR RIGHT POPLITEAL COMPRESS: NORMAL
BH CV LOWER VASCULAR RIGHT POPLITEAL PHASIC: NORMAL
BH CV LOWER VASCULAR RIGHT POPLITEAL SPONT: NORMAL
BH CV LOWER VASCULAR RIGHT POSTERIOR TIBIAL COMPRESS: NORMAL
BH CV LOWER VASCULAR RIGHT PROXIMAL FEMORAL COMPRESS: NORMAL
BH CV LOWER VASCULAR RIGHT SAPHENOFEMORAL JUNCTION AUGMENT: NORMAL
BH CV LOWER VASCULAR RIGHT SAPHENOFEMORAL JUNCTION COMPETENT: NORMAL
BH CV LOWER VASCULAR RIGHT SAPHENOFEMORAL JUNCTION COMPRESS: NORMAL
BH CV LOWER VASCULAR RIGHT SAPHENOFEMORAL JUNCTION PHASIC: NORMAL
BH CV LOWER VASCULAR RIGHT SAPHENOFEMORAL JUNCTION SPONT: NORMAL

## 2019-09-16 PROCEDURE — 93970 EXTREMITY STUDY: CPT

## 2019-09-17 ENCOUNTER — OFFICE VISIT (OUTPATIENT)
Dept: FAMILY MEDICINE CLINIC | Facility: CLINIC | Age: 74
End: 2019-09-17

## 2019-09-17 VITALS
DIASTOLIC BLOOD PRESSURE: 78 MMHG | WEIGHT: 271 LBS | TEMPERATURE: 97.8 F | HEIGHT: 67 IN | SYSTOLIC BLOOD PRESSURE: 136 MMHG | BODY MASS INDEX: 42.53 KG/M2 | OXYGEN SATURATION: 95 % | HEART RATE: 70 BPM | RESPIRATION RATE: 15 BRPM

## 2019-09-17 DIAGNOSIS — Z51.89 VISIT FOR WOUND CHECK: Primary | ICD-10-CM

## 2019-09-17 PROCEDURE — 99024 POSTOP FOLLOW-UP VISIT: CPT | Performed by: FAMILY MEDICINE

## 2019-09-17 NOTE — PROGRESS NOTES
Jesus Izaguirre Jr. is a 74 y.o. male.     Chief Complaint   Patient presents with   • Wound Check       HPI     Patient here for wound check of the bilateral lower extremities.  Has been seeing wound care.  Had numerous vascular studies done on his lower extremities.  Thus far all of come back negative.  States that the wounds seem to be healing nicely.    The following portions of the patient's history were reviewed and updated as appropriate: allergies, current medications, past family history, past medical history, past social history, past surgical history and problem list.    Review of Systems   Constitutional: Negative.    HENT: Positive for rhinorrhea.    Eyes: Negative.    Respiratory: Positive for cough.    Cardiovascular: Negative.    Gastrointestinal: Negative.    Endocrine: Negative.    Genitourinary: Negative.    Musculoskeletal: Negative.    Skin: Positive for wound.   Allergic/Immunologic: Negative.    Neurological: Negative.    Hematological: Negative.    Psychiatric/Behavioral: Negative.    All other systems reviewed and are negative.    I have reviewed and agree with documentation of above ROS.    Objective  Vitals:    09/17/19 1337   BP: 136/78   Pulse: 70   Resp: 15   Temp: 97.8 °F (36.6 °C)   SpO2: 95%       Physical Exam   Constitutional: He is oriented to person, place, and time. He appears well-developed and well-nourished. No distress.   HENT:   Head: Normocephalic and atraumatic.   Right Ear: External ear normal.   Left Ear: External ear normal.   Nose: Nose normal.   Mouth/Throat: Oropharynx is clear and moist.   Eyes: Conjunctivae and EOM are normal. Pupils are equal, round, and reactive to light. Right eye exhibits no discharge. Left eye exhibits no discharge. No scleral icterus.   Cardiovascular: Normal rate, regular rhythm and normal heart sounds.   Pulmonary/Chest: Effort normal and breath sounds normal. No respiratory distress. He has no wheezes. He has no rales.   Abdominal:  Soft. Bowel sounds are normal. He exhibits no distension. There is no tenderness.   Musculoskeletal:   Bilateral lower extremities are edematous and erythematous.  Large ulcers that are actively draining clear liquid are noted.   Neurological: He is alert and oriented to person, place, and time.   Skin: Skin is warm and dry. He is not diaphoretic.   Nursing note and vitals reviewed.        Current Outpatient Medications:   •  amLODIPine-benazepril (LOTREL) 10-40 MG per capsule, Take 1 capsule by mouth Daily., Disp: 30 capsule, Rfl: 11  •  apixaban (ELIQUIS) 5 MG tablet tablet, Take 5 mg by mouth., Disp: , Rfl:   •  aspirin 325 MG tablet, Take 1 tablet by mouth Daily., Disp: 90 tablet, Rfl: 3  •  atorvastatin (LIPITOR) 80 MG tablet, Take 1 tablet by mouth every night at bedtime., Disp: 90 tablet, Rfl: 1  •  furosemide (LASIX) 40 MG tablet, Take 2 tablets by mouth Daily., Disp: 180 tablet, Rfl: 0  •  glucose blood test strip, Amber Contour Next Test In Vitro Strip; Patient Sig: Amber Contour Next Test In Vitro Strip TEST ONCE DAILY; 50; 3; 04-Mar-2015; Active, Disp: 100 each, Rfl: 3  •  HYDROcodone-acetaminophen (NORCO) 7.5-325 MG per tablet, , Disp: , Rfl:   •  omega-3 acid ethyl esters (LOVAZA) 1 g capsule, , Disp: , Rfl:   •  propafenone (RYTHMOL) 150 MG tablet, Take 1 tablet by mouth 2 (Two) Times a Day., Disp: 60 tablet, Rfl: 6  •  SITagliptin-MetFORMIN HCl ER (JANUMET XR)  MG tablet, Take 2 tablets by mouth Daily., Disp: 180 tablet, Rfl: 3  •  sulfamethoxazole-trimethoprim (BACTRIM DS,SEPTRA DS) 800-160 MG per tablet, , Disp: , Rfl:   •  vitamin B-12 (CYANOCOBALAMIN) 1000 MCG tablet, Take  by mouth., Disp: , Rfl:   •  vitamin D (CHOLECALCIFEROL) 400 UNITS tablet, Take 400 Units by mouth daily., Disp: , Rfl:   Current outpatient and discharge medications have been reconciled for the patient.  Reviewed by: Shailesh Souza MD      Procedures    Lab Results (most recent)     Whit Lee was  seen today for wound check.    Diagnoses and all orders for this visit:    Visit for wound check      Records reviewed from his wound care visit as well as the imaging vascular studies that have been done.  Continue current therapy.  Pleased with the progress.    Return in about 2 weeks (around 10/1/2019) for Recheck.      Shailesh Souza MD

## 2019-09-18 ENCOUNTER — HOSPITAL ENCOUNTER (OUTPATIENT)
Dept: CARDIOLOGY | Facility: HOSPITAL | Age: 74
Discharge: HOME | End: 2019-09-18
Admitting: NURSE PRACTITIONER

## 2019-09-18 ENCOUNTER — OFFICE VISIT (OUTPATIENT)
Dept: WOUND CARE | Facility: HOSPITAL | Age: 74
End: 2019-09-18

## 2019-09-18 DIAGNOSIS — L97.819: ICD-10-CM

## 2019-09-18 DIAGNOSIS — I83.218: ICD-10-CM

## 2019-09-18 DIAGNOSIS — I70.203 BILATERAL FEMORAL ARTERY STENOSIS (HCC): ICD-10-CM

## 2019-09-18 LAB
BH CV LOWER ARTERIAL LEFT ABI RATIO: 1.21
BH CV LOWER ARTERIAL LEFT DORSALIS PEDIS SYS MAX: 161 MMHG
BH CV LOWER ARTERIAL LEFT GREAT TOE SYS MAX: 140 MMHG
BH CV LOWER ARTERIAL LEFT POST TIBIAL SYS MAX: 210 MMHG
BH CV LOWER ARTERIAL LEFT TBI RATIO: 0.8
BH CV LOWER ARTERIAL RIGHT ABI RATIO: 1.25
BH CV LOWER ARTERIAL RIGHT DORSALIS PEDIS SYS MAX: 158 MMHG
BH CV LOWER ARTERIAL RIGHT GREAT TOE SYS MAX: 130 MMHG
BH CV LOWER ARTERIAL RIGHT POST TIBIAL SYS MAX: 217 MMHG
BH CV LOWER ARTERIAL RIGHT TBI RATIO: 0.75
UPPER ARTERIAL LEFT ARM BRACHIAL SYS MAX: 174 MMHG
UPPER ARTERIAL RIGHT ARM BRACHIAL SYS MAX: 161 MMHG

## 2019-09-18 PROCEDURE — 93923 UPR/LXTR ART STDY 3+ LVLS: CPT

## 2019-09-25 ENCOUNTER — OFFICE VISIT (OUTPATIENT)
Dept: WOUND CARE | Facility: HOSPITAL | Age: 74
End: 2019-09-25

## 2019-10-01 ENCOUNTER — OFFICE VISIT (OUTPATIENT)
Dept: FAMILY MEDICINE CLINIC | Facility: CLINIC | Age: 74
End: 2019-10-01

## 2019-10-01 VITALS
DIASTOLIC BLOOD PRESSURE: 78 MMHG | OXYGEN SATURATION: 95 % | TEMPERATURE: 97.6 F | SYSTOLIC BLOOD PRESSURE: 126 MMHG | HEART RATE: 77 BPM | WEIGHT: 279.4 LBS | RESPIRATION RATE: 20 BRPM | HEIGHT: 67 IN | BODY MASS INDEX: 43.85 KG/M2

## 2019-10-01 DIAGNOSIS — Z51.89 VISIT FOR WOUND CHECK: Primary | ICD-10-CM

## 2019-10-01 PROCEDURE — 99024 POSTOP FOLLOW-UP VISIT: CPT | Performed by: FAMILY MEDICINE

## 2019-10-01 NOTE — PROGRESS NOTES
Jesus Izaguirre Jr. is a 74 y.o. male.     Chief Complaint   Patient presents with   • Follow-up   • Wound Check   • Peripheral Neuropathy       HPI     Patient presents to the office today for wound check of his bilateral lower extremity wounds.  States he continues to go to wound care.  Has his next appointment tomorrow.  Has been wrapping as directed.  States that he has had no pain and feels better than he has in quite some time.  Office notes reviewed from his wound care visits.  Appears that things are going well.    The following portions of the patient's history were reviewed and updated as appropriate: allergies, current medications, past family history, past medical history, past social history, past surgical history and problem list.    Review of Systems   Cardiovascular: Negative for chest pain and palpitations.   Endocrine:        Neuropathy   Skin: Positive for wound.        Diabetic wound recheck    All other systems reviewed and are negative.    I have reviewed and agree with documentation of above ROS.    Objective  Vitals:    10/01/19 1259   BP: 126/78   Pulse: 77   Resp: 20   Temp: 97.6 °F (36.4 °C)   SpO2: 95%     Body mass index is 43.75 kg/m².    Physical Exam   Constitutional: He is oriented to person, place, and time. He appears well-developed and well-nourished. No distress.   HENT:   Head: Normocephalic and atraumatic.   Right Ear: External ear normal.   Left Ear: External ear normal.   Nose: Nose normal.   Mouth/Throat: Oropharynx is clear and moist.   Eyes: Conjunctivae and EOM are normal. Pupils are equal, round, and reactive to light. Right eye exhibits no discharge. Left eye exhibits no discharge. No scleral icterus.   Cardiovascular: Normal rate, regular rhythm and normal heart sounds.   Pulmonary/Chest: Effort normal and breath sounds normal. No respiratory distress. He has no wheezes. He has no rales.   Abdominal: Soft. Bowel sounds are normal. He exhibits no distension. There is  no tenderness.   Musculoskeletal:   Bilateral lower extremities are edematous and erythematous.  Large ulcers that are actively draining clear liquid are noted.   Neurological: He is alert and oriented to person, place, and time.   Skin: Skin is warm and dry. He is not diaphoretic.   Nursing note and vitals reviewed.        Current Outpatient Medications:   •  amLODIPine-benazepril (LOTREL) 10-40 MG per capsule, Take 1 capsule by mouth Daily., Disp: 30 capsule, Rfl: 11  •  apixaban (ELIQUIS) 5 MG tablet tablet, Take 5 mg by mouth., Disp: , Rfl:   •  aspirin 325 MG tablet, Take 1 tablet by mouth Daily., Disp: 90 tablet, Rfl: 3  •  atorvastatin (LIPITOR) 80 MG tablet, Take 1 tablet by mouth every night at bedtime., Disp: 90 tablet, Rfl: 1  •  furosemide (LASIX) 40 MG tablet, Take 2 tablets by mouth Daily., Disp: 180 tablet, Rfl: 0  •  glucose blood test strip, Amber Contour Next Test In Vitro Strip; Patient Sig: Amber Contour Next Test In Vitro Strip TEST ONCE DAILY; 50; 3; 04-Mar-2015; Active, Disp: 100 each, Rfl: 3  •  HYDROcodone-acetaminophen (NORCO) 7.5-325 MG per tablet, , Disp: , Rfl:   •  omega-3 acid ethyl esters (LOVAZA) 1 g capsule, , Disp: , Rfl:   •  propafenone (RYTHMOL) 150 MG tablet, Take 1 tablet by mouth 2 (Two) Times a Day., Disp: 60 tablet, Rfl: 6  •  SITagliptin-MetFORMIN HCl ER (JANUMET XR)  MG tablet, Take 2 tablets by mouth Daily., Disp: 180 tablet, Rfl: 3  •  sulfamethoxazole-trimethoprim (BACTRIM DS,SEPTRA DS) 800-160 MG per tablet, , Disp: , Rfl:   •  vitamin B-12 (CYANOCOBALAMIN) 1000 MCG tablet, Take  by mouth., Disp: , Rfl:   •  vitamin D (CHOLECALCIFEROL) 400 UNITS tablet, Take 400 Units by mouth daily., Disp: , Rfl:   Current outpatient and discharge medications have been reconciled for the patient.  Reviewed by: Shailesh Souza MD      Procedures    Lab Results (most recent)     Whit Lee was seen today for follow-up, wound check and peripheral  neuropathy.    Diagnoses and all orders for this visit:    Visit for wound check      Records reviewed and summarized.  Continue current therapy.  I am pleased with the patient's progress.  Will stretch out follow-ups with me to every 4 weeks.    Return in about 4 weeks (around 10/29/2019) for Recheck.      Shailesh Souza MD

## 2019-10-02 ENCOUNTER — OFFICE VISIT (OUTPATIENT)
Dept: WOUND CARE | Facility: HOSPITAL | Age: 74
End: 2019-10-02

## 2019-10-09 ENCOUNTER — OFFICE VISIT (OUTPATIENT)
Dept: WOUND CARE | Facility: HOSPITAL | Age: 74
End: 2019-10-09

## 2019-10-18 ENCOUNTER — APPOINTMENT (OUTPATIENT)
Dept: WOUND CARE | Facility: HOSPITAL | Age: 74
End: 2019-10-18

## 2019-10-23 ENCOUNTER — APPOINTMENT (OUTPATIENT)
Dept: WOUND CARE | Facility: HOSPITAL | Age: 74
End: 2019-10-23

## 2019-10-24 ENCOUNTER — OFFICE VISIT (OUTPATIENT)
Dept: WOUND CARE | Facility: HOSPITAL | Age: 74
End: 2019-10-24

## 2019-10-31 ENCOUNTER — APPOINTMENT (OUTPATIENT)
Dept: WOUND CARE | Facility: HOSPITAL | Age: 74
End: 2019-10-31

## 2019-11-04 DIAGNOSIS — E11.59 TYPE 2 DIABETES MELLITUS WITH OTHER CIRCULATORY COMPLICATION, WITHOUT LONG-TERM CURRENT USE OF INSULIN (HCC): ICD-10-CM

## 2019-11-07 ENCOUNTER — OFFICE VISIT (OUTPATIENT)
Dept: WOUND CARE | Facility: HOSPITAL | Age: 74
End: 2019-11-07

## 2019-11-18 ENCOUNTER — OFFICE VISIT (OUTPATIENT)
Dept: FAMILY MEDICINE CLINIC | Facility: CLINIC | Age: 74
End: 2019-11-18

## 2019-11-18 VITALS
HEART RATE: 74 BPM | WEIGHT: 276 LBS | DIASTOLIC BLOOD PRESSURE: 88 MMHG | SYSTOLIC BLOOD PRESSURE: 144 MMHG | BODY MASS INDEX: 43.32 KG/M2 | RESPIRATION RATE: 16 BRPM | OXYGEN SATURATION: 97 % | HEIGHT: 67 IN | TEMPERATURE: 98.3 F

## 2019-11-18 DIAGNOSIS — E78.2 MIXED HYPERLIPIDEMIA: ICD-10-CM

## 2019-11-18 DIAGNOSIS — E11.42 TYPE 2 DIABETES MELLITUS WITH DIABETIC POLYNEUROPATHY, WITHOUT LONG-TERM CURRENT USE OF INSULIN (HCC): ICD-10-CM

## 2019-11-18 DIAGNOSIS — F51.01 PRIMARY INSOMNIA: ICD-10-CM

## 2019-11-18 DIAGNOSIS — I87.8 VENOUS STASIS: Primary | ICD-10-CM

## 2019-11-18 DIAGNOSIS — I10 ESSENTIAL HYPERTENSION: ICD-10-CM

## 2019-11-18 DIAGNOSIS — Z51.89 VISIT FOR WOUND CHECK: ICD-10-CM

## 2019-11-18 PROCEDURE — 99214 OFFICE O/P EST MOD 30 MIN: CPT | Performed by: FAMILY MEDICINE

## 2019-11-18 RX ORDER — DOXEPIN HYDROCHLORIDE 10 MG/1
10 CAPSULE ORAL NIGHTLY
Qty: 90 CAPSULE | Refills: 0 | Status: SHIPPED | OUTPATIENT
Start: 2019-11-18 | End: 2021-01-01

## 2019-11-18 NOTE — PROGRESS NOTES
Jesus Izaguirre Jr. is a 74 y.o. male.     Chief Complaint   Patient presents with   • Wound Check     PATIENT IS FOLLOWING UP ON WOUND.   • Neurologic Problem     PATIENT IS HAVING FOOT NEUROPATHY       HPI     Patient here to discuss venous stasis ulcers and have a wound check on his right lower extremity.  Patient has diabetes, hyperlipidemia, hypertension, and insomnia.  Currently taking amlodipine/benazepril, Eliquis, atorvastatin, furosemide, Janumet.  Tolerating these well.  Continues to see wound care.  Right lower extremity wound continues to be open but is healing slowly.  No fever chills.  Patient is having issues with insomnia.  States he has difficult time both staying and falling asleep.  Has good sleep hygiene..    The following portions of the patient's history were reviewed and updated as appropriate: allergies, current medications, past family history, past medical history, past social history, past surgical history and problem list.    Review of Systems   Musculoskeletal: Positive for joint swelling.   Skin: Positive for wound.   All other systems reviewed and are negative.    I have reviewed the ROS as documented by the MA. Shailesh Souza MD    Objective  Vitals:    11/18/19 1450   BP: 144/88   Pulse: 74   Resp: 16   Temp: 98.3 °F (36.8 °C)   SpO2: 97%     Body mass index is 43.21 kg/m².    Physical Exam   Constitutional: He is oriented to person, place, and time. He appears well-developed and well-nourished. No distress.   HENT:   Head: Normocephalic and atraumatic.   Right Ear: External ear normal.   Left Ear: External ear normal.   Nose: Nose normal.   Mouth/Throat: Oropharynx is clear and moist.   Eyes: Conjunctivae and EOM are normal. Pupils are equal, round, and reactive to light. Right eye exhibits no discharge. Left eye exhibits no discharge. No scleral icterus.   Cardiovascular: Normal rate, regular rhythm and normal heart sounds.   Pulmonary/Chest: Effort normal and breath sounds  normal. No respiratory distress. He has no wheezes. He has no rales.   Abdominal: Soft. Bowel sounds are normal. He exhibits no distension. There is no tenderness.   Musculoskeletal:   Bilateral lower extremities are edematous and erythematous.  Large ulcers that are actively draining clear liquid are noted.   Neurological: He is alert and oriented to person, place, and time.   Skin: Skin is warm and dry. He is not diaphoretic.   Nursing note and vitals reviewed.        Current Outpatient Medications:   •  amLODIPine-benazepril (LOTREL) 10-40 MG per capsule, Take 1 capsule by mouth Daily., Disp: 30 capsule, Rfl: 11  •  apixaban (ELIQUIS) 5 MG tablet tablet, Take 5 mg by mouth., Disp: , Rfl:   •  aspirin 325 MG tablet, Take 1 tablet by mouth Daily., Disp: 90 tablet, Rfl: 3  •  atorvastatin (LIPITOR) 80 MG tablet, Take 1 tablet by mouth every night at bedtime., Disp: 90 tablet, Rfl: 1  •  furosemide (LASIX) 40 MG tablet, Take 2 tablets by mouth Daily., Disp: 180 tablet, Rfl: 0  •  glucose blood test strip, Amber Contour Next Test In Vitro Strip; Patient Sig: Amber Contour Next Test In Vitro Strip TEST ONCE DAILY; 50; 3; 04-Mar-2015; Active, Disp: 100 each, Rfl: 3  •  omega-3 acid ethyl esters (LOVAZA) 1 g capsule, , Disp: , Rfl:   •  propafenone (RYTHMOL) 150 MG tablet, Take 1 tablet by mouth 2 (Two) Times a Day., Disp: 60 tablet, Rfl: 6  •  SITagliptin-metFORMIN HCl ER (JANUMET XR)  MG tablet, Take 2 tablets by mouth Daily., Disp: 180 tablet, Rfl: 3  •  vitamin B-12 (CYANOCOBALAMIN) 1000 MCG tablet, Take  by mouth., Disp: , Rfl:   •  vitamin D (CHOLECALCIFEROL) 400 UNITS tablet, Take 400 Units by mouth daily., Disp: , Rfl:   •  doxepin (SINEquan) 10 MG capsule, Take 1 capsule by mouth Every Night., Disp: 90 capsule, Rfl: 0  Current outpatient and discharge medications have been reconciled for the patient.  Reviewed by: Shailesh Souza MD      Procedures    Lab Results (most recent)     None                   Jesus was seen today for wound check and neurologic problem.    Diagnoses and all orders for this visit:    Venous stasis  -     CBC Auto Differential  -     Comprehensive Metabolic Panel  -     Hemoglobin A1c  -     Lipid Panel    Visit for wound check  -     CBC Auto Differential  -     Comprehensive Metabolic Panel  -     Hemoglobin A1c  -     Lipid Panel    Type 2 diabetes mellitus with diabetic polyneuropathy, without long-term current use of insulin (CMS/[facility])  -     CBC Auto Differential  -     Comprehensive Metabolic Panel  -     Hemoglobin A1c  -     Lipid Panel    Mixed hyperlipidemia  -     CBC Auto Differential  -     Comprehensive Metabolic Panel  -     Hemoglobin A1c  -     Lipid Panel    Essential hypertension  -     CBC Auto Differential  -     Comprehensive Metabolic Panel  -     Hemoglobin A1c  -     Lipid Panel    Primary insomnia  -     CBC Auto Differential  -     Comprehensive Metabolic Panel  -     Hemoglobin A1c  -     Lipid Panel  -     doxepin (SINEquan) 10 MG capsule; Take 1 capsule by mouth Every Night.      Labs as above.  Will adjust treatment plan accordingly.  Discussion had regarding treatment options for insomnia.  Will try doxepin as above.  Discussed potential adverse side effects.  Advised continued follow-up with specialist.  Continue medications as prescribed.    Return for As needed.      Shailesh Souza MD

## 2019-11-19 LAB
ALBUMIN SERPL-MCNC: 3.8 G/DL (ref 3.5–4.8)
ALBUMIN/GLOB SERPL: 1.3 {RATIO} (ref 1.2–2.2)
ALP SERPL-CCNC: 108 IU/L (ref 39–117)
ALT SERPL-CCNC: 13 IU/L (ref 0–44)
AST SERPL-CCNC: 12 IU/L (ref 0–40)
BASOPHILS # BLD AUTO: 0 X10E3/UL (ref 0–0.2)
BASOPHILS NFR BLD AUTO: 0 %
BILIRUB SERPL-MCNC: 0.2 MG/DL (ref 0–1.2)
BUN SERPL-MCNC: 17 MG/DL (ref 8–27)
BUN/CREAT SERPL: 18 (ref 10–24)
CALCIUM SERPL-MCNC: 9.4 MG/DL (ref 8.6–10.2)
CHLORIDE SERPL-SCNC: 100 MMOL/L (ref 96–106)
CHOLEST SERPL-MCNC: 154 MG/DL (ref 100–199)
CO2 SERPL-SCNC: 26 MMOL/L (ref 20–29)
CREAT SERPL-MCNC: 0.94 MG/DL (ref 0.76–1.27)
EOSINOPHIL # BLD AUTO: 0.2 X10E3/UL (ref 0–0.4)
EOSINOPHIL NFR BLD AUTO: 2 %
ERYTHROCYTE [DISTWIDTH] IN BLOOD BY AUTOMATED COUNT: 14.3 % (ref 12.3–15.4)
GLOBULIN SER CALC-MCNC: 3 G/DL (ref 1.5–4.5)
GLUCOSE SERPL-MCNC: 202 MG/DL (ref 65–99)
HBA1C MFR BLD: 9.3 % (ref 4.8–5.6)
HCT VFR BLD AUTO: 43.8 % (ref 37.5–51)
HDLC SERPL-MCNC: 52 MG/DL
HGB BLD-MCNC: 14.6 G/DL (ref 13–17.7)
IMM GRANULOCYTES # BLD AUTO: 0 X10E3/UL (ref 0–0.1)
IMM GRANULOCYTES NFR BLD AUTO: 0 %
LDLC SERPL CALC-MCNC: 88 MG/DL (ref 0–99)
LYMPHOCYTES # BLD AUTO: 1.4 X10E3/UL (ref 0.7–3.1)
LYMPHOCYTES NFR BLD AUTO: 14 %
MCH RBC QN AUTO: 28.6 PG (ref 26.6–33)
MCHC RBC AUTO-ENTMCNC: 33.3 G/DL (ref 31.5–35.7)
MCV RBC AUTO: 86 FL (ref 79–97)
MONOCYTES # BLD AUTO: 1 X10E3/UL (ref 0.1–0.9)
MONOCYTES NFR BLD AUTO: 10 %
NEUTROPHILS # BLD AUTO: 7.3 X10E3/UL (ref 1.4–7)
NEUTROPHILS NFR BLD AUTO: 74 %
PLATELET # BLD AUTO: 246 X10E3/UL (ref 150–450)
POTASSIUM SERPL-SCNC: 4.5 MMOL/L (ref 3.5–5.2)
PROT SERPL-MCNC: 6.8 G/DL (ref 6–8.5)
RBC # BLD AUTO: 5.11 X10E6/UL (ref 4.14–5.8)
SODIUM SERPL-SCNC: 139 MMOL/L (ref 134–144)
TRIGL SERPL-MCNC: 71 MG/DL (ref 0–149)
VLDLC SERPL CALC-MCNC: 14 MG/DL (ref 5–40)
WBC # BLD AUTO: 9.9 X10E3/UL (ref 3.4–10.8)

## 2019-11-21 ENCOUNTER — APPOINTMENT (OUTPATIENT)
Dept: WOUND CARE | Facility: HOSPITAL | Age: 74
End: 2019-11-21

## 2019-11-22 ENCOUNTER — PRIOR AUTHORIZATION (OUTPATIENT)
Dept: FAMILY MEDICINE CLINIC | Facility: CLINIC | Age: 74
End: 2019-11-22

## 2019-12-02 ENCOUNTER — OFFICE VISIT (OUTPATIENT)
Dept: WOUND CARE | Facility: HOSPITAL | Age: 74
End: 2019-12-02

## 2020-01-13 ENCOUNTER — OFFICE VISIT (OUTPATIENT)
Dept: WOUND CARE | Facility: HOSPITAL | Age: 75
End: 2020-01-13

## 2020-01-20 ENCOUNTER — OFFICE VISIT (OUTPATIENT)
Dept: WOUND CARE | Facility: HOSPITAL | Age: 75
End: 2020-01-20

## 2020-01-23 ENCOUNTER — OFFICE VISIT (OUTPATIENT)
Dept: WOUND CARE | Facility: HOSPITAL | Age: 75
End: 2020-01-23

## 2020-01-27 ENCOUNTER — OFFICE VISIT (OUTPATIENT)
Dept: WOUND CARE | Facility: HOSPITAL | Age: 75
End: 2020-01-27

## 2020-01-30 ENCOUNTER — LAB REQUISITION (OUTPATIENT)
Dept: LAB | Facility: HOSPITAL | Age: 75
End: 2020-01-30

## 2020-01-30 ENCOUNTER — OFFICE VISIT (OUTPATIENT)
Dept: WOUND CARE | Facility: HOSPITAL | Age: 75
End: 2020-01-30

## 2020-01-30 DIAGNOSIS — Z00.00 ENCOUNTER FOR GENERAL ADULT MEDICAL EXAMINATION WITHOUT ABNORMAL FINDINGS: ICD-10-CM

## 2020-01-30 PROCEDURE — 87070 CULTURE OTHR SPECIMN AEROBIC: CPT | Performed by: NURSE PRACTITIONER

## 2020-01-30 PROCEDURE — 87205 SMEAR GRAM STAIN: CPT | Performed by: NURSE PRACTITIONER

## 2020-01-30 PROCEDURE — 87075 CULTR BACTERIA EXCEPT BLOOD: CPT | Performed by: NURSE PRACTITIONER

## 2020-01-30 PROCEDURE — 87186 SC STD MICRODIL/AGAR DIL: CPT | Performed by: NURSE PRACTITIONER

## 2020-01-30 PROCEDURE — 87147 CULTURE TYPE IMMUNOLOGIC: CPT | Performed by: NURSE PRACTITIONER

## 2020-01-30 PROCEDURE — 87015 SPECIMEN INFECT AGNT CONCNTJ: CPT | Performed by: NURSE PRACTITIONER

## 2020-02-01 LAB
BACTERIA SPEC AEROBE CULT: ABNORMAL
BACTERIA SPEC AEROBE CULT: ABNORMAL
GRAM STN SPEC: ABNORMAL
GRAM STN SPEC: ABNORMAL
STREP GROUPING: ABNORMAL

## 2020-02-04 LAB — BACTERIA SPEC ANAEROBE CULT: NORMAL

## 2020-02-06 ENCOUNTER — OFFICE VISIT (OUTPATIENT)
Dept: WOUND CARE | Facility: HOSPITAL | Age: 75
End: 2020-02-06

## 2020-02-14 ENCOUNTER — OFFICE VISIT (OUTPATIENT)
Dept: WOUND CARE | Facility: HOSPITAL | Age: 75
End: 2020-02-14

## 2020-02-14 PROCEDURE — G0463 HOSPITAL OUTPT CLINIC VISIT: HCPCS

## 2020-02-20 ENCOUNTER — OFFICE VISIT (OUTPATIENT)
Dept: WOUND CARE | Facility: HOSPITAL | Age: 75
End: 2020-02-20

## 2020-02-20 PROCEDURE — G0463 HOSPITAL OUTPT CLINIC VISIT: HCPCS

## 2020-02-27 ENCOUNTER — OFFICE VISIT (OUTPATIENT)
Dept: WOUND CARE | Facility: HOSPITAL | Age: 75
End: 2020-02-27

## 2020-02-27 PROCEDURE — 29580 STRAPPING UNNA BOOT: CPT

## 2020-03-05 ENCOUNTER — APPOINTMENT (OUTPATIENT)
Dept: WOUND CARE | Facility: HOSPITAL | Age: 75
End: 2020-03-05

## 2020-03-12 ENCOUNTER — APPOINTMENT (OUTPATIENT)
Dept: WOUND CARE | Facility: HOSPITAL | Age: 75
End: 2020-03-12

## 2020-03-16 ENCOUNTER — OFFICE VISIT (OUTPATIENT)
Dept: WOUND CARE | Facility: HOSPITAL | Age: 75
End: 2020-03-16

## 2020-03-23 ENCOUNTER — OFFICE VISIT (OUTPATIENT)
Dept: WOUND CARE | Facility: HOSPITAL | Age: 75
End: 2020-03-23

## 2020-03-24 ENCOUNTER — DOCUMENTATION (OUTPATIENT)
Dept: NEUROLOGY | Facility: CLINIC | Age: 75
End: 2020-03-24

## 2020-03-24 NOTE — PROGRESS NOTES
"F/U appointment with patient via telephone today. Patient denies any new stroke/TIA symptoms. He does c/o of some sleeping issues. He is going to the [facility] for [facility] swelling.  He states his BP has been running \"140/80\", gets checked at wound care center. States his blood sugars are okay, running a little high. Labs in Nov. 2019 show an A1C of 9.3, LDL of 88. He is compliant with CPAP. He has quit smoking. All questions addressed.   "

## 2020-03-25 RX ORDER — AMLODIPINE BESYLATE AND BENAZEPRIL HYDROCHLORIDE 10; 40 MG/1; MG/1
1 CAPSULE ORAL DAILY
Qty: 30 CAPSULE | Refills: 3 | Status: SHIPPED | OUTPATIENT
Start: 2020-03-25 | End: 2020-08-19

## 2020-03-30 ENCOUNTER — LAB REQUISITION (OUTPATIENT)
Dept: LAB | Facility: HOSPITAL | Age: 75
End: 2020-03-30

## 2020-03-30 ENCOUNTER — OFFICE VISIT (OUTPATIENT)
Dept: WOUND CARE | Facility: HOSPITAL | Age: 75
End: 2020-03-30

## 2020-03-30 DIAGNOSIS — L97.812 NON-PRESSURE CHRONIC ULCER OF OTHER PART OF RIGHT LOWER LEG WITH FAT LAYER EXPOSED (HCC): ICD-10-CM

## 2020-03-30 PROCEDURE — 87186 SC STD MICRODIL/AGAR DIL: CPT | Performed by: NURSE PRACTITIONER

## 2020-03-30 PROCEDURE — 87205 SMEAR GRAM STAIN: CPT | Performed by: NURSE PRACTITIONER

## 2020-03-30 PROCEDURE — 87015 SPECIMEN INFECT AGNT CONCNTJ: CPT | Performed by: NURSE PRACTITIONER

## 2020-03-30 PROCEDURE — 87075 CULTR BACTERIA EXCEPT BLOOD: CPT | Performed by: NURSE PRACTITIONER

## 2020-03-30 PROCEDURE — 87076 CULTURE ANAEROBE IDENT EACH: CPT | Performed by: NURSE PRACTITIONER

## 2020-03-30 PROCEDURE — 87070 CULTURE OTHR SPECIMN AEROBIC: CPT | Performed by: NURSE PRACTITIONER

## 2020-04-02 LAB
BACTERIA SPEC AEROBE CULT: ABNORMAL
GRAM STN SPEC: ABNORMAL
GRAM STN SPEC: ABNORMAL

## 2020-04-06 ENCOUNTER — OFFICE VISIT (OUTPATIENT)
Dept: WOUND CARE | Facility: HOSPITAL | Age: 75
End: 2020-04-06

## 2020-04-08 LAB — BACTERIA SPEC ANAEROBE CULT: ABNORMAL

## 2020-04-13 ENCOUNTER — OFFICE VISIT (OUTPATIENT)
Dept: WOUND CARE | Facility: HOSPITAL | Age: 75
End: 2020-04-13

## 2020-04-20 ENCOUNTER — OFFICE VISIT (OUTPATIENT)
Dept: WOUND CARE | Facility: HOSPITAL | Age: 75
End: 2020-04-20

## 2020-04-20 ENCOUNTER — LAB REQUISITION (OUTPATIENT)
Dept: LAB | Facility: HOSPITAL | Age: 75
End: 2020-04-20

## 2020-04-20 DIAGNOSIS — Z00.00 ENCOUNTER FOR GENERAL ADULT MEDICAL EXAMINATION WITHOUT ABNORMAL FINDINGS: ICD-10-CM

## 2020-04-20 PROCEDURE — 87077 CULTURE AEROBIC IDENTIFY: CPT | Performed by: NURSE PRACTITIONER

## 2020-04-20 PROCEDURE — 87076 CULTURE ANAEROBE IDENT EACH: CPT | Performed by: NURSE PRACTITIONER

## 2020-04-20 PROCEDURE — 87186 SC STD MICRODIL/AGAR DIL: CPT | Performed by: NURSE PRACTITIONER

## 2020-04-20 PROCEDURE — 87147 CULTURE TYPE IMMUNOLOGIC: CPT | Performed by: NURSE PRACTITIONER

## 2020-04-20 PROCEDURE — 87075 CULTR BACTERIA EXCEPT BLOOD: CPT | Performed by: NURSE PRACTITIONER

## 2020-04-20 PROCEDURE — 87070 CULTURE OTHR SPECIMN AEROBIC: CPT | Performed by: NURSE PRACTITIONER

## 2020-04-20 PROCEDURE — 87205 SMEAR GRAM STAIN: CPT | Performed by: NURSE PRACTITIONER

## 2020-04-23 LAB
BACTERIA SPEC AEROBE CULT: ABNORMAL
GRAM STN SPEC: ABNORMAL
STREP GROUPING: ABNORMAL

## 2020-04-27 ENCOUNTER — OFFICE VISIT (OUTPATIENT)
Dept: WOUND CARE | Facility: HOSPITAL | Age: 75
End: 2020-04-27

## 2020-04-27 LAB — BACTERIA SPEC ANAEROBE CULT: ABNORMAL

## 2020-05-04 ENCOUNTER — OFFICE VISIT (OUTPATIENT)
Dept: WOUND CARE | Facility: HOSPITAL | Age: 75
End: 2020-05-04

## 2020-05-11 ENCOUNTER — APPOINTMENT (OUTPATIENT)
Dept: WOUND CARE | Facility: HOSPITAL | Age: 75
End: 2020-05-11

## 2020-05-13 ENCOUNTER — OFFICE VISIT (OUTPATIENT)
Dept: WOUND CARE | Facility: HOSPITAL | Age: 75
End: 2020-05-13

## 2020-05-18 ENCOUNTER — OFFICE VISIT (OUTPATIENT)
Dept: WOUND CARE | Facility: HOSPITAL | Age: 75
End: 2020-05-18

## 2020-05-26 ENCOUNTER — OFFICE VISIT (OUTPATIENT)
Dept: WOUND CARE | Facility: HOSPITAL | Age: 75
End: 2020-05-26

## 2020-06-01 ENCOUNTER — OFFICE VISIT (OUTPATIENT)
Dept: WOUND CARE | Facility: HOSPITAL | Age: 75
End: 2020-06-01

## 2020-06-01 PROCEDURE — G0463 HOSPITAL OUTPT CLINIC VISIT: HCPCS

## 2020-07-17 ENCOUNTER — PATIENT OUTREACH (OUTPATIENT)
Dept: CASE MANAGEMENT | Facility: OTHER | Age: 75
End: 2020-07-17

## 2020-07-17 NOTE — OUTREACH NOTE
LVM for pt to return call to schedule AWNANCY Whaley  [facility] Clinical Coordinator  [phone]

## 2020-07-20 NOTE — OUTREACH NOTE
[facility] 8/10/20 With Dr. Harley Díaz, [facility]  Network Clinical Coordinator  [phone]

## 2020-08-10 ENCOUNTER — OFFICE VISIT (OUTPATIENT)
Dept: FAMILY MEDICINE CLINIC | Facility: CLINIC | Age: 75
End: 2020-08-10

## 2020-08-10 VITALS
HEART RATE: 76 BPM | TEMPERATURE: 96.4 F | SYSTOLIC BLOOD PRESSURE: 150 MMHG | DIASTOLIC BLOOD PRESSURE: 70 MMHG | OXYGEN SATURATION: 96 % | BODY MASS INDEX: 42.31 KG/M2 | HEIGHT: 67 IN | WEIGHT: 269.6 LBS

## 2020-08-10 DIAGNOSIS — Z00.00 INITIAL MEDICARE ANNUAL WELLNESS VISIT: Primary | ICD-10-CM

## 2020-08-10 PROCEDURE — 96160 PT-FOCUSED HLTH RISK ASSMT: CPT | Performed by: FAMILY MEDICINE

## 2020-08-10 PROCEDURE — G0439 PPPS, SUBSEQ VISIT: HCPCS | Performed by: FAMILY MEDICINE

## 2020-08-10 NOTE — PROGRESS NOTES
The ABCs of the Annual Wellness Visit  Initial Medicare Wellness Visit    Chief Complaint   Patient presents with   • Medicare Wellness-Initial Visit     Patient is here for his initital AWV patient is having pain in his knees        Subjective   History of Present Illness:  Jesus Izaguirre Jr. is a 75 y.o. male who presents for an Initial Medicare Wellness Visit.    HEALTH RISK ASSESSMENT    Recent Hospitalizations:  No hospitalization(s) within the last year.    Current Medical Providers:  Patient Care Team:  Shailesh Souza MD as PCP - General (Family Medicine)  Shailesh Souza MD as PCP - Claims Attributed    Smoking Status:  Social History     Tobacco Use   Smoking Status Former Smoker   • Packs/day: 1.50   Smokeless Tobacco Former User   Tobacco Comment    Cessation 12/2014; HX of 1 ppd for 52 yrs.       Alcohol Consumption:  Social History     Substance and Sexual Activity   Alcohol Use No       Depression Screen:   PHQ-2/PHQ-9 Depression Screening 8/10/2020   Little interest or pleasure in doing things 0   Feeling down, depressed, or hopeless 0   Total Score 0       Fall Risk Screen:  STEADI Fall Risk Assessment has not been completed.    Health Habits and Functional and Cognitive Screening:  Functional & Cognitive Status 8/10/2020   Do you have difficulty preparing food and eating? No   Do you have difficulty bathing yourself, getting dressed or grooming yourself? No   Do you have difficulty using the toilet? No   Do you have difficulty moving around from place to place? Yes   Do you have trouble with steps or getting out of a bed or a chair? No   Current Diet Limited Junk Food   Dental Exam Up to date   Eye Exam Up to date   Exercise (times per week) 0 times per week   Current Exercise Activities Include None   Do you need help using the phone?  No   Are you deaf or do you have serious difficulty hearing?  No   Do you need help with transportation? No   Do you need help shopping? No   Do you need help  preparing meals?  No   Do you need help with housework?  Yes   Do you need help with laundry? Yes   Do you need help taking your medications? No   Do you need help managing money? No   Do you ever drive or ride in a car without wearing a seat belt? No         Does the patient have evidence of cognitive impairment? No    Asprin use counseling:Taking ASA but at inappropriate dose (instructed to take 325 mg ECASA daily)    Age-appropriate Screening Schedule:  Refer to the list below for future screening recommendations based on patient's age, sex and/or medical conditions. Orders for these recommended tests are listed in the plan section. The patient has been provided with a written plan.    Health Maintenance   Topic Date Due   • URINE MICROALBUMIN  1945   • ZOSTER VACCINE (1 of 2) 07/23/1995   • DIABETIC EYE EXAM  07/05/2016   • COLONOSCOPY  07/05/2016   • DIABETIC FOOT EXAM  09/21/2018   • HEMOGLOBIN A1C  05/18/2020   • INFLUENZA VACCINE  08/01/2020   • LIPID PANEL  11/18/2020   • TDAP/TD VACCINES (3 - Td) 06/17/2026          The following portions of the patient's history were reviewed and updated as appropriate: allergies, current medications, past family history, past medical history, past social history, past surgical history and problem list.    Outpatient Medications Prior to Visit   Medication Sig Dispense Refill   • amLODIPine-benazepril (LOTREL) 10-40 MG per capsule Take 1 capsule by mouth Daily. 30 capsule 3   • apixaban (ELIQUIS) 5 MG tablet tablet Take 5 mg by mouth.     • aspirin 325 MG tablet Take 1 tablet by mouth Daily. 90 tablet 3   • atorvastatin (LIPITOR) 80 MG tablet Take 1 tablet by mouth every night at bedtime. 90 tablet 1   • furosemide (LASIX) 40 MG tablet Take 2 tablets by mouth Daily. 180 tablet 0   • glucose blood test strip Amber Contour Next Test In Vitro Strip; Patient Sig: Amber Contour Next Test In Vitro Strip TEST ONCE DAILY; 50; 3; 04-Mar-2015; Active 100 each 3   •  propafenone (RYTHMOL) 150 MG tablet Take 1 tablet by mouth 2 (Two) Times a Day. 60 tablet 6   • SITagliptin-metFORMIN HCl ER (JANUMET XR)  MG tablet Take 2 tablets by mouth Daily. 180 tablet 3   • vitamin B-12 (CYANOCOBALAMIN) 1000 MCG tablet Take  by mouth.     • vitamin D (CHOLECALCIFEROL) 400 UNITS tablet Take 400 Units by mouth daily.     • doxepin (SINEquan) 10 MG capsule Take 1 capsule by mouth Every Night. 90 capsule 0   • omega-3 acid ethyl esters (LOVAZA) 1 g capsule        No facility-administered medications prior to visit.        Patient Active Problem List   Diagnosis   • Chronic ischemic right MCA stroke   • Acute bronchitis   • AB (asthmatic bronchitis)   • BPH (benign prostatic hyperplasia)   • Bradycardia   • Cough   • Dribbling from mouth   • Fatigue   • Febrile   • Gout   • Hyperlipidemia   • Hypertension   •  flu   • Obstructive sleep apnea syndrome   • Breath shortness   • Type 2 diabetes mellitus with diabetic polyneuropathy, without long-term current use of insulin (CMS/HCC)   • Vitamin B12 deficiency   • Vitamin D deficiency   • History of lithotripsy   • Calculus of kidney   • Onychia of finger   • Paronychia of finger   • Venous stasis   • Memory impairment   • Neuropathy   • Acute pain of right knee   • Atrial fibrillation (CMS/HCC)   • Tobacco abuse   • Class 2 obesity in adult       Advanced Care Planning:  ACP discussion was held with the patient during this visit. Patient does not have an advance directive, information provided.    Review of Systems   Cardiovascular: Negative for chest pain, palpitations and leg swelling.   Musculoskeletal: Positive for arthralgias.       Compared to one year ago, the patient feels his physical health is better.  Compared to one year ago, the patient feels his mental health is better.    Reviewed chart for potential of high risk medication in the elderly: yes  Reviewed chart for potential of harmful drug interactions in the  "elderly:yes    Objective         Vitals:    08/10/20 1503   BP: 150/70   Pulse: 76   Temp: 96.4 °F (35.8 °C)   SpO2: 96%   Weight: 122 kg (269 lb 9.6 oz)   Height: 170.2 cm (67.01\")       Body mass index is 42.22 kg/m².  Discussed the patient's BMI with him. The BMI is above average; BMI management plan is completed.    Physical Exam          Assessment/Plan   Medicare Risks and Personalized Health Plan  CMS Preventative Services Quick Reference  Advance Directive Discussion  Cardiovascular risk  Diabetic Lab Screening   Obesity/Overweight     The above risks/problems have been discussed with the patient.  Pertinent information has been shared with the patient in the After Visit Summary.  Follow up plans and orders are seen below in the Assessment/Plan Section.    Diagnoses and all orders for this visit:    1. Initial Medicare annual wellness visit (Primary)      Follow Up:  Return for As needed.     An After Visit Summary and PPPS were given to the patient.           "

## 2020-08-17 NOTE — PATIENT INSTRUCTIONS
Medicare Wellness  Personal Prevention Plan of Service     Date of Office Visit:  08/10/2020  Encounter Provider:  Shailesh Souza MD  Place of Service:  [facility]  Patient Name: Jesus Izaguirre Jr.  :  1945    As part of the Medicare Wellness portion of your visit today, we are providing you with this personalized preventive plan of services (PPPS). This plan is based upon recommendations of the [place] Preventive Services Task Force (USPSTF) and the Advisory Committee on Immunization Practices (ACIP).    This lists the preventive care services that should be considered, and provides dates of when you are due. Items listed as completed are up-to-date and do not require any further intervention.    Health Maintenance   Topic Date Due   • URINE MICROALBUMIN  1945   • ZOSTER VACCINE (1 of 2) 1995   • HEPATITIS C SCREENING  2016   • DIABETIC EYE EXAM  2016   • AAA SCREEN (ONE-TIME)  2016   • COLONOSCOPY  2016   • DIABETIC FOOT EXAM  2018   • HEMOGLOBIN A1C  2020   • INFLUENZA VACCINE  2020   • LIPID PANEL  2020   • MEDICARE ANNUAL WELLNESS  08/10/2021   • TDAP/TD VACCINES (3 - Td) 2026   • Pneumococcal Vaccine Once at 65 Years Old  Completed       No orders of the defined types were placed in this encounter.      Return for As needed.

## 2020-08-19 RX ORDER — AMLODIPINE BESYLATE AND BENAZEPRIL HYDROCHLORIDE 10; 40 MG/1; MG/1
CAPSULE ORAL
Qty: 30 CAPSULE | Refills: 2 | Status: SHIPPED | OUTPATIENT
Start: 2020-08-19 | End: 2021-01-01 | Stop reason: SDUPTHER

## 2020-12-30 RX ORDER — ATORVASTATIN CALCIUM 80 MG/1
80 TABLET, FILM COATED ORAL
Qty: 90 TABLET | Refills: 0 | Status: SHIPPED | OUTPATIENT
Start: 2020-12-30 | End: 2021-01-01 | Stop reason: SDUPTHER

## 2021-01-01 ENCOUNTER — OFFICE VISIT (OUTPATIENT)
Dept: FAMILY MEDICINE CLINIC | Facility: CLINIC | Age: 76
End: 2021-01-01

## 2021-01-01 ENCOUNTER — HOSPITAL ENCOUNTER (OUTPATIENT)
Dept: CARDIOLOGY | Facility: HOSPITAL | Age: 76
Discharge: HOME | End: 2021-05-19
Admitting: INTERNAL MEDICINE

## 2021-01-01 ENCOUNTER — TELEPHONE (OUTPATIENT)
Dept: FAMILY MEDICINE CLINIC | Facility: CLINIC | Age: 76
End: 2021-01-01

## 2021-01-01 ENCOUNTER — OFFICE VISIT (OUTPATIENT)
Dept: CARDIOLOGY | Facility: CLINIC | Age: 76
End: 2021-01-01

## 2021-01-01 ENCOUNTER — TELEPHONE (OUTPATIENT)
Dept: CARDIOLOGY | Facility: CLINIC | Age: 76
End: 2021-01-01

## 2021-01-01 VITALS
BODY MASS INDEX: 37.35 KG/M2 | DIASTOLIC BLOOD PRESSURE: 90 MMHG | SYSTOLIC BLOOD PRESSURE: 150 MMHG | HEART RATE: 67 BPM | OXYGEN SATURATION: 90 % | WEIGHT: 266.76 LBS | HEIGHT: 71 IN

## 2021-01-01 VITALS
TEMPERATURE: 97.7 F | HEART RATE: 84 BPM | SYSTOLIC BLOOD PRESSURE: 140 MMHG | OXYGEN SATURATION: 96 % | HEIGHT: 71 IN | DIASTOLIC BLOOD PRESSURE: 74 MMHG | WEIGHT: 262 LBS | BODY MASS INDEX: 36.68 KG/M2

## 2021-01-01 VITALS
SYSTOLIC BLOOD PRESSURE: 142 MMHG | HEART RATE: 56 BPM | TEMPERATURE: 96.9 F | WEIGHT: 260 LBS | DIASTOLIC BLOOD PRESSURE: 80 MMHG | OXYGEN SATURATION: 91 % | HEIGHT: 71 IN | BODY MASS INDEX: 36.4 KG/M2

## 2021-01-01 VITALS
SYSTOLIC BLOOD PRESSURE: 140 MMHG | HEIGHT: 71 IN | BODY MASS INDEX: 37.21 KG/M2 | OXYGEN SATURATION: 97 % | DIASTOLIC BLOOD PRESSURE: 72 MMHG | TEMPERATURE: 97.1 F | WEIGHT: 265.8 LBS | HEART RATE: 61 BPM

## 2021-01-01 VITALS
WEIGHT: 265.6 LBS | OXYGEN SATURATION: 95 % | SYSTOLIC BLOOD PRESSURE: 150 MMHG | HEART RATE: 75 BPM | BODY MASS INDEX: 37.18 KG/M2 | DIASTOLIC BLOOD PRESSURE: 88 MMHG | TEMPERATURE: 98 F | HEIGHT: 71 IN

## 2021-01-01 VITALS
HEART RATE: 43 BPM | DIASTOLIC BLOOD PRESSURE: 70 MMHG | TEMPERATURE: 98 F | BODY MASS INDEX: 37.1 KG/M2 | OXYGEN SATURATION: 97 % | SYSTOLIC BLOOD PRESSURE: 134 MMHG | HEIGHT: 71 IN | WEIGHT: 265 LBS

## 2021-01-01 VITALS
DIASTOLIC BLOOD PRESSURE: 72 MMHG | SYSTOLIC BLOOD PRESSURE: 150 MMHG | WEIGHT: 254.2 LBS | BODY MASS INDEX: 35.59 KG/M2 | HEIGHT: 71 IN | HEART RATE: 86 BPM

## 2021-01-01 VITALS
SYSTOLIC BLOOD PRESSURE: 130 MMHG | HEART RATE: 86 BPM | DIASTOLIC BLOOD PRESSURE: 80 MMHG | HEIGHT: 71 IN | WEIGHT: 267 LBS | BODY MASS INDEX: 37.38 KG/M2

## 2021-01-01 DIAGNOSIS — R06.02 SHORTNESS OF BREATH: ICD-10-CM

## 2021-01-01 DIAGNOSIS — I10 PRIMARY HYPERTENSION: ICD-10-CM

## 2021-01-01 DIAGNOSIS — E11.42 TYPE 2 DIABETES MELLITUS WITH DIABETIC POLYNEUROPATHY, WITHOUT LONG-TERM CURRENT USE OF INSULIN (HCC): Primary | ICD-10-CM

## 2021-01-01 DIAGNOSIS — T14.8XXA MULTIPLE WOUNDS OF SKIN: Primary | ICD-10-CM

## 2021-01-01 DIAGNOSIS — E55.9 VITAMIN D DEFICIENCY: ICD-10-CM

## 2021-01-01 DIAGNOSIS — I48.0 PAROXYSMAL ATRIAL FIBRILLATION (HCC): Primary | ICD-10-CM

## 2021-01-01 DIAGNOSIS — Z12.5 SPECIAL SCREENING FOR MALIGNANT NEOPLASM OF PROSTATE: ICD-10-CM

## 2021-01-01 DIAGNOSIS — I48.11 LONGSTANDING PERSISTENT ATRIAL FIBRILLATION (HCC): ICD-10-CM

## 2021-01-01 DIAGNOSIS — I10 ESSENTIAL HYPERTENSION: ICD-10-CM

## 2021-01-01 DIAGNOSIS — L03.311 CELLULITIS OF ABDOMINAL WALL: Primary | ICD-10-CM

## 2021-01-01 DIAGNOSIS — Z13.0 SCREENING FOR IRON DEFICIENCY ANEMIA: ICD-10-CM

## 2021-01-01 DIAGNOSIS — E53.8 VITAMIN B12 DEFICIENCY: ICD-10-CM

## 2021-01-01 DIAGNOSIS — I87.8 VENOUS STASIS: Primary | ICD-10-CM

## 2021-01-01 DIAGNOSIS — E78.2 MIXED HYPERLIPIDEMIA: ICD-10-CM

## 2021-01-01 DIAGNOSIS — Z72.0 TOBACCO ABUSE: ICD-10-CM

## 2021-01-01 DIAGNOSIS — Z12.11 COLON CANCER SCREENING: ICD-10-CM

## 2021-01-01 DIAGNOSIS — I87.8 VENOUS STASIS: ICD-10-CM

## 2021-01-01 DIAGNOSIS — I48.0 PAROXYSMAL ATRIAL FIBRILLATION (HCC): ICD-10-CM

## 2021-01-01 DIAGNOSIS — E11.42 TYPE 2 DIABETES MELLITUS WITH DIABETIC POLYNEUROPATHY, WITHOUT LONG-TERM CURRENT USE OF INSULIN (HCC): ICD-10-CM

## 2021-01-01 LAB
25(OH)D3+25(OH)D2 SERPL-MCNC: 16.2 NG/ML (ref 30–100)
ALBUMIN SERPL-MCNC: 3.9 G/DL (ref 3.5–5.2)
ALBUMIN/GLOB SERPL: 1.4 G/DL
ALP SERPL-CCNC: 103 U/L (ref 39–117)
ALT SERPL-CCNC: 23 U/L (ref 1–41)
ASCENDING AORTA: 3.8 CM
AST SERPL-CCNC: 20 U/L (ref 1–40)
BASOPHILS # BLD AUTO: 0.07 10*3/MM3 (ref 0–0.2)
BASOPHILS NFR BLD AUTO: 0.8 % (ref 0–1.5)
BH CV ECHO MEAS - ACS: 2.3 CM
BH CV ECHO MEAS - AO MAX PG (FULL): 6.7 MMHG
BH CV ECHO MEAS - AO MAX PG: 13.7 MMHG
BH CV ECHO MEAS - AO MEAN PG (FULL): 3 MMHG
BH CV ECHO MEAS - AO MEAN PG: 7 MMHG
BH CV ECHO MEAS - AO ROOT AREA (BSA CORRECTED): 1.5
BH CV ECHO MEAS - AO ROOT AREA: 10.2 CM^2
BH CV ECHO MEAS - AO ROOT DIAM: 3.6 CM
BH CV ECHO MEAS - AO V2 MAX: 185 CM/SEC
BH CV ECHO MEAS - AO V2 MEAN: 127 CM/SEC
BH CV ECHO MEAS - AO V2 VTI: 33.6 CM
BH CV ECHO MEAS - ASC AORTA: 3.8 CM
BH CV ECHO MEAS - AVA(I,A): 3 CM^2
BH CV ECHO MEAS - AVA(I,D): 3 CM^2
BH CV ECHO MEAS - AVA(V,A): 2.7 CM^2
BH CV ECHO MEAS - AVA(V,D): 2.7 CM^2
BH CV ECHO MEAS - BSA(HAYCOCK): 2.5 M^2
BH CV ECHO MEAS - BSA: 2.4 M^2
BH CV ECHO MEAS - BZI_BMI: 38.3 KILOGRAMS/M^2
BH CV ECHO MEAS - BZI_METRIC_HEIGHT: 177.8 CM
BH CV ECHO MEAS - BZI_METRIC_WEIGHT: 121.1 KG
BH CV ECHO MEAS - EDV(MOD-SP2): 119 ML
BH CV ECHO MEAS - EDV(MOD-SP4): 132 ML
BH CV ECHO MEAS - EDV(TEICH): 216 ML
BH CV ECHO MEAS - EF(CUBED): 66.8 %
BH CV ECHO MEAS - EF(MOD-BP): 50.2 %
BH CV ECHO MEAS - EF(MOD-SP2): 49.6 %
BH CV ECHO MEAS - EF(MOD-SP4): 49.2 %
BH CV ECHO MEAS - EF(TEICH): 57.2 %
BH CV ECHO MEAS - ESV(MOD-SP2): 60 ML
BH CV ECHO MEAS - ESV(MOD-SP4): 67 ML
BH CV ECHO MEAS - ESV(TEICH): 92.4 ML
BH CV ECHO MEAS - FS: 30.8 %
BH CV ECHO MEAS - IVS/LVPW: 1.1
BH CV ECHO MEAS - IVSD: 1.9 CM
BH CV ECHO MEAS - LAT PEAK E' VEL: 7.5 CM/SEC
BH CV ECHO MEAS - LV DIASTOLIC VOL/BSA (35-75): 55.9 ML/M^2
BH CV ECHO MEAS - LV MASS(C)D: 655 GRAMS
BH CV ECHO MEAS - LV MASS(C)DI: 277.6 GRAMS/M^2
BH CV ECHO MEAS - LV MAX PG: 7 MMHG
BH CV ECHO MEAS - LV MEAN PG: 4 MMHG
BH CV ECHO MEAS - LV SYSTOLIC VOL/BSA (12-30): 28.4 ML/M^2
BH CV ECHO MEAS - LV V1 MAX: 132 CM/SEC
BH CV ECHO MEAS - LV V1 MEAN: 89 CM/SEC
BH CV ECHO MEAS - LV V1 VTI: 26.8 CM
BH CV ECHO MEAS - LVIDD: 6.5 CM
BH CV ECHO MEAS - LVIDS: 4.5 CM
BH CV ECHO MEAS - LVLD AP2: 9 CM
BH CV ECHO MEAS - LVLD AP4: 8.6 CM
BH CV ECHO MEAS - LVLS AP2: 7.7 CM
BH CV ECHO MEAS - LVLS AP4: 7.9 CM
BH CV ECHO MEAS - LVOT AREA (M): 3.8 CM^2
BH CV ECHO MEAS - LVOT AREA: 3.8 CM^2
BH CV ECHO MEAS - LVOT DIAM: 2.2 CM
BH CV ECHO MEAS - LVPWD: 1.8 CM
BH CV ECHO MEAS - MED PEAK E' VEL: 5.2 CM/SEC
BH CV ECHO MEAS - MV A DUR: 0.15 SEC
BH CV ECHO MEAS - MV A MAX VEL: 109 CM/SEC
BH CV ECHO MEAS - MV DEC SLOPE: 507 CM/SEC^2
BH CV ECHO MEAS - MV DEC TIME: 0.25 SEC
BH CV ECHO MEAS - MV E MAX VEL: 101 CM/SEC
BH CV ECHO MEAS - MV E/A: 0.93
BH CV ECHO MEAS - MV MAX PG: 6 MMHG
BH CV ECHO MEAS - MV MEAN PG: 2 MMHG
BH CV ECHO MEAS - MV P1/2T MAX VEL: 97.7 CM/SEC
BH CV ECHO MEAS - MV P1/2T: 56.4 MSEC
BH CV ECHO MEAS - MV V2 MAX: 122 CM/SEC
BH CV ECHO MEAS - MV V2 MEAN: 69.1 CM/SEC
BH CV ECHO MEAS - MV V2 VTI: 38.8 CM
BH CV ECHO MEAS - MVA P1/2T LCG: 2.3 CM^2
BH CV ECHO MEAS - MVA(P1/2T): 3.9 CM^2
BH CV ECHO MEAS - MVA(VTI): 2.6 CM^2
BH CV ECHO MEAS - PA ACC TIME: 0.14 SEC
BH CV ECHO MEAS - PA MAX PG (FULL): 2.5 MMHG
BH CV ECHO MEAS - PA MAX PG: 4.5 MMHG
BH CV ECHO MEAS - PA PR(ACCEL): 14.7 MMHG
BH CV ECHO MEAS - PA V2 MAX: 106 CM/SEC
BH CV ECHO MEAS - PULM A REVS DUR: 0.12 SEC
BH CV ECHO MEAS - PULM A REVS VEL: 27.9 CM/SEC
BH CV ECHO MEAS - PULM DIAS VEL: 42 CM/SEC
BH CV ECHO MEAS - PULM S/D: 0.78
BH CV ECHO MEAS - PULM SYS VEL: 32.6 CM/SEC
BH CV ECHO MEAS - PVA(V,A): 3.8 CM^2
BH CV ECHO MEAS - PVA(V,D): 3.8 CM^2
BH CV ECHO MEAS - QP/QS: 0.69
BH CV ECHO MEAS - RAP SYSTOLE: 3 MMHG
BH CV ECHO MEAS - RV MAX PG: 2 MMHG
BH CV ECHO MEAS - RV MEAN PG: 1 MMHG
BH CV ECHO MEAS - RV V1 MAX: 70.5 CM/SEC
BH CV ECHO MEAS - RV V1 MEAN: 46.8 CM/SEC
BH CV ECHO MEAS - RV V1 VTI: 12.3 CM
BH CV ECHO MEAS - RVOT AREA: 5.7 CM^2
BH CV ECHO MEAS - RVOT DIAM: 2.7 CM
BH CV ECHO MEAS - SI(AO): 144.9 ML/M^2
BH CV ECHO MEAS - SI(CUBED): 77.8 ML/M^2
BH CV ECHO MEAS - SI(LVOT): 43.2 ML/M^2
BH CV ECHO MEAS - SI(MOD-SP2): 25 ML/M^2
BH CV ECHO MEAS - SI(MOD-SP4): 27.5 ML/M^2
BH CV ECHO MEAS - SI(TEICH): 52.4 ML/M^2
BH CV ECHO MEAS - SUP REN AO DIAM: 3.3 CM
BH CV ECHO MEAS - SV(AO): 342 ML
BH CV ECHO MEAS - SV(CUBED): 183.5 ML
BH CV ECHO MEAS - SV(LVOT): 101.9 ML
BH CV ECHO MEAS - SV(MOD-SP2): 59 ML
BH CV ECHO MEAS - SV(MOD-SP4): 65 ML
BH CV ECHO MEAS - SV(RVOT): 70.4 ML
BH CV ECHO MEAS - SV(TEICH): 123.6 ML
BH CV ECHO MEAS - TAPSE (>1.6): 2.2 CM
BH CV ECHO MEASUREMENTS AVERAGE E/E' RATIO: 15.91
BH CV XLRA - RV BASE: 2.9 CM
BH CV XLRA - RV LENGTH: 8.1 CM
BH CV XLRA - RV MID: 2.1 CM
BH CV XLRA - TDI S': 9.9 CM/SEC
BILIRUB SERPL-MCNC: 0.3 MG/DL (ref 0–1.2)
BUN SERPL-MCNC: 21 MG/DL (ref 8–23)
BUN/CREAT SERPL: 18.8 (ref 7–25)
CALCIUM SERPL-MCNC: 9.8 MG/DL (ref 8.6–10.5)
CHLORIDE SERPL-SCNC: 103 MMOL/L (ref 98–107)
CHOLEST SERPL-MCNC: 115 MG/DL (ref 0–200)
CO2 SERPL-SCNC: 27.4 MMOL/L (ref 22–29)
CREAT SERPL-MCNC: 1.12 MG/DL (ref 0.76–1.27)
EOSINOPHIL # BLD AUTO: 0.28 10*3/MM3 (ref 0–0.4)
EOSINOPHIL NFR BLD AUTO: 3.4 % (ref 0.3–6.2)
ERYTHROCYTE [DISTWIDTH] IN BLOOD BY AUTOMATED COUNT: 12.7 % (ref 12.3–15.4)
EXPIRATION DATE: ABNORMAL
FOLATE SERPL-MCNC: 6.38 NG/ML (ref 4.78–24.2)
GLOBULIN SER CALC-MCNC: 2.7 GM/DL
GLUCOSE SERPL-MCNC: 145 MG/DL (ref 65–99)
HBA1C MFR BLD: 8.3 %
HBA1C MFR BLD: 8.9 %
HBA1C MFR BLD: 9.3 %
HCT VFR BLD AUTO: 47.1 % (ref 37.5–51)
HDLC SERPL-MCNC: 49 MG/DL (ref 40–60)
HGB BLD-MCNC: 16.3 G/DL (ref 13–17.7)
IMM GRANULOCYTES # BLD AUTO: 0.05 10*3/MM3 (ref 0–0.05)
IMM GRANULOCYTES NFR BLD AUTO: 0.6 % (ref 0–0.5)
LDLC SERPL CALC-MCNC: 53 MG/DL (ref 0–100)
LDLC/HDLC SERPL: 1.12 {RATIO}
LEFT ATRIUM VOLUME INDEX: 30 ML/M2
LYMPHOCYTES # BLD AUTO: 1.93 10*3/MM3 (ref 0.7–3.1)
LYMPHOCYTES NFR BLD AUTO: 23.4 % (ref 19.6–45.3)
Lab: ABNORMAL
MAXIMAL PREDICTED HEART RATE: 145 BPM
MCH RBC QN AUTO: 29.5 PG (ref 26.6–33)
MCHC RBC AUTO-ENTMCNC: 34.6 G/DL (ref 31.5–35.7)
MCV RBC AUTO: 85.2 FL (ref 79–97)
MICROALBUMIN UR-MCNC: 1287.3 UG/ML
MONOCYTES # BLD AUTO: 0.87 10*3/MM3 (ref 0.1–0.9)
MONOCYTES NFR BLD AUTO: 10.6 % (ref 5–12)
NEUTROPHILS # BLD AUTO: 5.04 10*3/MM3 (ref 1.7–7)
NEUTROPHILS NFR BLD AUTO: 61.2 % (ref 42.7–76)
NRBC BLD AUTO-RTO: 0 /100 WBC (ref 0–0.2)
PLATELET # BLD AUTO: 240 10*3/MM3 (ref 140–450)
POTASSIUM SERPL-SCNC: 4.7 MMOL/L (ref 3.5–5.2)
PROT SERPL-MCNC: 6.6 G/DL (ref 6–8.5)
PSA SERPL-MCNC: 2.26 NG/ML (ref 0–4)
RBC # BLD AUTO: 5.53 10*6/MM3 (ref 4.14–5.8)
SINUS: 3.1 CM
SODIUM SERPL-SCNC: 140 MMOL/L (ref 136–145)
STJ: 3.1 CM
STRESS TARGET HR: 123 BPM
TRIGL SERPL-MCNC: 56 MG/DL (ref 0–150)
VIT B12 SERPL-MCNC: 358 PG/ML (ref 211–946)
VLDLC SERPL CALC-MCNC: 13 MG/DL (ref 5–40)
WBC # BLD AUTO: 8.24 10*3/MM3 (ref 3.4–10.8)

## 2021-01-01 PROCEDURE — 99214 OFFICE O/P EST MOD 30 MIN: CPT | Performed by: NURSE PRACTITIONER

## 2021-01-01 PROCEDURE — 99204 OFFICE O/P NEW MOD 45 MIN: CPT | Performed by: INTERNAL MEDICINE

## 2021-01-01 PROCEDURE — 93306 TTE W/DOPPLER COMPLETE: CPT

## 2021-01-01 PROCEDURE — 93000 ELECTROCARDIOGRAM COMPLETE: CPT | Performed by: INTERNAL MEDICINE

## 2021-01-01 PROCEDURE — 83036 HEMOGLOBIN GLYCOSYLATED A1C: CPT | Performed by: NURSE PRACTITIONER

## 2021-01-01 PROCEDURE — 99214 OFFICE O/P EST MOD 30 MIN: CPT | Performed by: INTERNAL MEDICINE

## 2021-01-01 PROCEDURE — 99213 OFFICE O/P EST LOW 20 MIN: CPT | Performed by: NURSE PRACTITIONER

## 2021-01-01 PROCEDURE — 93000 ELECTROCARDIOGRAM COMPLETE: CPT | Performed by: NURSE PRACTITIONER

## 2021-01-01 PROCEDURE — 93306 TTE W/DOPPLER COMPLETE: CPT | Performed by: INTERNAL MEDICINE

## 2021-01-01 RX ORDER — ERGOCALCIFEROL 1.25 MG/1
50000 CAPSULE ORAL WEEKLY
Qty: 12 CAPSULE | Refills: 3 | Status: SHIPPED | OUTPATIENT
Start: 2021-01-01

## 2021-01-01 RX ORDER — ATENOLOL 25 MG/1
25 TABLET ORAL DAILY
Qty: 90 TABLET | Refills: 3 | Status: SHIPPED | OUTPATIENT
Start: 2021-01-01 | End: 2021-01-01 | Stop reason: SDUPTHER

## 2021-01-01 RX ORDER — SITAGLIPTIN AND METFORMIN HYDROCHLORIDE 1000; 50 MG/1; MG/1
1 TABLET, FILM COATED, EXTENDED RELEASE ORAL DAILY
COMMUNITY
End: 2021-01-01 | Stop reason: SDUPTHER

## 2021-01-01 RX ORDER — SULFAMETHOXAZOLE AND TRIMETHOPRIM 800; 160 MG/1; MG/1
1 TABLET ORAL 2 TIMES DAILY
Qty: 20 TABLET | Refills: 0 | Status: ON HOLD | OUTPATIENT
Start: 2021-01-01 | End: 2022-01-01

## 2021-01-01 RX ORDER — ATENOLOL 50 MG/1
50 TABLET ORAL DAILY
Qty: 90 TABLET | Refills: 3 | Status: SHIPPED | OUTPATIENT
Start: 2021-01-01 | End: 2022-01-01 | Stop reason: HOSPADM

## 2021-01-01 RX ORDER — SEMAGLUTIDE 1.34 MG/ML
0.25 INJECTION, SOLUTION SUBCUTANEOUS WEEKLY
Qty: 3 ML | Refills: 3 | Status: SHIPPED | OUTPATIENT
Start: 2021-01-01 | End: 2021-01-01

## 2021-01-01 RX ORDER — SEMAGLUTIDE 1.34 MG/ML
0.25 INJECTION, SOLUTION SUBCUTANEOUS WEEKLY
Qty: 3 ML | Refills: 3 | Status: SHIPPED | OUTPATIENT
Start: 2021-01-01 | End: 2021-01-01 | Stop reason: SDUPTHER

## 2021-01-01 RX ORDER — DAPAGLIFLOZIN 10 MG/1
10 TABLET, FILM COATED ORAL DAILY
Qty: 90 TABLET | Refills: 3 | Status: SHIPPED | OUTPATIENT
Start: 2021-01-01

## 2021-01-01 RX ORDER — SITAGLIPTIN AND METFORMIN HYDROCHLORIDE 1000; 50 MG/1; MG/1
1 TABLET, FILM COATED, EXTENDED RELEASE ORAL 2 TIMES DAILY
Qty: 180 TABLET | Refills: 3 | Status: ON HOLD | OUTPATIENT
Start: 2021-01-01 | End: 2022-01-01

## 2021-01-01 RX ORDER — NAPROXEN SODIUM 220 MG
220 TABLET ORAL 2 TIMES DAILY PRN
Status: ON HOLD | COMMUNITY
End: 2022-01-01

## 2021-01-01 RX ORDER — ATORVASTATIN CALCIUM 80 MG/1
80 TABLET, FILM COATED ORAL
Qty: 90 TABLET | Refills: 3 | Status: SHIPPED | OUTPATIENT
Start: 2021-01-01

## 2021-01-01 RX ORDER — AMLODIPINE BESYLATE AND BENAZEPRIL HYDROCHLORIDE 10; 40 MG/1; MG/1
1 CAPSULE ORAL DAILY
Qty: 90 CAPSULE | Refills: 3 | Status: SHIPPED | OUTPATIENT
Start: 2021-01-01 | End: 2022-01-01 | Stop reason: HOSPADM

## 2021-01-21 ENCOUNTER — OFFICE VISIT (OUTPATIENT)
Dept: NEUROLOGY | Facility: CLINIC | Age: 76
End: 2021-01-21

## 2021-01-21 DIAGNOSIS — I67.9 CEREBROVASCULAR DISEASE: Primary | ICD-10-CM

## 2021-01-21 DIAGNOSIS — G47.33 OBSTRUCTIVE SLEEP APNEA SYNDROME: ICD-10-CM

## 2021-01-21 DIAGNOSIS — Z72.0 TOBACCO ABUSE: ICD-10-CM

## 2021-01-21 DIAGNOSIS — I48.0 PAROXYSMAL ATRIAL FIBRILLATION (HCC): ICD-10-CM

## 2021-01-21 DIAGNOSIS — I10 ESSENTIAL HYPERTENSION: ICD-10-CM

## 2021-01-21 DIAGNOSIS — E78.2 MIXED HYPERLIPIDEMIA: ICD-10-CM

## 2021-01-21 DIAGNOSIS — E11.42 TYPE 2 DIABETES MELLITUS WITH DIABETIC POLYNEUROPATHY, WITHOUT LONG-TERM CURRENT USE OF INSULIN (HCC): ICD-10-CM

## 2021-01-21 PROCEDURE — 99443 PR PHYS/QHP TELEPHONE EVALUATION 21-30 MIN: CPT | Performed by: NURSE PRACTITIONER

## 2021-01-21 NOTE — PROGRESS NOTES
DOS: 2021  NAME: Jesus Izaguirre Jr.   : 1945  PCP: Provider, No Known    Chief Complaint   Patient presents with   • Stroke      I performed this clinical encounter via real-time telephone connection originating from the patient's location at home and my location at [facility]. Verbal consent to participate in this telephone clinical visit was obtained and any questions by the patient regarding the interaction were answered.  This visit occurred during the coronavirus (Covid-19) Public Health Emergency.     SUBJECTIVE  Neurological Problem:  75 y.o. RHW male with Afib (on Eliquis),HTN, HLD, DM, YEISON (compliant with CPAP), tobacco abuse h/o stroke with hemorrhagic conversion and kidney stones.  He presents today for stroke follow-up via telephone.     Interval History:   **For previous detailed history, please see progress note dated 3/29/19.    Mr. Izaguirre has a h/o RMCA infarct he suffered in  that was secondary to M1 occlusion embolic from afib (detected on LINQ) or artery to artery embolism from his cerebral atherosclerotic disease. He was last seen in the office in 2020 and by telephone visit in 2020.     He tells me today that he continues on Elqius, ASA and Lipitor. He is tolerating well, no signs/symptoms of bleeding. He denies any new stroke/TIA symptoms.  He does c/o of short term memory difficulty, primarily some forgetfulness, no real change from previous. He states his BP is well controlled, has an APRN come in once a month d/t wound on foot, they check pressures says BPs have been good. No recent labs, in 2019 A1C was 9.3; Lipid panel with total of 154, HDL 52, LDL 88, TG 71. He states he has lost weight, not eating very much, knees are bad and does not get much exercise. He had Covid in Oct. 2020, did not have to go to the hospital, no sequlae except for residual generalized weakness. He uses a cane to ambulate, reports having a fall when he tripped on the  deck also a fall after his spouse passed away last year. He is compliant with CPAP. He was followed by Dr. Granados, cardiology, but has not been in to see him in over a year. He has started smoking again, about 1/2 pack/day and then recently quit.     Review of Systems:Review of Systems   Constitutional: Positive for fatigue. Negative for activity change and appetite change.   HENT: Negative for ear pain, tinnitus and trouble swallowing.    Eyes: Negative for photophobia, pain and visual disturbance.   Musculoskeletal: Positive for back pain. Negative for gait problem and neck pain.   Neurological: Negative for dizziness, tremors, seizures, syncope, facial asymmetry, speech difficulty, weakness, light-headedness, numbness and headaches.   Psychiatric/Behavioral: Negative for agitation, behavioral problems, confusion, decreased concentration, dysphoric mood, hallucinations, self-injury, sleep disturbance and suicidal ideas. The patient is not nervous/anxious and is not hyperactive.     Above ROS reviewed    The following portions of the patient's history were reviewed and updated as appropriate: allergies, current medications, past family history, past medical history, past social history, past surgical history and problem list.    Current Medications:   Current Outpatient Medications:   •  acetaminophen (TYLENOL) 500 MG tablet, Take 500 mg by mouth Every 6 (Six) Hours As Needed for Mild Pain ., Disp: , Rfl:   •  amLODIPine-benazepril (LOTREL) 10-40 MG per capsule, TAKE ONE CAPSULE BY MOUTH DAILY, Disp: 30 capsule, Rfl: 2  •  apixaban (ELIQUIS) 5 MG tablet tablet, Take 5 mg by mouth., Disp: , Rfl:   •  aspirin 325 MG tablet, Take 1 tablet by mouth Daily., Disp: 90 tablet, Rfl: 3  •  atorvastatin (LIPITOR) 80 MG tablet, Take 1 tablet by mouth every night at bedtime., Disp: 90 tablet, Rfl: 0  •  doxepin (SINEquan) 10 MG capsule, Take 1 capsule by mouth Every Night., Disp: 90 capsule, Rfl: 0  •  furosemide (LASIX) 40 MG  tablet, Take 2 tablets by mouth Daily., Disp: 180 tablet, Rfl: 0  •  glucose blood test strip, Amber Contour Next Test In Vitro Strip; Patient Sig: Amber Contour Next Test In Vitro Strip TEST ONCE DAILY; 50; 3; 04-Mar-2015; Active, Disp: 100 each, Rfl: 3  •  propafenone (RYTHMOL) 150 MG tablet, Take 1 tablet by mouth 2 (Two) Times a Day., Disp: 60 tablet, Rfl: 6  •  vitamin B-12 (CYANOCOBALAMIN) 1000 MCG tablet, Take  by mouth., Disp: , Rfl:   •  vitamin D (CHOLECALCIFEROL) 400 UNITS tablet, Take 400 Units by mouth daily., Disp: , Rfl:   •  SITagliptin-metFORMIN HCl ER (JANUMET XR)  MG tablet, Take 2 tablets by mouth Daily., Disp: 180 tablet, Rfl: 3  **I did not stop or change the above medications.  Patient's medication list was updated to reflect medications they have reported as currently taking, including medication changes made by other providers.    OBJECTIVE  There were no vitals filed for this visit.  There is no height or weight on file to calculate BMI.    Diagnostics:    Laboratory Results:         Lab Results   Component Value Date    WBC 9.9 11/18/2019    HGB 14.6 11/18/2019    HCT 43.8 11/18/2019    MCV 86 11/18/2019     11/18/2019     Lab Results   Component Value Date    GLUCOSE 178 (H) 01/02/2015    BUN 17 11/18/2019    CREATININE 0.94 11/18/2019    EGFRIFNONA 80 11/18/2019    EGFRIFAFRI 92 11/18/2019    BCR 18 11/18/2019    K 4.5 11/18/2019    CO2 26 11/18/2019    CALCIUM 9.4 11/18/2019    PROTENTOTREF 6.8 11/18/2019    ALBUMIN 3.8 11/18/2019    LABIL2 1.3 11/18/2019    AST 12 11/18/2019    ALT 13 11/18/2019     Lab Results   Component Value Date    HGBA1C 9.3 (H) 11/18/2019     Lab Results   Component Value Date    CHOL 154 07/24/2018     Lab Results   Component Value Date    HDL 52 11/18/2019    HDL 50 08/20/2019    HDL 64 (H) 07/24/2018     Lab Results   Component Value Date    LDL 88 11/18/2019    LDL 49 08/20/2019    LDL 67 07/24/2018     Lab Results   Component Value Date     TRIG 71 11/18/2019    TRIG 60 08/20/2019    TRIG 115 07/24/2018     No results found for: RPR  Lab Results   Component Value Date    TSH 1.190 05/01/2017     Lab Results   Component Value Date    [id number] 494 08/20/2019     The following exam is limited due to format of today's visit.    Exam:   GENERAL: Alert, and cooperative  Psychiatric: Normal mood and affect.  Neurologic: AO. Language normal. No aphasia. No dysarthria.     Impression:  Mr. Izaguirre presents for f/u via telephone for his cardioembolic LMCA infarct he suffered in 2014.  He also has significant cerebral atherosclerotic disease and has been maintained on Eliquis, ASA and Lipitor with no recurrent or new stroke/TIA symptoms.  We reviewed his vascular risk factors and importance for RF control for stroke prevention as well as the importance of continued smoking cessation.  Encouraged him to f/u with cardiology as he has not seen them in over a year. We reviewed the signs and symptoms of stroke and the importance of calling 911 if he were to develop these.  He will follow-up here in the office in 1 year, sooner symptoms warrant.    Plan:     Continue current medication regimen  F/U with PCP for continued cholesterol surveillance, LDL goal < 70  Encouraged to follow-up with cardiology as its been over a year  Monitor BP regularly, keep well-hydrated  Counseled on the importance of smoking cessation  Encouraged continued compliance with CPAP  Recommend regular physical activity, healthy diet  Secondary stroke prevention: Ideal targets for stroke prevention would be Blood pressure < 130/80; B12 > 500 TSH in normal range and LDL < 70; HbA1c < 6.5 and smoking cessation if applicable.  Call 911 for stroke symptoms  Follow-up in one year    I spent a total of 25 minutes today in reviewing records, prior diagnostics, examination of patient including, counseling and educating patient regarding signs/symptoms of stroke and reviewing diagnostics, stroke  prevention recommendations and discussion and documenting plan of care.    There are no diagnoses linked to this encounter.    Coding      Dictated using Dragon

## 2021-04-12 PROBLEM — N20.0 CALCULUS OF KIDNEY: Status: RESOLVED | Noted: 2017-03-17 | Resolved: 2021-01-01

## 2021-04-12 PROBLEM — Z12.11 COLON CANCER SCREENING: Status: ACTIVE | Noted: 2021-01-01

## 2021-04-12 PROBLEM — M25.561 ACUTE PAIN OF RIGHT KNEE: Status: RESOLVED | Noted: 2017-12-28 | Resolved: 2021-01-01

## 2021-04-12 PROBLEM — Z12.5 SPECIAL SCREENING FOR MALIGNANT NEOPLASM OF PROSTATE: Status: ACTIVE | Noted: 2021-01-01

## 2021-04-12 NOTE — PROGRESS NOTES
"Chief Complaint  Diabetes (Patient is wanting to establish primary care he is needing  his a1c checked ( wore mask and goggles) ) and Atrial Fibrillation (Patient is needing a referral for a new cardiologist because his retired )    Subjective          Jesus Izaguirre JrRakesh presents to [facility]  History of Present Illness  Patient of Dr. Souza who is transferring care to me today.  #1 diabetes-patient is currently on Janumet XR 50/1000 mg for his diabetes which she is taking daily as directed without any side effects.  His last A1c was checked in November 2019 with a result of 9.3.    #2 hypertension-patient is currently on amlodipine Benzapril 10/40 mg daily as directed without any side effects his blood pressure is slightly elevated in office today.  At home he states his bp is 135-140/80s.    #3 hyperlipidemia-patient is currently on atorvastatin 80 mg daily as directed any side effects.  His cholesterol was last checked #2019 within a normal limit    #4 A. fib-patient is currently on Eliquis 5 mg BID and Rythmol 150mg as directed and requesting referral to a new cardiologist because his cardiologist has retired.    #5. Venous stasis- currently on Lasix and Potassium for leg edema.  He is well controlled with this medication and no side effects at this time.  Legs are improved in the morning.  This is a long standing issue.     Objective   Vital Signs:   /88   Pulse 75   Temp 98 °F (36.7 °C)   Ht 180.3 cm (70.98\")   Wt 120 kg (265 lb 9.6 oz)   SpO2 95%   BMI 37.06 kg/m²     Physical Exam  Vitals reviewed.   Constitutional:       General: He is not in acute distress.     Appearance: He is well-developed.   HENT:      Head: Normocephalic.   Cardiovascular:      Rate and Rhythm: Normal rate and regular rhythm.      Heart sounds: Normal heart sounds.   Pulmonary:      Effort: Pulmonary effort is normal.      Breath sounds: Normal breath sounds.   Neurological:      Mental " Status: He is alert and oriented to person, place, and time.      Gait: Gait normal.   Psychiatric:         Behavior: Behavior normal.         Thought Content: Thought content normal.         Judgment: Judgment normal.        Result Review :                 Assessment and Plan    Diagnoses and all orders for this visit:    1. Type 2 diabetes mellitus with diabetic polyneuropathy, without long-term current use of insulin (CMS/HCC) (Primary)  -     POC Glycosylated Hemoglobin (Hb A1C)  -     Comprehensive Metabolic Panel  -     SITagliptin-metFORMIN HCl ER (Janumet XR)  MG tablet; Take 1 tablet by mouth 2 (two) times a day.  Dispense: 180 tablet; Refill: 3    2. Paroxysmal atrial fibrillation (CMS/HCC)  -     Ambulatory Referral to Cardiology    3. Essential hypertension    4. Mixed hyperlipidemia  -     Comprehensive Metabolic Panel  -     Lipid Panel With LDL / HDL Ratio    5. Special screening for malignant neoplasm of prostate  -     PSA Screen    6. Vitamin B12 deficiency  -     Vitamin B12 & Folate    7. Vitamin D deficiency  -     Vitamin D 25 Hydroxy    8. Screening for iron deficiency anemia  -     CBC & Differential    9. Venous stasis    10. Colon cancer screening  -     Ambulatory Referral For Screening Colonoscopy        Follow Up   No follow-ups on file.  Patient was given instructions and counseling regarding his condition or for health maintenance advice. Please see specific information pulled into the AVS if appropriate.     1. Increase Janumet to BID dosing. rto in 3 mons. Follow up after labs . Diet and weight loss, exercise   2. Continue same  3. Cont same recheck labs today  4. Referral to cardio  5. Cont same

## 2021-04-26 NOTE — TELEPHONE ENCOUNTER
S/w pt, he states when he saw Dr. Souza in 2019 for a wound on his leg, he was referred to [facility]. The treatment was not complete and so [facility] health started coming out and he states it still is not healed. I told pt it has been 2 years since he was seen for this so appt is needed to discuss wound with Jason. Appt sched for Fri

## 2021-04-30 NOTE — PROGRESS NOTES
"Chief Complaint  Wound Check (Patient has new insurnace and is needing a referral so that [facility] can still come to his home ( wore mask and goggles) )    Subjective          Jesus VAUGHN Dmitriy Ashton. presents to [facility]  History of Present Illness  Venous statis- pt has been using [facility] wound in his home for a while for an open wound on right lower front leg due to venous statis.  He switched insurance in April and they will not cover the treatment from [facility] without a referral for PCP.    Objective   Vital Signs:   /70   Pulse (!) 43   Temp 98 °F (36.7 °C)   Ht 180.3 cm (70.98\")   Wt 120 kg (265 lb)   SpO2 97%   BMI 36.98 kg/m²     Physical Exam  Vitals reviewed.   Constitutional:       General: He is not in acute distress.     Appearance: He is well-developed.   HENT:      Head: Normocephalic.   Cardiovascular:      Rate and Rhythm: Normal rate and regular rhythm.      Heart sounds: Normal heart sounds.   Pulmonary:      Effort: Pulmonary effort is normal.      Breath sounds: Normal breath sounds.   Neurological:      Mental Status: He is alert and oriented to person, place, and time.      Gait: Gait normal.   Psychiatric:         Behavior: Behavior normal.         Thought Content: Thought content normal.         Judgment: Judgment normal.        Result Review :                 Assessment and Plan    Diagnoses and all orders for this visit:    1. Venous stasis (Primary)  -     Ambulatory Referral to Home Health        Follow Up   No follow-ups on file.  Patient was given instructions and counseling regarding his condition or for health maintenance advice. Please see specific information pulled into the AVS if appropriate.     rto in 3 mons for DM, lipids and HTN check  referrla placed  Cont to keep wound clean and dressed    Mask and googles worn    "

## 2021-05-06 PROBLEM — I48.0 PAROXYSMAL ATRIAL FIBRILLATION: Status: ACTIVE | Noted: 2018-03-24

## 2021-05-06 NOTE — PROGRESS NOTES
[facility] New Patient Office Note     Encounter Date:21  Patient:Jesus Izaguirre Jr.  :1945  MRN:[id number]    Referring Provider: DULCE Hairston    Consulted for: Lucy with cardiologist    Chief Complaint:   Chief Complaint   Patient presents with   • Atrial Fibrillation   • Hypertension   • Hyperlipidemia     History of Presenting Illness:      Mr. Izaguirre is a 75 y.o. gentleman with past medical history notable for paroxysmal atrial fibrillation on propafenone and chronic anticoagulation, history of stroke, hypertension, mixed hyperlipidemia, morbid obesity, and ongoing tobacco abuse who presents her office to establish with a new cardiologist.  General from a cardiac perspective he is actually doing fairly well.  He really has no symptoms of his atrial fibrillation.  He denies any bleeding on his anticoagulation.  He has been on propafenone for a number of years and previously was following with another cardiologist who prescribed this Dr. Gomez.  He has not actually seen him for a number of years.  Denies any issues with this medication.      Review of Systems:  Review of Systems   Constitutional: Positive for malaise/fatigue and weight gain.   HENT: Negative.    Eyes: Negative.    Cardiovascular: Positive for dyspnea on exertion and leg swelling.   Respiratory: Positive for shortness of breath.    Endocrine: Negative.    Hematologic/Lymphatic: Negative.    Skin: Negative.    Musculoskeletal: Positive for joint pain.   Gastrointestinal: Negative.    Genitourinary: Negative.    Neurological: Negative.    Psychiatric/Behavioral: Negative.    Allergic/Immunologic: Negative.        Current Outpatient Medications on File Prior to Visit   Medication Sig Dispense Refill   • amLODIPine-benazepril (LOTREL) 10-40 MG per capsule TAKE ONE CAPSULE BY MOUTH DAILY 30 capsule 2   • apixaban (ELIQUIS) 5 MG tablet tablet Take 5 mg by mouth.     • aspirin 325 MG tablet Take 1 tablet by mouth  Daily. 90 tablet 3   • atorvastatin (LIPITOR) 80 MG tablet Take 1 tablet by mouth every night at bedtime. 90 tablet 0   • furosemide (LASIX) 40 MG tablet Take 2 tablets by mouth Daily. (Patient taking differently: Take 40 mg by mouth Daily.) 180 tablet 0   • glucose blood test strip Amber Contour Next Test In Vitro Strip; Patient Sig: Amber Contour Next Test In Vitro Strip TEST ONCE DAILY; 50; 3; 04-Mar-2015; Active 100 each 3   • naproxen sodium (ALEVE) 220 MG tablet Take 220 mg by mouth 2 (Two) Times a Day As Needed.     • SITagliptin-metFORMIN HCl ER (Janumet XR)  MG tablet Take 1 tablet by mouth 2 (two) times a day. 180 tablet 3   • vitamin B-12 (CYANOCOBALAMIN) 1000 MCG tablet Take  by mouth.     • vitamin D (ERGOCALCIFEROL) 1.25 MG (79944 UT) capsule capsule Take 1 capsule by mouth 1 (One) Time Per Week. 12 capsule 3   • [DISCONTINUED] propafenone (RYTHMOL) 150 MG tablet Take 1 tablet by mouth 2 (Two) Times a Day. 60 tablet 6   • [DISCONTINUED] vitamin D (CHOLECALCIFEROL) 400 UNITS tablet Take 400 Units by mouth daily.       No current facility-administered medications on file prior to visit.       No Known Allergies    Past Medical History:   Diagnosis Date   • Asthma    • BPH (benign prostatic hypertrophy)    • Class 2 obesity in adult 4/7/2019   • Diabetes mellitus (CMS/HCC)    • Fatigue    • HLD (hyperlipidemia) 7/5/2016   • Hypertension    • Nephrolithiasis    • Stroke (CMS/HCC) 12/2015   • Tobacco abuse 3/24/2018   • Type 2 diabetes mellitus with diabetic polyneuropathy, without long-term current use of insulin (CMS/[facility])    • Venous insufficiency        Past Surgical History:   Procedure Laterality Date   • LITHOTRIPSY      Renal       Social History     Socioeconomic History   • Marital status:      Spouse name: Not on file   • Number of children: Not on file   • Years of education: Not on file   • Highest education level: Not on file   Tobacco Use   • Smoking status: Current Every Day  "Smoker     Packs/day: 1.00   • Smokeless tobacco: Former User   • Tobacco comment: Cessation 12/2014; HX of 1 ppd for 52 yrs.   Vaping Use   • Vaping Use: Never used   Substance and Sexual Activity   • Alcohol use: No     Comment: caffeine use    • Drug use: No   • Sexual activity: Defer       Family History   Problem Relation Age of Onset   • Arthritis Mother    • Cancer Mother    • Migraines Mother    • Hypertension Other        The following portions of the patient's history were reviewed and updated as appropriate: allergies, current medications, past family history, past medical history, past social history, past surgical history and problem list.       Objective:       Vitals:    05/06/21 1307   BP: 130/80   BP Location: Left arm   Patient Position: Sitting   Pulse: 86   Weight: 121 kg (267 lb)   Height: 180.3 cm (70.98\")       Physical Exam:  Constitutional: Well appearing, well developed, no acute distress   HENT: Oropharynx clear and membrane moist  Eyes: Normal conjunctiva, no sclera icterus.  Neck: Supple, no carotid bruit bilaterally.  Cardiovascular: Regular rate and rhythm, No Murmur, 1+ bilateral lower extremity edema.  Pulmonary: Normal respiratory effort, normal lung sounds, no wheezing.  Abdominal: Soft, nontender, no hepatosplenomegaly, liver is non-pulsatile.  Neurological: Alert and orient x 3.   Skin: Warm, dry, no ecchymosis, no rash.  Kareem appearance to the ankles  Psych: Appropriate mood and affect. Normal judgment and insight.      Lab Results   Component Value Date     04/12/2021     11/18/2019    K 4.7 04/12/2021    K 4.5 11/18/2019     04/12/2021     11/18/2019    CO2 27.4 04/12/2021    CO2 26 11/18/2019    BUN 21 04/12/2021    BUN 17 11/18/2019    CREATININE 1.12 04/12/2021    CREATININE 0.94 11/18/2019    EGFRIFNONA 64 04/12/2021    EGFRIFNONA 80 11/18/2019    EGFRIFAFRI 77 04/12/2021    EGFRIFAFRI 92 11/18/2019    GLUCOSE 178 (H) 01/02/2015    GLUCOSE 133 (H) " 01/01/2015    CALCIUM 9.8 04/12/2021    CALCIUM 9.4 11/18/2019    PROTENTOTREF 6.6 04/12/2021    PROTENTOTREF 6.8 11/18/2019    ALBUMIN 3.90 04/12/2021    ALBUMIN 3.8 11/18/2019    BILITOT 0.3 04/12/2021    BILITOT 0.2 11/18/2019    AST 20 04/12/2021    AST 12 11/18/2019    ALT 23 04/12/2021    ALT 13 11/18/2019     Lab Results   Component Value Date    WBC 8.24 04/12/2021    WBC 9.9 11/18/2019    HGB 16.3 04/12/2021    HGB 14.6 11/18/2019    HCT 47.1 04/12/2021    HCT 43.8 11/18/2019    MCV 85.2 04/12/2021    MCV 86 11/18/2019     04/12/2021     11/18/2019     Lab Results   Component Value Date    CHOL 154 07/24/2018    TRIG 56 04/12/2021    TRIG 71 11/18/2019    HDL 49 04/12/2021    HDL 52 11/18/2019    LDL 53 04/12/2021    LDL 88 11/18/2019     No results found for: PROBNP, BNP  No results found for: CKTOTAL, CKMB, CKMBINDEX, TROPONINI, TROPONINT  Lab Results   Component Value Date    TSH 1.190 05/01/2017    TSH 1.580 08/22/2016         ECG 12 Lead    Date/Time: 5/6/2021 2:16 PM  Performed by: Paco Hudson MD  Authorized by: Paco Hudson MD   Comparison: compared with previous ECG from 3/9/2017  Comparison to previous ECG: Wandering pacemaker and PVCs are new  Rhythm comments: Wandering pacemaker  Ectopy: unifocal PVCs  Conduction: right bundle 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   · The test was done pharmacologically as the patient was unable to walk. After injection of 0.4 mg IV of Lexiscan, heart rate changed from 54 to 94 beats a minute while blood pressure changed from 120/80 to 180/80.   · Rare PVC after injection of Lexiscan.   · No ST-T wave changes of ischemia.     Echocardiogram 12/26/2014:  · The left ventricular chamber size is normal. Mild concentric left ventricular hypertrophy is  observed. The estimated ejection fraction is 55-60%.   · The LV diastolic filling pattern is consistent with Grade 2 (pseudonormalization). The left ventricular diastolic filling pattern is consistent with elevated mean left atrial pressure.  · The left atrium is mildly dilated.  · There is no pericardial effusion.          Assessment:          Diagnosis Plan   1. Paroxysmal atrial fibrillation (CMS/HCC)  Adult Transthoracic Echo Complete W/ Cont if Necessary Per Protocol    ECG 12 Lead   2. Essential hypertension     3. Mixed hyperlipidemia     4. Tobacco abuse            Plan:       Mr. Izaguirre is a 75 y.o. gentleman with past medical history notable for paroxysmal atrial fibrillation on propafenone and chronic anticoagulation, history of stroke, hypertension, mixed hyperlipidemia, morbid obesity, and ongoing tobacco abuse who presents her office to establish with a new cardiologist.  Overall from a cardiac perspective he is doing reasonably well.  He denies any symptoms of recurrent atrial fibrillation nor any issues with his anticoagulation.  Unfortunately he does have a wide QRS at baseline with a right bundle branch block and left anterior fascicular block.  Furthermore he is having frequent PVCs.  Both of these combined makes the use of propafenone very high risk especially in light that he is not on any rate controlling agents.  I recommend that we stop propafenone at this point given the risk profile.  I will start a low-dose atenolol to see if we can help manage his atrial arrhythmia.  I would recommend continuing anticoagulation.  He is currently on a full dose aspirin did talk to him that he does not need to be on aspirin but he is using this for joint pain.  Otherwise given it has been a number of years since he has had an echocardiogram as well as with progression of his dysrhythmia I would recommend getting a repeat echocardiogram.    Paroxysmal defibrillation:  · Follow-up echocardiogram  · Stop  propafenone given wide QRS  · Start atenolol  · Continue anticoagulation    Hypertension:  · Blood pressure well controlled continue the current medical therapy    Mixed Hyperlipidemia:  · Continue satin therapy  · Last lipid panel 4/2021 demonstrates good control of total cholesterol and LDL  · CMP 4/2021 demonstrates normal ALT and AST    Tobacco abuse:  · Patient understood in quitting smoking at this time      Follow-up:  6 months      Thank you for allowing me to participate in the care of Jesus Izaguirre Jr.. Feel free to contact me directly with any further questions or concerns.    Paco Hudson MD  [facility]  05/06/21  14:21 EDT

## 2021-05-20 NOTE — TELEPHONE ENCOUNTER
Patient left voicemail returning your phone call about his echo results.    He can be reached at [phone] or [phone] thanks

## 2021-06-01 NOTE — TELEPHONE ENCOUNTER
Caller: Jesus Izaguirre Jr.    Relationship: Self    Best call back number: [phone]    Medication needed:   Requested Prescriptions     Pending Prescriptions Disp Refills   • amLODIPine-benazepril (LOTREL) 10-40 MG per capsule 30 capsule 2     Sig: Take 1 capsule by mouth Daily.       When do you need the refill by: ASAP      Does the patient have less than a 3 day supply:  [] Yes  [x] No    What is the patient's preferred pharmacy: [facility] & ([facility] [phone] [facility] [phone] FX

## 2021-07-12 NOTE — PROGRESS NOTES
"Chief Complaint  Diabetes (Patient is needing his a1c checked ( wore mask and goggles) )    Subjective          Jesus Izaguirre Jr. presents to [facility]  History of Present Illness  1. DM-patient currently on Janumet XR 50/1000 mg twice a day as directed without any side effects.    2. HTN-patient is currently on amlodipine Benzapril 10/40 mg daily as directed any side effects.  Blood pressure well controlled in office today.    3. Hyperlipidemia-currently on atorvastatin 80 mg daily as directed any side effects.  Cholesterol was last checked in April 2021 with a total cholesterol of 115 and an LDL of 53    4.. afib- managed by cardiology and on atenolol 25 mg daily as directed without any side effects      Objective   Vital Signs:   /72   Pulse 61   Temp 97.1 °F (36.2 °C)   Ht 180.3 cm (70.98\")   Wt 121 kg (265 lb 12.8 oz)   SpO2 97%   BMI 37.09 kg/m²     Physical Exam  Vitals reviewed.   Constitutional:       General: He is not in acute distress.     Appearance: He is well-developed.   HENT:      Head: Normocephalic.   Cardiovascular:      Rate and Rhythm: Normal rate and regular rhythm.      Heart sounds: Normal heart sounds.   Pulmonary:      Effort: Pulmonary effort is normal.      Breath sounds: Normal breath sounds.   Neurological:      Mental Status: He is alert and oriented to person, place, and time.      Gait: Gait normal.   Psychiatric:         Behavior: Behavior normal.         Thought Content: Thought content normal.         Judgment: Judgment normal.        Result Review :   The following data was reviewed by: DULCE Hairston on 07/12/2021:  CMP    CMP 4/12/21   Glucose 145 (A)   BUN 21   Creatinine 1.12   eGFR Non  Am 64   eGFR African Am 77   Sodium 140   Potassium 4.7   Chloride 103   Calcium 9.8   Total Protein 6.6   Albumin 3.90   Globulin 2.7   Total Bilirubin 0.3   Alkaline Phosphatase 103   AST (SGOT) 20   ALT (SGPT) 23   (A) Abnormal value "       Comments are available for some flowsheets but are not being displayed.           CBC w/diff    CBC w/Diff 4/12/21   WBC 8.24   RBC 5.53   Hemoglobin 16.3   Hematocrit 47.1   MCV 85.2   MCH 29.5   MCHC 34.6   RDW 12.7   Platelets 240   Neutrophil Rel % 61.2   Lymphocyte Rel % 23.4   Monocyte Rel % 10.6   Eosinophil Rel % 3.4   Basophil Rel % 0.8           Lipid Panel    Lipid Panel 4/12/21   Total Cholesterol 115   Triglycerides 56   HDL Cholesterol 49   VLDL Cholesterol 13   LDL Cholesterol  53   LDL/HDL Ratio 1.12      Comments are available for some flowsheets but are not being displayed.           PSA    PSA 4/12/21   PSA 2.260                     Assessment and Plan    Diagnoses and all orders for this visit:    1. Type 2 diabetes mellitus with diabetic polyneuropathy, without long-term current use of insulin (CMS/[facility]) (Primary)  -     POC Glycosylated Hemoglobin (Hb A1C)  -     Semaglutide,0.25 or 0.5MG/DOS, (Ozempic, 0.25 or 0.5 MG/DOSE,) 2 MG/1.5ML solution pen-injector; Inject 0.25 mg under the skin into the appropriate area as directed 1 (One) Time Per Week.  Dispense: 3 mL; Refill: 3    2. Mixed hyperlipidemia    3. Essential hypertension        Follow Up   Return in about 3 months (around 10/12/2021) for Recheck.  Patient was given instructions and counseling regarding his condition or for health maintenance advice. Please see specific information pulled into the AVS if appropriate.     Continue same with all medication but needs to add Ozempic 0.25 mg once a week return to the office 3 months for an A1c check    Mask and googles worn

## 2021-07-30 NOTE — TELEPHONE ENCOUNTER
Caller: Jesus Izaguirre Jr.    Relationship: Self    Best call back number: [phone]    Medication needed:   Semaglutide,0.25 or 0.5MG/DOS, (Ozempic, 0.25 or 0.5 MG/DOSE,) 2 MG/1.5ML solution pen-injector    When do you need the refill by: BY MONDAY    What additional details did the patient provide when requesting the medication: [facility] NEEDS A PRIOR AUTHORIZATION FOR THIS MEDICATION.    Does the patient have less than a 3 day supply:  [x] Yes  [] No    What is the patient's preferred pharmacy: [facility] [address] AT [facility] & ([facility] [phone] [facility] [phone] FX

## 2021-07-30 NOTE — TELEPHONE ENCOUNTER
The patient currently has access to the requested medication and a Prior Authorization is not needed for the patient/medication, this is the respond from cover my meds to his PA request

## 2021-10-11 NOTE — PROGRESS NOTES
"Chief Complaint  Diabetes (3 months check up)    Subjective          Jesus Izaguirre Jr. presents to [facility]  History of Present Illness  Diabetes-patient is currently on  Janumet XR 50/1000 mg 2 times a day which he is taking as directed without any side effects. He was suppose to start Ozempic but didn't bc of cost.  A1c was last checked in July 2021 with result of 8.9.  Today it is 8.3.    Hyperlipidemia-patient is currently on atorvastatin 80 mg daily as directed without any side effects.  Nehemiah was last checked in April 2021 with a total cholesterol of 115 and an LDL of 83    Hypertension-patient is currently on Lotrel 10/40 mg daily, atenolol 25 mg daily as directed by side effects.  Blood pressures well controlled in the office today.      Objective   Vital Signs:   /74   Pulse 84   Temp 97.7 °F (36.5 °C)   Ht 180.3 cm (71\")   Wt 119 kg (262 lb)   SpO2 96%   BMI 36.54 kg/m²     Physical Exam  Vitals reviewed.   Constitutional:       General: He is not in acute distress.     Appearance: He is well-developed.   HENT:      Head: Normocephalic.   Cardiovascular:      Rate and Rhythm: Normal rate and regular rhythm.      Heart sounds: Normal heart sounds.   Pulmonary:      Effort: Pulmonary effort is normal.      Breath sounds: Normal breath sounds.   Neurological:      Mental Status: He is alert and oriented to person, place, and time.      Gait: Gait normal.   Psychiatric:         Behavior: Behavior normal.         Thought Content: Thought content normal.         Judgment: Judgment normal.        Result Review :   The following data was reviewed by: DULCE Hairston on 10/11/2021:  CMP    CMP 4/12/21   Glucose 145 (A)   BUN 21   Creatinine 1.12   eGFR Non  Am 64   eGFR African Am 77   Sodium 140   Potassium 4.7   Chloride 103   Calcium 9.8   Total Protein 6.6   Albumin 3.90   Globulin 2.7   Total Bilirubin 0.3   Alkaline Phosphatase 103   AST (SGOT) 20   ALT " (SGPT) 23   (A) Abnormal value       Comments are available for some flowsheets but are not being displayed.           CBC w/diff    CBC w/Diff 4/12/21   WBC 8.24   RBC 5.53   Hemoglobin 16.3   Hematocrit 47.1   MCV 85.2   MCH 29.5   MCHC 34.6   RDW 12.7   Platelets 240   Neutrophil Rel % 61.2   Lymphocyte Rel % 23.4   Monocyte Rel % 10.6   Eosinophil Rel % 3.4   Basophil Rel % 0.8           Lipid Panel    Lipid Panel 4/12/21   Total Cholesterol 115   Triglycerides 56   HDL Cholesterol 49   VLDL Cholesterol 13   LDL Cholesterol  53   LDL/HDL Ratio 1.12      Comments are available for some flowsheets but are not being displayed.               Most Recent A1C    HGBA1C Most Recent 10/11/21   Hemoglobin A1C 8.3           PSA    PSA 4/12/21   PSA 2.260                     Assessment and Plan    Diagnoses and all orders for this visit:    1. Type 2 diabetes mellitus with diabetic polyneuropathy, without long-term current use of insulin (HCC) (Primary)  -     POC Glycosylated Hemoglobin (Hb A1C)  -     Dapagliflozin Propanediol (Farxiga) 10 MG tablet; Take 10 mg by mouth Daily.  Dispense: 90 tablet; Refill: 3  -     MicroAlbumin, Urine, Random - Urine, Clean Catch    2. Mixed hyperlipidemia  -     atorvastatin (LIPITOR) 80 MG tablet; Take 1 tablet by mouth every night at bedtime.  Dispense: 90 tablet; Refill: 3    3. Primary hypertension        Follow Up   Return in about 3 months (around 1/11/2022) for Recheck.  Patient was given instructions and counseling regarding his condition or for health maintenance advice. Please see specific information pulled into the AVS if appropriate.     Follow up after microalbumin  Come fasting to next visit for labs  Call if farxiga is too expensive    Mask and googles worn

## 2021-11-04 PROBLEM — L03.311 CELLULITIS OF ABDOMINAL WALL: Status: ACTIVE | Noted: 2021-01-01

## 2021-11-04 NOTE — PROGRESS NOTES
"Chief Complaint  Rash (Patient said a week ago he noticed a rash on his stomach ( wore mask and goggles) ) and Shortness of Breath (Patient is wanting to see about getting an inhaler )    Subjective          Jesus Izaguirre Jr. presents to [facility]  History of Present Illness  Patient presenting with a large rash extending over his entire abdomen that started 1 week ago.  No itchiness no pain came on very suddenly.  He is not use any creams on it.  No new medications no changes at all to anything that he has been doing.  Please see media tab for photographs of the rash.    Patient last saw his cardiologist in May his medications were changed due to his A. fib.  He has chronic A. fib and never has had any symptoms of it.  He but he has been experiencing some shortness of breath with exertion for the past few weeks. No chest pain. Taking meds as directed.  SOB only lasted for a few seconds.  Stops when sits down     Objective   Vital Signs:   /80   Pulse 56   Temp 96.9 °F (36.1 °C)   Ht 180.3 cm (70.98\")   Wt 118 kg (260 lb)   SpO2 91%   BMI 36.28 kg/m²     Physical Exam  Vitals reviewed.   Constitutional:       General: He is not in acute distress.     Appearance: He is well-developed.   HENT:      Head: Normocephalic.   Cardiovascular:      Rate and Rhythm: Normal rate and regular rhythm.      Heart sounds: Normal heart sounds.   Pulmonary:      Effort: Pulmonary effort is normal.      Breath sounds: Normal breath sounds.   Neurological:      Mental Status: He is alert and oriented to person, place, and time.      Gait: Gait normal.   Psychiatric:         Behavior: Behavior normal.         Thought Content: Thought content normal.         Judgment: Judgment normal.        Result Review :   The following data was reviewed by: DULCE Hairston on 11/04/2021:  CMP    CMP 4/12/21   Glucose 145 (A)   BUN 21   Creatinine 1.12   eGFR Non  Am 64   eGFR  Am 77 "   Sodium 140   Potassium 4.7   Chloride 103   Calcium 9.8   Total Protein 6.6   Albumin 3.90   Globulin 2.7   Total Bilirubin 0.3   Alkaline Phosphatase 103   AST (SGOT) 20   ALT (SGPT) 23   (A) Abnormal value       Comments are available for some flowsheets but are not being displayed.           CBC w/diff    CBC w/Diff 4/12/21   WBC 8.24   RBC 5.53   Hemoglobin 16.3   Hematocrit 47.1   MCV 85.2   MCH 29.5   MCHC 34.6   RDW 12.7   Platelets 240   Neutrophil Rel % 61.2   Lymphocyte Rel % 23.4   Monocyte Rel % 10.6   Eosinophil Rel % 3.4   Basophil Rel % 0.8           Lipid Panel    Lipid Panel 4/12/21   Total Cholesterol 115   Triglycerides 56   HDL Cholesterol 49   VLDL Cholesterol 13   LDL Cholesterol  53   LDL/HDL Ratio 1.12      Comments are available for some flowsheets but are not being displayed.               Most Recent A1C    HGBA1C Most Recent 10/11/21   Hemoglobin A1C 8.3           PSA    PSA 4/12/21   PSA 2.260                ECG 12 Lead    Date/Time: 11/4/2021 11:33 AM  Performed by: Jason Joay APRN  Authorized by: Jason Joya APRN   Comparison: compared with previous ECG from 5/6/2021  Comparison to previous ECG: Minimal changes   Rhythm: sinus arrhythmia  Ectopy: unifocal PVCs  Rate: normal  Conduction: right bundle branch block and left anterior fascicular block  ST Segments: ST segments normal  T Waves: T waves normal  Other findings: prolonged QTc interval    Clinical impression: abnormal EKG              Assessment and Plan    Diagnoses and all orders for this visit:    1. Cellulitis of abdominal wall (Primary)  -     sulfamethoxazole-trimethoprim (Bactrim DS) 800-160 MG per tablet; Take 1 tablet by mouth 2 (Two) Times a Day.  Dispense: 20 tablet; Refill: 0    2. Longstanding persistent atrial fibrillation (HCC)  -     ECG 12 Lead  -     Comprehensive Metabolic Panel    3. Shortness of breath  -     ECG 12 Lead  -     Comprehensive Metabolic Panel        Follow Up   Return in about 1 week  (around 11/11/2021) for Recheck.  Patient was given instructions and counseling regarding his condition or for health maintenance advice. Please see specific information pulled into the AVS if appropriate.     Appointment on Monday with cardiologist  If worsening s/s go to ER immediately  rto in 1 week for rash    Mask and googles worn

## 2021-12-22 NOTE — PROGRESS NOTES
[facility] New Patient Office Note     Encounter Date:21  Patient:Jesus Izaguirre Jr.  :1945  MRN:[id number]      Chief Complaint:   Chief Complaint   Patient presents with   • PAF     6 month f/u     History of Presenting Illness:      Mr. Izaguirre is a 76 y.o. gentleman with past medical history notable for paroxysmal atrial fibrillation on propafenone and chronic anticoagulation, history of stroke, hypertension, mixed hyperlipidemia, morbid obesity, and ongoing tobacco abuse who presents for scheduled follow up.  At her last visit we did stop his propafenone as he was noted to have a wide QRS.  We did switch him over to atenolol and overall seems to be doing fairly well.  Blood pressure is little high today and has been borderline in the past.    Overall he is actually feeling a little bit better than he has in recent past.  He feels like he is breathing little bit better.  Fortunately he did quit smoking.      Review of Systems:  Review of Systems   Constitutional: Positive for malaise/fatigue.   HENT: Negative.    Eyes: Negative.    Cardiovascular: Positive for dyspnea on exertion.   Respiratory: Positive for shortness of breath.    Endocrine: Negative.    Hematologic/Lymphatic: Negative.    Skin: Negative.    Musculoskeletal: Positive for joint pain.   Gastrointestinal: Negative.    Genitourinary: Negative.    Neurological: Negative.    Psychiatric/Behavioral: Negative.    Allergic/Immunologic: Negative.        Current Outpatient Medications on File Prior to Visit   Medication Sig Dispense Refill   • amLODIPine-benazepril (LOTREL) 10-40 MG per capsule Take 1 capsule by mouth Daily. 90 capsule 3   • apixaban (ELIQUIS) 5 MG tablet tablet Take 5 mg by mouth 1 (One) Time.     • aspirin 325 MG tablet Take 1 tablet by mouth Daily. (Patient taking differently: Take 325 mg by mouth 3 (Three) Times a Day.) 90 tablet 3   • atorvastatin (LIPITOR) 80 MG tablet Take 1 tablet by mouth every night at  bedtime. 90 tablet 3   • Dapagliflozin Propanediol (Farxiga) 10 MG tablet Take 10 mg by mouth Daily. 90 tablet 3   • furosemide (LASIX) 40 MG tablet Take 2 tablets by mouth Daily. (Patient taking differently: Take 40 mg by mouth Daily.) 180 tablet 0   • glucose blood test strip Amber Contour Next Test In Vitro Strip; Patient Sig: Amber Contour Next Test In Vitro Strip TEST ONCE DAILY; 50; 3; 04-Mar-2015; Active 100 each 3   • naproxen sodium (ALEVE) 220 MG tablet Take 220 mg by mouth 2 (Two) Times a Day As Needed.     • SITagliptin-metFORMIN HCl ER (Janumet XR)  MG tablet Take 1 tablet by mouth 2 (two) times a day. 180 tablet 3   • sulfamethoxazole-trimethoprim (Bactrim DS) 800-160 MG per tablet Take 1 tablet by mouth 2 (Two) Times a Day. 20 tablet 0   • vitamin B-12 (CYANOCOBALAMIN) 1000 MCG tablet Take  by mouth.     • vitamin D (ERGOCALCIFEROL) 1.25 MG (82468 UT) capsule capsule Take 1 capsule by mouth 1 (One) Time Per Week. 12 capsule 3   • [DISCONTINUED] atenolol (TENORMIN) 25 MG tablet Take 1 tablet by mouth Daily. 90 tablet 3     No current facility-administered medications on file prior to visit.       No Known Allergies    Past Medical History:   Diagnosis Date   • Asthma    • BPH (benign prostatic hypertrophy)    • Class 2 obesity in adult 2019   • Diabetes mellitus (HCC)    • Fatigue    • HLD (hyperlipidemia) 2016   • Hypertension    • Nephrolithiasis    • Stroke (HCC) 2015   • Tobacco abuse 3/24/2018   • Type 2 diabetes mellitus with diabetic polyneuropathy, without long-term current use of insulin (HCC)    • Venous insufficiency        Past Surgical History:   Procedure Laterality Date   • LITHOTRIPSY      Renal       Social History     Socioeconomic History   • Marital status:    Tobacco Use   • Smoking status: Former Smoker     Packs/day: 1.00     Quit date:      Years since quittin.9   • Smokeless tobacco: Former User   • Tobacco comment: Cessation 2014; HX of 1  "ppd for 52 yrs.   Vaping Use   • Vaping Use: Never used   Substance and Sexual Activity   • Alcohol use: No     Comment: caffeine use    • Drug use: No   • Sexual activity: Defer       Family History   Problem Relation Age of Onset   • Arthritis Mother    • Cancer Mother    • Migraines Mother    • Hypertension Other        The following portions of the patient's history were reviewed and updated as appropriate: allergies, current medications, past family history, past medical history, past social history, past surgical history and problem list.       Objective:       Vitals:    12/22/21 1152   BP: 150/72   BP Location: Left arm   Patient Position: Sitting   Pulse: 86   Weight: 115 kg (254 lb 3.2 oz)   Height: 180.3 cm (70.98\")     Body mass index is 35.47 kg/m².     Physical Exam:  Constitutional: Well appearing, well developed, no acute distress   HENT: Oropharynx clear and membrane moist  Eyes: Normal conjunctiva, no sclera icterus.  Neck: Supple, no carotid bruit bilaterally.  Cardiovascular: Regular rate and rhythm, No Murmur, 1+ bilateral lower extremity edema.  Pulmonary: Normal respiratory effort, normal lung sounds, no wheezing.  Abdominal: Soft, nontender, no hepatosplenomegaly, liver is non-pulsatile.  Neurological: Alert and orient x 3.   Skin: Warm, dry, no ecchymosis, no rash.  Kareem appearance to the ankles  Psych: Appropriate mood and affect. Normal judgment and insight.      Lab Results   Component Value Date     04/12/2021     11/18/2019    K 4.7 04/12/2021    K 4.5 11/18/2019     04/12/2021     11/18/2019    CO2 27.4 04/12/2021    CO2 26 11/18/2019    BUN 21 04/12/2021    BUN 17 11/18/2019    CREATININE 1.12 04/12/2021    CREATININE 0.94 11/18/2019    EGFRIFNONA 64 04/12/2021    EGFRIFNONA 80 11/18/2019    EGFRIFAFRI 77 04/12/2021    EGFRIFAFRI 92 11/18/2019    GLUCOSE 145 (H) 04/12/2021    GLUCOSE 202 (H) 11/18/2019    CALCIUM 9.8 04/12/2021    CALCIUM 9.4 11/18/2019    " PROTENTOTREF 6.6 04/12/2021    PROTENTOTREF 6.8 11/18/2019    ALBUMIN 3.90 04/12/2021    ALBUMIN 3.8 11/18/2019    BILITOT 0.3 04/12/2021    BILITOT 0.2 11/18/2019    AST 20 04/12/2021    AST 12 11/18/2019    ALT 23 04/12/2021    ALT 13 11/18/2019     Lab Results   Component Value Date    WBC 8.24 04/12/2021    WBC 9.9 11/18/2019    HGB 16.3 04/12/2021    HGB 14.6 11/18/2019    HCT 47.1 04/12/2021    HCT 43.8 11/18/2019    MCV 85.2 04/12/2021    MCV 86 11/18/2019     04/12/2021     11/18/2019     Lab Results   Component Value Date    CHOL 154 07/24/2018    TRIG 56 04/12/2021    TRIG 71 11/18/2019    HDL 49 04/12/2021    HDL 52 11/18/2019    LDL 53 04/12/2021    LDL 88 11/18/2019     No results found for: PROBNP, BNP  No results found for: CKTOTAL, CKMB, CKMBINDEX, TROPONINI, TROPONINT  Lab Results   Component Value Date    TSH 1.190 05/01/2017    TSH 1.580 08/22/2016         ECG 12 Lead    Date/Time: 12/22/2021 12:13 PM  Performed by: Paco Hudson MD  Authorized by: Paco Hudson MD   Comparison: compared with previous ECG from 11/4/2021  Similar to previous ECG  Rhythm: sinus rhythm  Ectopy: atrial premature contractions  Conduction: right bundle branch block and left anterior fascicular block        Echocardiogram 5/19/2021 with images reviewed by myself:  · Calculated left ventricular EF = 50.2% Estimated left ventricular EF was in agreement with the calculated left ventricular EF. Left ventricular systolic function is low normal. Normal left ventricular cavity size noted. Left ventricular wall thickness is consistent with moderate concentric hypertrophy. Left ventricular diastolic function is consistent with (grade II w/high LAP) pseudonormalization.  · The left atrial cavity is mildly dilated.    Stress MPI 2/6/2015:  · Indications for stress test: Hypertension, shortness of breath, hyperlipidemia and right bundle branch block.   · The test was done pharmacologically as the patient was  unable to walk. After injection of 0.4 mg IV of Lexiscan, heart rate changed from 54 to 94 beats a minute while blood pressure changed from 120/80 to 180/80.   · Rare PVC after injection of Lexiscan.   · No ST-T wave changes of ischemia.     Echocardiogram 12/26/2014:  · The left ventricular chamber size is normal. Mild concentric left ventricular hypertrophy is observed. The estimated ejection fraction is 55-60%.   · The LV diastolic filling pattern is consistent with Grade 2 (pseudonormalization). The left ventricular diastolic filling pattern is consistent with elevated mean left atrial pressure.  · The left atrium is mildly dilated.  · There is no pericardial effusion.          Assessment:          Diagnosis Plan   1. Paroxysmal atrial fibrillation (HCC)  ECG 12 Lead   2. Primary hypertension     3. Mixed hyperlipidemia     4. Tobacco abuse            Plan:       Mr. Izaguirre is a 76 y.o. gentleman with past medical history notable for paroxysmal atrial fibrillation on propafenone and chronic anticoagulation, history of stroke, hypertension, mixed hyperlipidemia, morbid obesity, and ongoing tobacco abuse who presents for scheduled follow up.  Overall patient doing well but would like to get little bit better blood pressure controlled we will increase atenolol from 25 mg up to 50 mg.    Paroxysmal Atrial fibrillation:  · Previously on propafenone but was stopped due to wide QRS  · Continue atenolol  · Continue anticoagulation    Hypertension:  · Blood pressure will increase atenolol from 25 to 50 mg and monitor response    Mixed Hyperlipidemia:  · Continue satin therapy  · Last lipid panel 4/2021 demonstrates good control of total cholesterol and LDL  · CMP 4/2021 demonstrates normal ALT and AST    Tobacco abuse:  · Patient quit since last visit      Follow-up:  6 months      Thank you for allowing me to participate in the care of Jesus Izaguirre . Feel free to contact me directly with any further questions or  concerns.    Paco Hudson MD  [facility]  12/22/21  12:14 EST

## 2022-01-01 ENCOUNTER — APPOINTMENT (OUTPATIENT)
Dept: WOUND CARE | Facility: HOSPITAL | Age: 77
End: 2022-01-01

## 2022-01-01 ENCOUNTER — HOME CARE VISIT (OUTPATIENT)
Dept: HOME HEALTH SERVICES | Facility: HOME HEALTHCARE | Age: 77
End: 2022-01-01

## 2022-01-01 ENCOUNTER — APPOINTMENT (OUTPATIENT)
Dept: CT IMAGING | Facility: HOSPITAL | Age: 77
End: 2022-01-01

## 2022-01-01 ENCOUNTER — TRANSCRIBE ORDERS (OUTPATIENT)
Dept: HOME HEALTH SERVICES | Facility: HOME HEALTHCARE | Age: 77
End: 2022-01-01

## 2022-01-01 ENCOUNTER — HOME HEALTH ADMISSION (OUTPATIENT)
Dept: HOME HEALTH SERVICES | Facility: HOME HEALTHCARE | Age: 77
End: 2022-01-01

## 2022-01-01 ENCOUNTER — ANESTHESIA (OUTPATIENT)
Dept: CARDIOVASCULAR ICU | Facility: HOSPITAL | Age: 77
End: 2022-01-01

## 2022-01-01 ENCOUNTER — APPOINTMENT (OUTPATIENT)
Dept: GENERAL RADIOLOGY | Facility: HOSPITAL | Age: 77
End: 2022-01-01

## 2022-01-01 ENCOUNTER — HOSPITAL ENCOUNTER (INPATIENT)
Facility: HOSPITAL | Age: 77
LOS: 8 days | Discharge: NURSING, REHAB OR LONG-TERM CARE | End: 2022-01-24
Attending: EMERGENCY MEDICINE | Admitting: INTERNAL MEDICINE

## 2022-01-01 ENCOUNTER — APPOINTMENT (OUTPATIENT)
Dept: CARDIOLOGY | Facility: HOSPITAL | Age: 77
End: 2022-01-01

## 2022-01-01 ENCOUNTER — TELEPHONE (OUTPATIENT)
Dept: FAMILY MEDICINE CLINIC | Facility: CLINIC | Age: 77
End: 2022-01-01

## 2022-01-01 ENCOUNTER — ANESTHESIA EVENT (OUTPATIENT)
Dept: CARDIOVASCULAR ICU | Facility: HOSPITAL | Age: 77
End: 2022-01-01

## 2022-01-01 ENCOUNTER — HOSPITAL ENCOUNTER (INPATIENT)
Facility: HOSPITAL | Age: 77
LOS: 3 days | End: 2022-02-17
Attending: EMERGENCY MEDICINE | Admitting: INTERNAL MEDICINE

## 2022-01-01 VITALS
DIASTOLIC BLOOD PRESSURE: 80 MMHG | HEART RATE: 65 BPM | OXYGEN SATURATION: 96 % | SYSTOLIC BLOOD PRESSURE: 132 MMHG | TEMPERATURE: 95.9 F | RESPIRATION RATE: 21 BRPM

## 2022-01-01 VITALS
RESPIRATION RATE: 18 BRPM | SYSTOLIC BLOOD PRESSURE: 142 MMHG | DIASTOLIC BLOOD PRESSURE: 100 MMHG | OXYGEN SATURATION: 98 % | TEMPERATURE: 97.3 F | HEART RATE: 66 BPM

## 2022-01-01 VITALS
RESPIRATION RATE: 18 BRPM | DIASTOLIC BLOOD PRESSURE: 82 MMHG | TEMPERATURE: 97.9 F | OXYGEN SATURATION: 94 % | HEART RATE: 72 BPM | SYSTOLIC BLOOD PRESSURE: 128 MMHG

## 2022-01-01 VITALS
TEMPERATURE: 98.3 F | DIASTOLIC BLOOD PRESSURE: 30 MMHG | HEIGHT: 65 IN | OXYGEN SATURATION: 95 % | RESPIRATION RATE: 30 BRPM | SYSTOLIC BLOOD PRESSURE: 48 MMHG | BODY MASS INDEX: 42.4 KG/M2 | WEIGHT: 254.5 LBS

## 2022-01-01 VITALS
DIASTOLIC BLOOD PRESSURE: 70 MMHG | HEART RATE: 21 BPM | SYSTOLIC BLOOD PRESSURE: 122 MMHG | OXYGEN SATURATION: 96 % | RESPIRATION RATE: 21 BRPM | TEMPERATURE: 97.5 F

## 2022-01-01 VITALS
RESPIRATION RATE: 18 BRPM | SYSTOLIC BLOOD PRESSURE: 133 MMHG | HEART RATE: 62 BPM | DIASTOLIC BLOOD PRESSURE: 93 MMHG | HEIGHT: 72 IN | BODY MASS INDEX: 34.25 KG/M2 | TEMPERATURE: 97.6 F | WEIGHT: 252.9 LBS | OXYGEN SATURATION: 90 %

## 2022-01-01 VITALS
SYSTOLIC BLOOD PRESSURE: 136 MMHG | HEART RATE: 74 BPM | OXYGEN SATURATION: 92 % | DIASTOLIC BLOOD PRESSURE: 84 MMHG | TEMPERATURE: 97.3 F | RESPIRATION RATE: 18 BRPM

## 2022-01-01 VITALS
SYSTOLIC BLOOD PRESSURE: 172 MMHG | RESPIRATION RATE: 20 BRPM | OXYGEN SATURATION: 96 % | DIASTOLIC BLOOD PRESSURE: 96 MMHG | TEMPERATURE: 97.9 F | HEART RATE: 61 BPM

## 2022-01-01 DIAGNOSIS — E11.42 TYPE 2 DIABETES MELLITUS WITH DIABETIC POLYNEUROPATHY, WITHOUT LONG-TERM CURRENT USE OF INSULIN: Primary | ICD-10-CM

## 2022-01-01 DIAGNOSIS — I87.8 CHRONIC VENOUS STASIS: ICD-10-CM

## 2022-01-01 DIAGNOSIS — I50.21 ACUTE SYSTOLIC CONGESTIVE HEART FAILURE: Primary | ICD-10-CM

## 2022-01-01 DIAGNOSIS — L97.919 ULCER OF RIGHT LOWER EXTREMITY, UNSPECIFIED ULCER STAGE: ICD-10-CM

## 2022-01-01 DIAGNOSIS — R41.82 ALTERED MENTAL STATUS, UNSPECIFIED ALTERED MENTAL STATUS TYPE: ICD-10-CM

## 2022-01-01 DIAGNOSIS — J96.02 ACUTE RESPIRATORY FAILURE WITH HYPOXIA AND HYPERCAPNIA: Primary | ICD-10-CM

## 2022-01-01 DIAGNOSIS — A41.9 SEPSIS, DUE TO UNSPECIFIED ORGANISM, UNSPECIFIED WHETHER ACUTE ORGAN DYSFUNCTION PRESENT: ICD-10-CM

## 2022-01-01 DIAGNOSIS — U07.1 COVID-19: ICD-10-CM

## 2022-01-01 DIAGNOSIS — I16.1 HYPERTENSIVE EMERGENCY: ICD-10-CM

## 2022-01-01 DIAGNOSIS — I50.9 ACUTE CONGESTIVE HEART FAILURE, UNSPECIFIED HEART FAILURE TYPE: ICD-10-CM

## 2022-01-01 DIAGNOSIS — I73.9 PERIPHERAL VASCULAR DISEASE: ICD-10-CM

## 2022-01-01 DIAGNOSIS — J96.01 ACUTE RESPIRATORY FAILURE WITH HYPOXIA AND HYPERCAPNIA: Primary | ICD-10-CM

## 2022-01-01 DIAGNOSIS — I50.9 CONGESTIVE HEART FAILURE, UNSPECIFIED HF CHRONICITY, UNSPECIFIED HEART FAILURE TYPE: ICD-10-CM

## 2022-01-01 LAB
ABO GROUP BLD: NORMAL
ALBUMIN SERPL-MCNC: 2.8 G/DL (ref 3.5–5.2)
ALBUMIN SERPL-MCNC: 2.8 G/DL (ref 3.5–5.2)
ALBUMIN SERPL-MCNC: 2.9 G/DL (ref 3.5–5.2)
ALBUMIN SERPL-MCNC: 3.1 G/DL (ref 3.5–5.2)
ALBUMIN SERPL-MCNC: 3.2 G/DL (ref 3.5–5.2)
ALBUMIN SERPL-MCNC: 3.3 G/DL (ref 3.5–5.2)
ALBUMIN SERPL-MCNC: 3.4 G/DL (ref 3.5–5.2)
ALBUMIN SERPL-MCNC: 3.8 G/DL (ref 3.5–5.2)
ALBUMIN SERPL-MCNC: 3.9 G/DL (ref 3.5–5.2)
ALBUMIN SERPL-MCNC: 4.1 G/DL (ref 3.5–5.2)
ALBUMIN SERPL-MCNC: 4.3 G/DL (ref 3.5–5.2)
ALBUMIN/GLOB SERPL: 1.2 G/DL
ALBUMIN/GLOB SERPL: 1.2 G/DL
ALBUMIN/GLOB SERPL: 1.3 G/DL
ALP SERPL-CCNC: 106 U/L (ref 39–117)
ALP SERPL-CCNC: 122 U/L (ref 39–117)
ALP SERPL-CCNC: 135 U/L (ref 39–117)
ALP SERPL-CCNC: 149 U/L (ref 39–117)
ALP SERPL-CCNC: 152 U/L (ref 39–117)
ALP SERPL-CCNC: 85 U/L (ref 39–117)
ALP SERPL-CCNC: 95 U/L (ref 39–117)
ALT SERPL W P-5'-P-CCNC: 133 U/L (ref 1–41)
ALT SERPL W P-5'-P-CCNC: 32 U/L (ref 1–41)
ALT SERPL W P-5'-P-CCNC: 35 U/L (ref 1–41)
ALT SERPL W P-5'-P-CCNC: 36 U/L (ref 1–41)
ALT SERPL W P-5'-P-CCNC: 45 U/L (ref 1–41)
ALT SERPL W P-5'-P-CCNC: 53 U/L (ref 1–41)
ALT SERPL W P-5'-P-CCNC: 55 U/L (ref 1–41)
AMPHET+METHAMPHET UR QL: NEGATIVE
AMPHET+METHAMPHET UR QL: NEGATIVE
ANION GAP SERPL CALCULATED.3IONS-SCNC: 10.4 MMOL/L (ref 5–15)
ANION GAP SERPL CALCULATED.3IONS-SCNC: 11 MMOL/L (ref 5–15)
ANION GAP SERPL CALCULATED.3IONS-SCNC: 11.2 MMOL/L (ref 5–15)
ANION GAP SERPL CALCULATED.3IONS-SCNC: 11.4 MMOL/L (ref 5–15)
ANION GAP SERPL CALCULATED.3IONS-SCNC: 11.7 MMOL/L (ref 5–15)
ANION GAP SERPL CALCULATED.3IONS-SCNC: 12.1 MMOL/L (ref 5–15)
ANION GAP SERPL CALCULATED.3IONS-SCNC: 18 MMOL/L (ref 5–15)
ANION GAP SERPL CALCULATED.3IONS-SCNC: 22 MMOL/L (ref 5–15)
ANION GAP SERPL CALCULATED.3IONS-SCNC: 7.8 MMOL/L (ref 5–15)
ANION GAP SERPL CALCULATED.3IONS-SCNC: 8.7 MMOL/L (ref 5–15)
ANION GAP SERPL CALCULATED.3IONS-SCNC: 9 MMOL/L (ref 5–15)
ANION GAP SERPL CALCULATED.3IONS-SCNC: 9.5 MMOL/L (ref 5–15)
ANION GAP SERPL CALCULATED.3IONS-SCNC: 9.6 MMOL/L (ref 5–15)
ANION GAP SERPL CALCULATED.3IONS-SCNC: 9.8 MMOL/L (ref 5–15)
AORTIC DIMENSIONLESS INDEX: 0.5 (DI)
ARTERIAL PATENCY WRIST A: POSITIVE
AST SERPL-CCNC: 12 U/L (ref 1–40)
AST SERPL-CCNC: 170 U/L (ref 1–40)
AST SERPL-CCNC: 18 U/L (ref 1–40)
AST SERPL-CCNC: 26 U/L (ref 1–40)
AST SERPL-CCNC: 33 U/L (ref 1–40)
AST SERPL-CCNC: 44 U/L (ref 1–40)
AST SERPL-CCNC: 46 U/L (ref 1–40)
ATMOSPHERIC PRESS: 737.8 MMHG
ATMOSPHERIC PRESS: 739.7 MMHG
ATMOSPHERIC PRESS: 741.2 MMHG
ATMOSPHERIC PRESS: 741.7 MMHG
ATMOSPHERIC PRESS: 742.8 MMHG
ATMOSPHERIC PRESS: 748.6 MMHG
ATMOSPHERIC PRESS: 761.6 MMHG
B PARAPERT DNA SPEC QL NAA+PROBE: NOT DETECTED
B PARAPERT DNA SPEC QL NAA+PROBE: NOT DETECTED
B PERT DNA SPEC QL NAA+PROBE: NOT DETECTED
B PERT DNA SPEC QL NAA+PROBE: NOT DETECTED
BACTERIA BLD CULT: ABNORMAL
BACTERIA SPEC AEROBE CULT: ABNORMAL
BACTERIA SPEC AEROBE CULT: NORMAL
BACTERIA SPEC AEROBE CULT: NORMAL
BACTERIA SPEC RESP CULT: NORMAL
BACTERIA UR QL AUTO: ABNORMAL /HPF
BACTERIA UR QL AUTO: ABNORMAL /HPF
BARBITURATES UR QL SCN: NEGATIVE
BARBITURATES UR QL SCN: NEGATIVE
BASE EXCESS BLDA CALC-SCNC: -1 MMOL/L (ref 0–2)
BASE EXCESS BLDA CALC-SCNC: -1.2 MMOL/L (ref 0–2)
BASE EXCESS BLDA CALC-SCNC: -6.2 MMOL/L (ref 0–2)
BASE EXCESS BLDA CALC-SCNC: -6.4 MMOL/L (ref 0–2)
BASE EXCESS BLDA CALC-SCNC: 1.2 MMOL/L (ref 0–2)
BASE EXCESS BLDA CALC-SCNC: 1.4 MMOL/L (ref 0–2)
BASE EXCESS BLDA CALC-SCNC: 3.1 MMOL/L (ref 0–2)
BASOPHILS # BLD AUTO: 0.02 10*3/MM3 (ref 0–0.2)
BASOPHILS # BLD AUTO: 0.05 10*3/MM3 (ref 0–0.2)
BASOPHILS # BLD AUTO: 0.07 10*3/MM3 (ref 0–0.2)
BASOPHILS NFR BLD AUTO: 0.2 % (ref 0–1.5)
BASOPHILS NFR BLD AUTO: 0.4 % (ref 0–1.5)
BASOPHILS NFR BLD AUTO: 0.4 % (ref 0–1.5)
BDY SITE: ABNORMAL
BENZODIAZ UR QL SCN: NEGATIVE
BENZODIAZ UR QL SCN: NEGATIVE
BH CV ECHO MEAS - AO MAX PG (FULL): 8.5 MMHG
BH CV ECHO MEAS - AO MAX PG: 11.6 MMHG
BH CV ECHO MEAS - AO MEAN PG (FULL): 3.9 MMHG
BH CV ECHO MEAS - AO MEAN PG: 5.4 MMHG
BH CV ECHO MEAS - AO V2 MAX: 170.5 CM/SEC
BH CV ECHO MEAS - AO V2 MEAN: 104.9 CM/SEC
BH CV ECHO MEAS - AO V2 VTI: 36.9 CM
BH CV ECHO MEAS - BSA(HAYCOCK): 2.7 M^2
BH CV ECHO MEAS - BSA: 2.5 M^2
BH CV ECHO MEAS - BZI_BMI: 40.3 KILOGRAMS/M^2
BH CV ECHO MEAS - BZI_METRIC_HEIGHT: 182.9 CM
BH CV ECHO MEAS - BZI_METRIC_WEIGHT: 134.7 KG
BH CV ECHO MEAS - CONTRAST EF 4CH: 28 CM2
BH CV ECHO MEAS - EDV(MOD-SP2): 204 ML
BH CV ECHO MEAS - EDV(MOD-SP4): 200 ML
BH CV ECHO MEAS - EF(MOD-BP): 28.4 %
BH CV ECHO MEAS - EF(MOD-SP2): 31.9 %
BH CV ECHO MEAS - EF(MOD-SP4): 28.5 %
BH CV ECHO MEAS - ESV(MOD-SP2): 139 ML
BH CV ECHO MEAS - ESV(MOD-SP4): 143 ML
BH CV ECHO MEAS - LV DIASTOLIC VOL/BSA (35-75): 79.4 ML/M^2
BH CV ECHO MEAS - LV MAX PG: 3.1 MMHG
BH CV ECHO MEAS - LV MEAN PG: 1.5 MMHG
BH CV ECHO MEAS - LV SYSTOLIC VOL/BSA (12-30): 56.7 ML/M^2
BH CV ECHO MEAS - LV V1 MAX: 88.3 CM/SEC
BH CV ECHO MEAS - LV V1 MEAN: 54.8 CM/SEC
BH CV ECHO MEAS - LV V1 VTI: 18.9 CM
BH CV ECHO MEAS - LVLD AP2: 9.6 CM
BH CV ECHO MEAS - LVLD AP4: 8.7 CM
BH CV ECHO MEAS - LVLS AP2: 8.9 CM
BH CV ECHO MEAS - LVLS AP4: 7.7 CM
BH CV ECHO MEAS - MV A DUR: 0.14 SEC
BH CV ECHO MEAS - MV A MAX VEL: 82.3 CM/SEC
BH CV ECHO MEAS - MV DEC SLOPE: 150.9 CM/SEC^2
BH CV ECHO MEAS - MV DEC TIME: 225 SEC
BH CV ECHO MEAS - MV E MAX VEL: 63 CM/SEC
BH CV ECHO MEAS - MV E/A: 0.77
BH CV ECHO MEAS - MV MAX PG: 3.7 MMHG
BH CV ECHO MEAS - MV MEAN PG: 0.93 MMHG
BH CV ECHO MEAS - MV P1/2T MAX VEL: 75.6 CM/SEC
BH CV ECHO MEAS - MV P1/2T: 146.8 MSEC
BH CV ECHO MEAS - MV V2 MAX: 96 CM/SEC
BH CV ECHO MEAS - MV V2 MEAN: 43.4 CM/SEC
BH CV ECHO MEAS - MV V2 VTI: 41.7 CM
BH CV ECHO MEAS - MVA P1/2T LCG: 2.9 CM^2
BH CV ECHO MEAS - MVA(P1/2T): 1.5 CM^2
BH CV ECHO MEAS - RAP SYSTOLE: 15 MMHG
BH CV ECHO MEAS - SI(MOD-SP2): 25.8 ML/M^2
BH CV ECHO MEAS - SI(MOD-SP4): 22.6 ML/M^2
BH CV ECHO MEAS - SV(MOD-SP2): 65 ML
BH CV ECHO MEAS - SV(MOD-SP4): 57 ML
BH CV ECHO MEAS - TAPSE (>1.6): 2 CM
BH CV VAS BP RIGHT ARM: NORMAL MMHG
BH CV XLRA - RV BASE: 3.5 CM
BH CV XLRA - RV LENGTH: 8 CM
BH CV XLRA - RV MID: 4.4 CM
BILIRUB CONJ SERPL-MCNC: 0.2 MG/DL (ref 0–0.3)
BILIRUB CONJ SERPL-MCNC: <0.2 MG/DL (ref 0–0.3)
BILIRUB INDIRECT SERPL-MCNC: 0.2 MG/DL
BILIRUB INDIRECT SERPL-MCNC: ABNORMAL MG/DL
BILIRUB SERPL-MCNC: 0.2 MG/DL (ref 0–1.2)
BILIRUB SERPL-MCNC: 0.3 MG/DL (ref 0–1.2)
BILIRUB SERPL-MCNC: 0.4 MG/DL (ref 0–1.2)
BILIRUB UR QL STRIP: NEGATIVE
BILIRUB UR QL STRIP: NEGATIVE
BLD GP AB SCN SERPL QL: NEGATIVE
BOTTLE TYPE: ABNORMAL
BUN SERPL-MCNC: 21 MG/DL (ref 8–23)
BUN SERPL-MCNC: 23 MG/DL (ref 8–23)
BUN SERPL-MCNC: 24 MG/DL (ref 8–23)
BUN SERPL-MCNC: 27 MG/DL (ref 8–23)
BUN SERPL-MCNC: 27 MG/DL (ref 8–23)
BUN SERPL-MCNC: 33 MG/DL (ref 8–23)
BUN SERPL-MCNC: 34 MG/DL (ref 8–23)
BUN SERPL-MCNC: 35 MG/DL (ref 8–23)
BUN SERPL-MCNC: 38 MG/DL (ref 8–23)
BUN SERPL-MCNC: 44 MG/DL (ref 8–23)
BUN SERPL-MCNC: 46 MG/DL (ref 8–23)
BUN SERPL-MCNC: 48 MG/DL (ref 8–23)
BUN SERPL-MCNC: 52 MG/DL (ref 8–23)
BUN SERPL-MCNC: 78 MG/DL (ref 8–23)
BUN/CREAT SERPL: 13.7 (ref 7–25)
BUN/CREAT SERPL: 16.1 (ref 7–25)
BUN/CREAT SERPL: 16.4 (ref 7–25)
BUN/CREAT SERPL: 20.3 (ref 7–25)
BUN/CREAT SERPL: 21.3 (ref 7–25)
BUN/CREAT SERPL: 23.8 (ref 7–25)
BUN/CREAT SERPL: 24.2 (ref 7–25)
BUN/CREAT SERPL: 25.3 (ref 7–25)
BUN/CREAT SERPL: 27.7 (ref 7–25)
BUN/CREAT SERPL: 27.8 (ref 7–25)
BUN/CREAT SERPL: 28.7 (ref 7–25)
BUN/CREAT SERPL: 28.8 (ref 7–25)
BUN/CREAT SERPL: 32.9 (ref 7–25)
BUN/CREAT SERPL: 45.2 (ref 7–25)
C PNEUM DNA NPH QL NAA+NON-PROBE: NOT DETECTED
C PNEUM DNA NPH QL NAA+NON-PROBE: NOT DETECTED
CALCIUM SPEC-SCNC: 8 MG/DL (ref 8.6–10.5)
CALCIUM SPEC-SCNC: 8 MG/DL (ref 8.6–10.5)
CALCIUM SPEC-SCNC: 8.1 MG/DL (ref 8.6–10.5)
CALCIUM SPEC-SCNC: 8.2 MG/DL (ref 8.6–10.5)
CALCIUM SPEC-SCNC: 8.3 MG/DL (ref 8.6–10.5)
CALCIUM SPEC-SCNC: 8.4 MG/DL (ref 8.6–10.5)
CALCIUM SPEC-SCNC: 8.5 MG/DL (ref 8.6–10.5)
CALCIUM SPEC-SCNC: 8.6 MG/DL (ref 8.6–10.5)
CALCIUM SPEC-SCNC: 8.7 MG/DL (ref 8.6–10.5)
CALCIUM SPEC-SCNC: 8.7 MG/DL (ref 8.6–10.5)
CALCIUM SPEC-SCNC: 8.9 MG/DL (ref 8.6–10.5)
CALCIUM SPEC-SCNC: 8.9 MG/DL (ref 8.6–10.5)
CALCIUM SPEC-SCNC: 9.1 MG/DL (ref 8.6–10.5)
CALCIUM SPEC-SCNC: 9.5 MG/DL (ref 8.6–10.5)
CANNABINOIDS SERPL QL: NEGATIVE
CANNABINOIDS SERPL QL: NEGATIVE
CHLORIDE SERPL-SCNC: 100 MMOL/L (ref 98–107)
CHLORIDE SERPL-SCNC: 101 MMOL/L (ref 98–107)
CHLORIDE SERPL-SCNC: 102 MMOL/L (ref 98–107)
CHLORIDE SERPL-SCNC: 103 MMOL/L (ref 98–107)
CHLORIDE SERPL-SCNC: 104 MMOL/L (ref 98–107)
CHLORIDE SERPL-SCNC: 104 MMOL/L (ref 98–107)
CHLORIDE SERPL-SCNC: 107 MMOL/L (ref 98–107)
CHLORIDE SERPL-SCNC: 107 MMOL/L (ref 98–107)
CHLORIDE SERPL-SCNC: 99 MMOL/L (ref 98–107)
CLARITY UR: CLEAR
CLARITY UR: CLEAR
CO2 SERPL-SCNC: 21 MMOL/L (ref 22–29)
CO2 SERPL-SCNC: 24.6 MMOL/L (ref 22–29)
CO2 SERPL-SCNC: 25.9 MMOL/L (ref 22–29)
CO2 SERPL-SCNC: 26.3 MMOL/L (ref 22–29)
CO2 SERPL-SCNC: 26.5 MMOL/L (ref 22–29)
CO2 SERPL-SCNC: 26.8 MMOL/L (ref 22–29)
CO2 SERPL-SCNC: 27 MMOL/L (ref 22–29)
CO2 SERPL-SCNC: 27.2 MMOL/L (ref 22–29)
CO2 SERPL-SCNC: 27.3 MMOL/L (ref 22–29)
CO2 SERPL-SCNC: 27.6 MMOL/L (ref 22–29)
CO2 SERPL-SCNC: 29 MMOL/L (ref 22–29)
CO2 SERPL-SCNC: 30.2 MMOL/L (ref 22–29)
CO2 SERPL-SCNC: 31.4 MMOL/L (ref 22–29)
CO2 SERPL-SCNC: 34 MMOL/L (ref 22–29)
COCAINE UR QL: NEGATIVE
COCAINE UR QL: NEGATIVE
COLOR UR: YELLOW
COLOR UR: YELLOW
CREAT SERPL-MCNC: 0.73 MG/DL (ref 0.76–1.27)
CREAT SERPL-MCNC: 1.18 MG/DL (ref 0.76–1.27)
CREAT SERPL-MCNC: 1.26 MG/DL (ref 0.76–1.27)
CREAT SERPL-MCNC: 1.27 MG/DL (ref 0.76–1.27)
CREAT SERPL-MCNC: 1.33 MG/DL (ref 0.76–1.27)
CREAT SERPL-MCNC: 1.35 MG/DL (ref 0.76–1.27)
CREAT SERPL-MCNC: 1.4 MG/DL (ref 0.76–1.27)
CREAT SERPL-MCNC: 1.4 MG/DL (ref 0.76–1.27)
CREAT SERPL-MCNC: 1.49 MG/DL (ref 0.76–1.27)
CREAT SERPL-MCNC: 1.53 MG/DL (ref 0.76–1.27)
CREAT SERPL-MCNC: 1.57 MG/DL (ref 0.76–1.27)
CREAT SERPL-MCNC: 1.74 MG/DL (ref 0.76–1.27)
CREAT SERPL-MCNC: 1.81 MG/DL (ref 0.76–1.27)
CREAT SERPL-MCNC: 2.02 MG/DL (ref 0.76–1.27)
CREAT SERPL-MCNC: 2.82 MG/DL (ref 0.76–1.27)
CRP SERPL-MCNC: 1.38 MG/DL (ref 0–0.5)
CRP SERPL-MCNC: 2.04 MG/DL (ref 0–0.5)
D DIMER PPP FEU-MCNC: 0.96 MCGFEU/ML (ref 0–0.49)
D-LACTATE SERPL-SCNC: 1.2 MMOL/L (ref 0.5–2)
D-LACTATE SERPL-SCNC: 1.9 MMOL/L (ref 0.5–2)
D-LACTATE SERPL-SCNC: 11.4 MMOL/L (ref 0.5–2)
D-LACTATE SERPL-SCNC: 2 MMOL/L (ref 0.5–2)
D-LACTATE SERPL-SCNC: 2.7 MMOL/L (ref 0.5–2)
DEPRECATED RDW RBC AUTO: 40.5 FL (ref 37–54)
DEPRECATED RDW RBC AUTO: 42.2 FL (ref 37–54)
DEPRECATED RDW RBC AUTO: 42.6 FL (ref 37–54)
DEPRECATED RDW RBC AUTO: 42.9 FL (ref 37–54)
DEPRECATED RDW RBC AUTO: 43 FL (ref 37–54)
DEPRECATED RDW RBC AUTO: 43.3 FL (ref 37–54)
DEPRECATED RDW RBC AUTO: 43.4 FL (ref 37–54)
DEPRECATED RDW RBC AUTO: 43.4 FL (ref 37–54)
DEPRECATED RDW RBC AUTO: 43.6 FL (ref 37–54)
DEPRECATED RDW RBC AUTO: 44.8 FL (ref 37–54)
DEPRECATED RDW RBC AUTO: 45.2 FL (ref 37–54)
DEPRECATED RDW RBC AUTO: 45.8 FL (ref 37–54)
DEPRECATED RDW RBC AUTO: 45.8 FL (ref 37–54)
DEPRECATED RDW RBC AUTO: 46.8 FL (ref 37–54)
EOSINOPHIL # BLD AUTO: 0 10*3/MM3 (ref 0–0.4)
EOSINOPHIL # BLD AUTO: 0.06 10*3/MM3 (ref 0–0.4)
EOSINOPHIL # BLD AUTO: 0.09 10*3/MM3 (ref 0–0.4)
EOSINOPHIL NFR BLD AUTO: 0 % (ref 0.3–6.2)
EOSINOPHIL NFR BLD AUTO: 0.4 % (ref 0.3–6.2)
EOSINOPHIL NFR BLD AUTO: 0.6 % (ref 0.3–6.2)
ERYTHROCYTE [DISTWIDTH] IN BLOOD BY AUTOMATED COUNT: 13.1 % (ref 12.3–15.4)
ERYTHROCYTE [DISTWIDTH] IN BLOOD BY AUTOMATED COUNT: 13.2 % (ref 12.3–15.4)
ERYTHROCYTE [DISTWIDTH] IN BLOOD BY AUTOMATED COUNT: 13.3 % (ref 12.3–15.4)
ERYTHROCYTE [DISTWIDTH] IN BLOOD BY AUTOMATED COUNT: 13.3 % (ref 12.3–15.4)
ERYTHROCYTE [DISTWIDTH] IN BLOOD BY AUTOMATED COUNT: 13.4 % (ref 12.3–15.4)
ERYTHROCYTE [DISTWIDTH] IN BLOOD BY AUTOMATED COUNT: 13.5 % (ref 12.3–15.4)
ERYTHROCYTE [DISTWIDTH] IN BLOOD BY AUTOMATED COUNT: 13.5 % (ref 12.3–15.4)
ERYTHROCYTE [DISTWIDTH] IN BLOOD BY AUTOMATED COUNT: 13.6 % (ref 12.3–15.4)
ERYTHROCYTE [DISTWIDTH] IN BLOOD BY AUTOMATED COUNT: 13.7 % (ref 12.3–15.4)
ERYTHROCYTE [DISTWIDTH] IN BLOOD BY AUTOMATED COUNT: 13.8 % (ref 12.3–15.4)
ETHANOL BLD-MCNC: <10 MG/DL (ref 0–10)
ETHANOL UR QL: <0.01 %
FLUAV SUBTYP SPEC NAA+PROBE: NOT DETECTED
FLUAV SUBTYP SPEC NAA+PROBE: NOT DETECTED
FLUBV RNA ISLT QL NAA+PROBE: NOT DETECTED
FLUBV RNA ISLT QL NAA+PROBE: NOT DETECTED
GFR SERPL CREATININE-BSD FRML MDRD: 104 ML/MIN/1.73
GFR SERPL CREATININE-BSD FRML MDRD: 22 ML/MIN/1.73
GFR SERPL CREATININE-BSD FRML MDRD: 32 ML/MIN/1.73
GFR SERPL CREATININE-BSD FRML MDRD: 37 ML/MIN/1.73
GFR SERPL CREATININE-BSD FRML MDRD: 38 ML/MIN/1.73
GFR SERPL CREATININE-BSD FRML MDRD: 43 ML/MIN/1.73
GFR SERPL CREATININE-BSD FRML MDRD: 44 ML/MIN/1.73
GFR SERPL CREATININE-BSD FRML MDRD: 46 ML/MIN/1.73
GFR SERPL CREATININE-BSD FRML MDRD: 49 ML/MIN/1.73
GFR SERPL CREATININE-BSD FRML MDRD: 49 ML/MIN/1.73
GFR SERPL CREATININE-BSD FRML MDRD: 51 ML/MIN/1.73
GFR SERPL CREATININE-BSD FRML MDRD: 52 ML/MIN/1.73
GFR SERPL CREATININE-BSD FRML MDRD: 55 ML/MIN/1.73
GFR SERPL CREATININE-BSD FRML MDRD: 56 ML/MIN/1.73
GFR SERPL CREATININE-BSD FRML MDRD: 60 ML/MIN/1.73
GLOBULIN UR ELPH-MCNC: 2.2 GM/DL
GLOBULIN UR ELPH-MCNC: 3.4 GM/DL
GLOBULIN UR ELPH-MCNC: 3.7 GM/DL
GLUCOSE BLDC GLUCOMTR-MCNC: 103 MG/DL (ref 70–130)
GLUCOSE BLDC GLUCOMTR-MCNC: 104 MG/DL (ref 70–130)
GLUCOSE BLDC GLUCOMTR-MCNC: 106 MG/DL (ref 70–130)
GLUCOSE BLDC GLUCOMTR-MCNC: 111 MG/DL (ref 70–130)
GLUCOSE BLDC GLUCOMTR-MCNC: 113 MG/DL (ref 70–130)
GLUCOSE BLDC GLUCOMTR-MCNC: 114 MG/DL (ref 70–130)
GLUCOSE BLDC GLUCOMTR-MCNC: 116 MG/DL (ref 70–130)
GLUCOSE BLDC GLUCOMTR-MCNC: 120 MG/DL (ref 70–130)
GLUCOSE BLDC GLUCOMTR-MCNC: 121 MG/DL (ref 70–130)
GLUCOSE BLDC GLUCOMTR-MCNC: 122 MG/DL (ref 70–130)
GLUCOSE BLDC GLUCOMTR-MCNC: 123 MG/DL (ref 70–130)
GLUCOSE BLDC GLUCOMTR-MCNC: 124 MG/DL (ref 70–130)
GLUCOSE BLDC GLUCOMTR-MCNC: 125 MG/DL (ref 70–130)
GLUCOSE BLDC GLUCOMTR-MCNC: 129 MG/DL (ref 70–130)
GLUCOSE BLDC GLUCOMTR-MCNC: 136 MG/DL (ref 70–130)
GLUCOSE BLDC GLUCOMTR-MCNC: 137 MG/DL (ref 70–130)
GLUCOSE BLDC GLUCOMTR-MCNC: 141 MG/DL (ref 70–130)
GLUCOSE BLDC GLUCOMTR-MCNC: 142 MG/DL (ref 70–130)
GLUCOSE BLDC GLUCOMTR-MCNC: 145 MG/DL (ref 70–130)
GLUCOSE BLDC GLUCOMTR-MCNC: 150 MG/DL (ref 70–130)
GLUCOSE BLDC GLUCOMTR-MCNC: 154 MG/DL (ref 70–130)
GLUCOSE BLDC GLUCOMTR-MCNC: 156 MG/DL (ref 70–130)
GLUCOSE BLDC GLUCOMTR-MCNC: 164 MG/DL (ref 70–130)
GLUCOSE BLDC GLUCOMTR-MCNC: 164 MG/DL (ref 70–130)
GLUCOSE BLDC GLUCOMTR-MCNC: 166 MG/DL (ref 70–130)
GLUCOSE BLDC GLUCOMTR-MCNC: 167 MG/DL (ref 70–130)
GLUCOSE BLDC GLUCOMTR-MCNC: 169 MG/DL (ref 70–130)
GLUCOSE BLDC GLUCOMTR-MCNC: 174 MG/DL (ref 70–130)
GLUCOSE BLDC GLUCOMTR-MCNC: 177 MG/DL (ref 70–130)
GLUCOSE BLDC GLUCOMTR-MCNC: 178 MG/DL (ref 70–130)
GLUCOSE BLDC GLUCOMTR-MCNC: 179 MG/DL (ref 70–130)
GLUCOSE BLDC GLUCOMTR-MCNC: 180 MG/DL (ref 70–130)
GLUCOSE BLDC GLUCOMTR-MCNC: 181 MG/DL (ref 70–130)
GLUCOSE BLDC GLUCOMTR-MCNC: 195 MG/DL (ref 70–130)
GLUCOSE BLDC GLUCOMTR-MCNC: 208 MG/DL (ref 70–130)
GLUCOSE BLDC GLUCOMTR-MCNC: 212 MG/DL (ref 70–130)
GLUCOSE BLDC GLUCOMTR-MCNC: 215 MG/DL (ref 70–130)
GLUCOSE BLDC GLUCOMTR-MCNC: 228 MG/DL (ref 70–130)
GLUCOSE BLDC GLUCOMTR-MCNC: 234 MG/DL (ref 70–130)
GLUCOSE BLDC GLUCOMTR-MCNC: 238 MG/DL (ref 70–130)
GLUCOSE BLDC GLUCOMTR-MCNC: 258 MG/DL (ref 70–130)
GLUCOSE BLDC GLUCOMTR-MCNC: 82 MG/DL (ref 70–130)
GLUCOSE SERPL-MCNC: 107 MG/DL (ref 65–99)
GLUCOSE SERPL-MCNC: 109 MG/DL (ref 65–99)
GLUCOSE SERPL-MCNC: 110 MG/DL (ref 65–99)
GLUCOSE SERPL-MCNC: 150 MG/DL (ref 65–99)
GLUCOSE SERPL-MCNC: 152 MG/DL (ref 65–99)
GLUCOSE SERPL-MCNC: 153 MG/DL (ref 65–99)
GLUCOSE SERPL-MCNC: 180 MG/DL (ref 65–99)
GLUCOSE SERPL-MCNC: 192 MG/DL (ref 65–99)
GLUCOSE SERPL-MCNC: 196 MG/DL (ref 65–99)
GLUCOSE SERPL-MCNC: 237 MG/DL (ref 65–99)
GLUCOSE SERPL-MCNC: 263 MG/DL (ref 65–99)
GLUCOSE SERPL-MCNC: 270 MG/DL (ref 65–99)
GLUCOSE SERPL-MCNC: 278 MG/DL (ref 65–99)
GLUCOSE SERPL-MCNC: 89 MG/DL (ref 65–99)
GLUCOSE UR STRIP-MCNC: ABNORMAL MG/DL
GLUCOSE UR STRIP-MCNC: ABNORMAL MG/DL
GRAM STN SPEC: ABNORMAL
GRAM STN SPEC: NORMAL
HADV DNA SPEC NAA+PROBE: NOT DETECTED
HADV DNA SPEC NAA+PROBE: NOT DETECTED
HBA1C MFR BLD: 8.95 % (ref 4.8–5.6)
HCO3 BLDA-SCNC: 27.9 MMOL/L (ref 22–28)
HCO3 BLDA-SCNC: 28.6 MMOL/L (ref 22–28)
HCO3 BLDA-SCNC: 30 MMOL/L (ref 22–28)
HCO3 BLDA-SCNC: 30.8 MMOL/L (ref 22–28)
HCO3 BLDA-SCNC: 31.1 MMOL/L (ref 22–28)
HCO3 BLDA-SCNC: 32.6 MMOL/L (ref 22–28)
HCO3 BLDA-SCNC: 32.8 MMOL/L (ref 22–28)
HCOV 229E RNA SPEC QL NAA+PROBE: NOT DETECTED
HCOV 229E RNA SPEC QL NAA+PROBE: NOT DETECTED
HCOV HKU1 RNA SPEC QL NAA+PROBE: NOT DETECTED
HCOV HKU1 RNA SPEC QL NAA+PROBE: NOT DETECTED
HCOV NL63 RNA SPEC QL NAA+PROBE: NOT DETECTED
HCOV NL63 RNA SPEC QL NAA+PROBE: NOT DETECTED
HCOV OC43 RNA SPEC QL NAA+PROBE: NOT DETECTED
HCOV OC43 RNA SPEC QL NAA+PROBE: NOT DETECTED
HCT VFR BLD AUTO: 42.2 % (ref 37.5–51)
HCT VFR BLD AUTO: 44.4 % (ref 37.5–51)
HCT VFR BLD AUTO: 45.1 % (ref 37.5–51)
HCT VFR BLD AUTO: 45.4 % (ref 37.5–51)
HCT VFR BLD AUTO: 45.7 % (ref 37.5–51)
HCT VFR BLD AUTO: 46.1 % (ref 37.5–51)
HCT VFR BLD AUTO: 47.2 % (ref 37.5–51)
HCT VFR BLD AUTO: 47.5 % (ref 37.5–51)
HCT VFR BLD AUTO: 47.5 % (ref 37.5–51)
HCT VFR BLD AUTO: 47.8 % (ref 37.5–51)
HCT VFR BLD AUTO: 50.2 % (ref 37.5–51)
HCT VFR BLD AUTO: 50.4 % (ref 37.5–51)
HCT VFR BLD AUTO: 52.3 % (ref 37.5–51)
HCT VFR BLD AUTO: 55.3 % (ref 37.5–51)
HGB BLD-MCNC: 13.3 G/DL (ref 13–17.7)
HGB BLD-MCNC: 14.2 G/DL (ref 13–17.7)
HGB BLD-MCNC: 14.6 G/DL (ref 13–17.7)
HGB BLD-MCNC: 14.6 G/DL (ref 13–17.7)
HGB BLD-MCNC: 14.9 G/DL (ref 13–17.7)
HGB BLD-MCNC: 15.1 G/DL (ref 13–17.7)
HGB BLD-MCNC: 15.2 G/DL (ref 13–17.7)
HGB BLD-MCNC: 15.6 G/DL (ref 13–17.7)
HGB BLD-MCNC: 15.7 G/DL (ref 13–17.7)
HGB BLD-MCNC: 16.3 G/DL (ref 13–17.7)
HGB BLD-MCNC: 16.4 G/DL (ref 13–17.7)
HGB BLD-MCNC: 16.7 G/DL (ref 13–17.7)
HGB UR QL STRIP.AUTO: ABNORMAL
HGB UR QL STRIP.AUTO: ABNORMAL
HMPV RNA NPH QL NAA+NON-PROBE: NOT DETECTED
HMPV RNA NPH QL NAA+NON-PROBE: NOT DETECTED
HOLD SPECIMEN: NORMAL
HOLD SPECIMEN: NORMAL
HPIV1 RNA ISLT QL NAA+PROBE: NOT DETECTED
HPIV1 RNA ISLT QL NAA+PROBE: NOT DETECTED
HPIV2 RNA SPEC QL NAA+PROBE: NOT DETECTED
HPIV2 RNA SPEC QL NAA+PROBE: NOT DETECTED
HPIV3 RNA NPH QL NAA+PROBE: NOT DETECTED
HPIV3 RNA NPH QL NAA+PROBE: NOT DETECTED
HPIV4 P GENE NPH QL NAA+PROBE: NOT DETECTED
HPIV4 P GENE NPH QL NAA+PROBE: NOT DETECTED
HYALINE CASTS UR QL AUTO: ABNORMAL /LPF
HYALINE CASTS UR QL AUTO: ABNORMAL /LPF
IMM GRANULOCYTES # BLD AUTO: 0.06 10*3/MM3 (ref 0–0.05)
IMM GRANULOCYTES # BLD AUTO: 0.09 10*3/MM3 (ref 0–0.05)
IMM GRANULOCYTES # BLD AUTO: 0.13 10*3/MM3 (ref 0–0.05)
IMM GRANULOCYTES NFR BLD AUTO: 0.6 % (ref 0–0.5)
IMM GRANULOCYTES NFR BLD AUTO: 0.7 % (ref 0–0.5)
IMM GRANULOCYTES NFR BLD AUTO: 0.8 % (ref 0–0.5)
INHALED O2 CONCENTRATION: 100 %
INHALED O2 CONCENTRATION: 50 %
INHALED O2 CONCENTRATION: 80 %
INHALED O2 CONCENTRATION: 80 %
INR PPP: 1.03 (ref 0.9–1.1)
INR PPP: 1.9 (ref 0.9–1.1)
ISOLATED FROM: ABNORMAL
KETONES UR QL STRIP: NEGATIVE
KETONES UR QL STRIP: NEGATIVE
LEFT ATRIUM VOLUME INDEX: 35 ML/M2
LEUKOCYTE ESTERASE UR QL STRIP.AUTO: ABNORMAL
LEUKOCYTE ESTERASE UR QL STRIP.AUTO: NEGATIVE
LV EF 2D ECHO EST: 30 %
LYMPHOCYTES # BLD AUTO: 0.42 10*3/MM3 (ref 0.7–3.1)
LYMPHOCYTES # BLD AUTO: 1.42 10*3/MM3 (ref 0.7–3.1)
LYMPHOCYTES # BLD AUTO: 2.9 10*3/MM3 (ref 0.7–3.1)
LYMPHOCYTES NFR BLD AUTO: 10.1 % (ref 19.6–45.3)
LYMPHOCYTES NFR BLD AUTO: 17.8 % (ref 19.6–45.3)
LYMPHOCYTES NFR BLD AUTO: 4.7 % (ref 19.6–45.3)
M PNEUMO IGG SER IA-ACNC: NOT DETECTED
M PNEUMO IGG SER IA-ACNC: NOT DETECTED
MAGNESIUM SERPL-MCNC: 1.9 MG/DL (ref 1.6–2.4)
MAGNESIUM SERPL-MCNC: 2.1 MG/DL (ref 1.6–2.4)
MAGNESIUM SERPL-MCNC: 2.4 MG/DL (ref 1.6–2.4)
MAXIMAL PREDICTED HEART RATE: 144 BPM
MCH RBC QN AUTO: 27.7 PG (ref 26.6–33)
MCH RBC QN AUTO: 27.9 PG (ref 26.6–33)
MCH RBC QN AUTO: 27.9 PG (ref 26.6–33)
MCH RBC QN AUTO: 28 PG (ref 26.6–33)
MCH RBC QN AUTO: 28 PG (ref 26.6–33)
MCH RBC QN AUTO: 28.2 PG (ref 26.6–33)
MCH RBC QN AUTO: 28.3 PG (ref 26.6–33)
MCH RBC QN AUTO: 28.5 PG (ref 26.6–33)
MCH RBC QN AUTO: 28.5 PG (ref 26.6–33)
MCH RBC QN AUTO: 28.7 PG (ref 26.6–33)
MCHC RBC AUTO-ENTMCNC: 30.2 G/DL (ref 31.5–35.7)
MCHC RBC AUTO-ENTMCNC: 30.8 G/DL (ref 31.5–35.7)
MCHC RBC AUTO-ENTMCNC: 31.1 G/DL (ref 31.5–35.7)
MCHC RBC AUTO-ENTMCNC: 31.2 G/DL (ref 31.5–35.7)
MCHC RBC AUTO-ENTMCNC: 31.4 G/DL (ref 31.5–35.7)
MCHC RBC AUTO-ENTMCNC: 31.5 G/DL (ref 31.5–35.7)
MCHC RBC AUTO-ENTMCNC: 31.5 G/DL (ref 31.5–35.7)
MCHC RBC AUTO-ENTMCNC: 31.8 G/DL (ref 31.5–35.7)
MCHC RBC AUTO-ENTMCNC: 31.9 G/DL (ref 31.5–35.7)
MCHC RBC AUTO-ENTMCNC: 32 G/DL (ref 31.5–35.7)
MCHC RBC AUTO-ENTMCNC: 32.2 G/DL (ref 31.5–35.7)
MCHC RBC AUTO-ENTMCNC: 32.2 G/DL (ref 31.5–35.7)
MCHC RBC AUTO-ENTMCNC: 32.5 G/DL (ref 31.5–35.7)
MCHC RBC AUTO-ENTMCNC: 32.8 G/DL (ref 31.5–35.7)
MCV RBC AUTO: 85.8 FL (ref 79–97)
MCV RBC AUTO: 87 FL (ref 79–97)
MCV RBC AUTO: 87.4 FL (ref 79–97)
MCV RBC AUTO: 87.8 FL (ref 79–97)
MCV RBC AUTO: 88.1 FL (ref 79–97)
MCV RBC AUTO: 88.4 FL (ref 79–97)
MCV RBC AUTO: 88.4 FL (ref 79–97)
MCV RBC AUTO: 88.7 FL (ref 79–97)
MCV RBC AUTO: 89.1 FL (ref 79–97)
MCV RBC AUTO: 90.4 FL (ref 79–97)
MCV RBC AUTO: 90.6 FL (ref 79–97)
MCV RBC AUTO: 91 FL (ref 79–97)
MCV RBC AUTO: 91.9 FL (ref 79–97)
MCV RBC AUTO: 93.1 FL (ref 79–97)
METHADONE UR QL SCN: NEGATIVE
METHADONE UR QL SCN: NEGATIVE
MODALITY: ABNORMAL
MONOCYTES # BLD AUTO: 0.1 10*3/MM3 (ref 0.1–0.9)
MONOCYTES # BLD AUTO: 1.02 10*3/MM3 (ref 0.1–0.9)
MONOCYTES # BLD AUTO: 1.37 10*3/MM3 (ref 0.1–0.9)
MONOCYTES NFR BLD AUTO: 1.1 % (ref 5–12)
MONOCYTES NFR BLD AUTO: 7.2 % (ref 5–12)
MONOCYTES NFR BLD AUTO: 8.4 % (ref 5–12)
MRSA DNA SPEC QL NAA+PROBE: NORMAL
NEUTROPHILS NFR BLD AUTO: 11.44 10*3/MM3 (ref 1.7–7)
NEUTROPHILS NFR BLD AUTO: 11.76 10*3/MM3 (ref 1.7–7)
NEUTROPHILS NFR BLD AUTO: 72 % (ref 42.7–76)
NEUTROPHILS NFR BLD AUTO: 8.42 10*3/MM3 (ref 1.7–7)
NEUTROPHILS NFR BLD AUTO: 81.3 % (ref 42.7–76)
NEUTROPHILS NFR BLD AUTO: 93.3 % (ref 42.7–76)
NITRITE UR QL STRIP: NEGATIVE
NITRITE UR QL STRIP: NEGATIVE
NRBC BLD AUTO-RTO: 0 /100 WBC (ref 0–0.2)
NRBC BLD AUTO-RTO: 0 /100 WBC (ref 0–0.2)
NRBC BLD AUTO-RTO: 0.1 /100 WBC (ref 0–0.2)
NT-PROBNP SERPL-MCNC: 6190 PG/ML (ref 0–1800)
NT-PROBNP SERPL-MCNC: 7387 PG/ML (ref 0–1800)
NT-PROBNP SERPL-MCNC: ABNORMAL PG/ML (ref 0–1800)
O2 A-A PPRESDIFF RESPIRATORY: 0.1 MMHG
O2 A-A PPRESDIFF RESPIRATORY: 0.2 MMHG
O2 A-A PPRESDIFF RESPIRATORY: 0.3 MMHG
OPIATES UR QL: NEGATIVE
OPIATES UR QL: NEGATIVE
OXYCODONE UR QL SCN: NEGATIVE
OXYCODONE UR QL SCN: NEGATIVE
PCO2 BLDA: 110.4 MM HG (ref 35–45)
PCO2 BLDA: 124.2 MM HG (ref 35–45)
PCO2 BLDA: 52.1 MM HG (ref 35–45)
PCO2 BLDA: 65.2 MM HG (ref 35–45)
PCO2 BLDA: 67.2 MM HG (ref 35–45)
PCO2 BLDA: 84.7 MM HG (ref 35–45)
PCO2 BLDA: 94.8 MM HG (ref 35–45)
PEEP RESPIRATORY: 6 CM[H2O]
PEEP RESPIRATORY: 7.5 CM[H2O]
PEEP RESPIRATORY: 8 CM[H2O]
PH BLDA: 7 PH UNITS (ref 7.35–7.45)
PH BLDA: 7.01 PH UNITS (ref 7.35–7.45)
PH BLDA: 7.14 PH UNITS (ref 7.35–7.45)
PH BLDA: 7.2 PH UNITS (ref 7.35–7.45)
PH BLDA: 7.25 PH UNITS (ref 7.35–7.45)
PH BLDA: 7.27 PH UNITS (ref 7.35–7.45)
PH BLDA: 7.37 PH UNITS (ref 7.35–7.45)
PH UR STRIP.AUTO: <=5 [PH] (ref 5–8)
PH UR STRIP.AUTO: <=5 [PH] (ref 5–8)
PHOSPHATE SERPL-MCNC: 2.4 MG/DL (ref 2.5–4.5)
PHOSPHATE SERPL-MCNC: 2.6 MG/DL (ref 2.5–4.5)
PHOSPHATE SERPL-MCNC: 2.6 MG/DL (ref 2.5–4.5)
PHOSPHATE SERPL-MCNC: 3 MG/DL (ref 2.5–4.5)
PHOSPHATE SERPL-MCNC: 3.1 MG/DL (ref 2.5–4.5)
PHOSPHATE SERPL-MCNC: 3.5 MG/DL (ref 2.5–4.5)
PHOSPHATE SERPL-MCNC: 3.6 MG/DL (ref 2.5–4.5)
PHOSPHATE SERPL-MCNC: 4.1 MG/DL (ref 2.5–4.5)
PHOSPHATE SERPL-MCNC: 4.7 MG/DL (ref 2.5–4.5)
PHOSPHATE SERPL-MCNC: 4.8 MG/DL (ref 2.5–4.5)
PLATELET # BLD AUTO: 183 10*3/MM3 (ref 140–450)
PLATELET # BLD AUTO: 191 10*3/MM3 (ref 140–450)
PLATELET # BLD AUTO: 199 10*3/MM3 (ref 140–450)
PLATELET # BLD AUTO: 203 10*3/MM3 (ref 140–450)
PLATELET # BLD AUTO: 206 10*3/MM3 (ref 140–450)
PLATELET # BLD AUTO: 208 10*3/MM3 (ref 140–450)
PLATELET # BLD AUTO: 210 10*3/MM3 (ref 140–450)
PLATELET # BLD AUTO: 212 10*3/MM3 (ref 140–450)
PLATELET # BLD AUTO: 220 10*3/MM3 (ref 140–450)
PLATELET # BLD AUTO: 230 10*3/MM3 (ref 140–450)
PLATELET # BLD AUTO: 234 10*3/MM3 (ref 140–450)
PLATELET # BLD AUTO: 282 10*3/MM3 (ref 140–450)
PLATELET # BLD AUTO: 285 10*3/MM3 (ref 140–450)
PLATELET # BLD AUTO: 313 10*3/MM3 (ref 140–450)
PMV BLD AUTO: 10.9 FL (ref 6–12)
PMV BLD AUTO: 11.3 FL (ref 6–12)
PMV BLD AUTO: 11.3 FL (ref 6–12)
PMV BLD AUTO: 11.4 FL (ref 6–12)
PMV BLD AUTO: 11.6 FL (ref 6–12)
PMV BLD AUTO: 11.7 FL (ref 6–12)
PMV BLD AUTO: 11.8 FL (ref 6–12)
PMV BLD AUTO: 12.1 FL (ref 6–12)
PMV BLD AUTO: 12.2 FL (ref 6–12)
PMV BLD AUTO: 12.6 FL (ref 6–12)
PO2 BLDA: 126.7 MM HG (ref 80–100)
PO2 BLDA: 145.4 MM HG (ref 80–100)
PO2 BLDA: 77 MM HG (ref 80–100)
PO2 BLDA: 78.7 MM HG (ref 80–100)
PO2 BLDA: 83.1 MM HG (ref 80–100)
PO2 BLDA: 85.2 MM HG (ref 80–100)
PO2 BLDA: 99.5 MM HG (ref 80–100)
POTASSIUM SERPL-SCNC: 3.4 MMOL/L (ref 3.5–5.2)
POTASSIUM SERPL-SCNC: 3.8 MMOL/L (ref 3.5–5.2)
POTASSIUM SERPL-SCNC: 3.8 MMOL/L (ref 3.5–5.2)
POTASSIUM SERPL-SCNC: 4 MMOL/L (ref 3.5–5.2)
POTASSIUM SERPL-SCNC: 4 MMOL/L (ref 3.5–5.2)
POTASSIUM SERPL-SCNC: 4.1 MMOL/L (ref 3.5–5.2)
POTASSIUM SERPL-SCNC: 4.4 MMOL/L (ref 3.5–5.2)
POTASSIUM SERPL-SCNC: 4.4 MMOL/L (ref 3.5–5.2)
POTASSIUM SERPL-SCNC: 4.8 MMOL/L (ref 3.5–5.2)
POTASSIUM SERPL-SCNC: 4.8 MMOL/L (ref 3.5–5.2)
POTASSIUM SERPL-SCNC: 4.9 MMOL/L (ref 3.5–5.2)
POTASSIUM SERPL-SCNC: 5 MMOL/L (ref 3.5–5.2)
POTASSIUM SERPL-SCNC: 5.4 MMOL/L (ref 3.5–5.2)
PROCALCITONIN SERPL-MCNC: 0.06 NG/ML (ref 0–0.25)
PROCALCITONIN SERPL-MCNC: 0.08 NG/ML (ref 0–0.25)
PROT SERPL-MCNC: 5 G/DL (ref 6–8.5)
PROT SERPL-MCNC: 5.9 G/DL (ref 6–8.5)
PROT SERPL-MCNC: 6.5 G/DL (ref 6–8.5)
PROT SERPL-MCNC: 6.8 G/DL (ref 6–8.5)
PROT SERPL-MCNC: 7.5 G/DL (ref 6–8.5)
PROT SERPL-MCNC: 7.7 G/DL (ref 6–8.5)
PROT SERPL-MCNC: 8 G/DL (ref 6–8.5)
PROT UR QL STRIP: ABNORMAL
PROT UR QL STRIP: ABNORMAL
PROTHROMBIN TIME: 13.4 SECONDS (ref 11.7–14.2)
PROTHROMBIN TIME: 21.8 SECONDS (ref 11.7–14.2)
QT INTERVAL: 374 MS
QT INTERVAL: 394 MS
QT INTERVAL: 435 MS
QT INTERVAL: 455 MS
QT INTERVAL: 484 MS
QT INTERVAL: 498 MS
QT INTERVAL: 504 MS
QT INTERVAL: 508 MS
RBC # BLD AUTO: 4.76 10*6/MM3 (ref 4.14–5.8)
RBC # BLD AUTO: 4.95 10*6/MM3 (ref 4.14–5.8)
RBC # BLD AUTO: 4.99 10*6/MM3 (ref 4.14–5.8)
RBC # BLD AUTO: 5.02 10*6/MM3 (ref 4.14–5.8)
RBC # BLD AUTO: 5.17 10*6/MM3 (ref 4.14–5.8)
RBC # BLD AUTO: 5.23 10*6/MM3 (ref 4.14–5.8)
RBC # BLD AUTO: 5.33 10*6/MM3 (ref 4.14–5.8)
RBC # BLD AUTO: 5.34 10*6/MM3 (ref 4.14–5.8)
RBC # BLD AUTO: 5.39 10*6/MM3 (ref 4.14–5.8)
RBC # BLD AUTO: 5.54 10*6/MM3 (ref 4.14–5.8)
RBC # BLD AUTO: 5.57 10*6/MM3 (ref 4.14–5.8)
RBC # BLD AUTO: 5.75 10*6/MM3 (ref 4.14–5.8)
RBC # BLD AUTO: 5.79 10*6/MM3 (ref 4.14–5.8)
RBC # BLD AUTO: 6.02 10*6/MM3 (ref 4.14–5.8)
RBC # UR STRIP: ABNORMAL /HPF
RBC # UR STRIP: ABNORMAL /HPF
REF LAB TEST METHOD: ABNORMAL
REF LAB TEST METHOD: ABNORMAL
RH BLD: NEGATIVE
RHINOVIRUS RNA SPEC NAA+PROBE: NOT DETECTED
RHINOVIRUS RNA SPEC NAA+PROBE: NOT DETECTED
RSV RNA NPH QL NAA+NON-PROBE: NOT DETECTED
RSV RNA NPH QL NAA+NON-PROBE: NOT DETECTED
SAO2 % BLDCOA: 90.6 % (ref 92–99)
SAO2 % BLDCOA: 92.1 % (ref 92–99)
SAO2 % BLDCOA: 93 % (ref 92–99)
SAO2 % BLDCOA: 94.1 % (ref 92–99)
SAO2 % BLDCOA: 95.6 % (ref 92–99)
SAO2 % BLDCOA: 97.1 % (ref 92–99)
SAO2 % BLDCOA: 97.3 % (ref 92–99)
SARS-COV-2 ORF1AB RESP QL NAA+PROBE: NOT DETECTED
SARS-COV-2 RNA NPH QL NAA+NON-PROBE: DETECTED
SARS-COV-2 RNA NPH QL NAA+NON-PROBE: NOT DETECTED
SET MECH RESP RATE: 18
SET MECH RESP RATE: 18
SET MECH RESP RATE: 20
SET MECH RESP RATE: 20
SET MECH RESP RATE: 24
SODIUM SERPL-SCNC: 137 MMOL/L (ref 136–145)
SODIUM SERPL-SCNC: 137 MMOL/L (ref 136–145)
SODIUM SERPL-SCNC: 138 MMOL/L (ref 136–145)
SODIUM SERPL-SCNC: 138 MMOL/L (ref 136–145)
SODIUM SERPL-SCNC: 139 MMOL/L (ref 136–145)
SODIUM SERPL-SCNC: 140 MMOL/L (ref 136–145)
SODIUM SERPL-SCNC: 140 MMOL/L (ref 136–145)
SODIUM SERPL-SCNC: 142 MMOL/L (ref 136–145)
SODIUM SERPL-SCNC: 142 MMOL/L (ref 136–145)
SODIUM SERPL-SCNC: 143 MMOL/L (ref 136–145)
SODIUM SERPL-SCNC: 144 MMOL/L (ref 136–145)
SODIUM SERPL-SCNC: 145 MMOL/L (ref 136–145)
SODIUM SERPL-SCNC: 146 MMOL/L (ref 136–145)
SODIUM SERPL-SCNC: 151 MMOL/L (ref 136–145)
SP GR UR STRIP: 1.01 (ref 1–1.03)
SP GR UR STRIP: 1.02 (ref 1–1.03)
SQUAMOUS #/AREA URNS HPF: ABNORMAL /HPF
SQUAMOUS #/AREA URNS HPF: ABNORMAL /HPF
STRESS TARGET HR: 122 BPM
T&S EXPIRATION DATE: NORMAL
T4 FREE SERPL-MCNC: 0.95 NG/DL (ref 0.93–1.7)
TOTAL RATE: 20 BREATHS/MINUTE
TOTAL RATE: 23 BREATHS/MINUTE
TOTAL RATE: 24 BREATHS/MINUTE
TOTAL RATE: 25 BREATHS/MINUTE
TOTAL RATE: 25 BREATHS/MINUTE
TOTAL RATE: 28 BREATHS/MINUTE
TOTAL RATE: 31 BREATHS/MINUTE
TROPONIN T SERPL-MCNC: 0.02 NG/ML (ref 0–0.03)
TROPONIN T SERPL-MCNC: 0.02 NG/ML (ref 0–0.03)
TROPONIN T SERPL-MCNC: <0.01 NG/ML (ref 0–0.03)
TSH SERPL DL<=0.05 MIU/L-ACNC: 1.59 UIU/ML (ref 0.27–4.2)
TSH SERPL DL<=0.05 MIU/L-ACNC: 2.99 UIU/ML (ref 0.27–4.2)
UROBILINOGEN UR QL STRIP: ABNORMAL
UROBILINOGEN UR QL STRIP: ABNORMAL
VENTILATOR MODE: ABNORMAL
VENTILATOR MODE: AC
VT ON VENT VENT: 0.46 ML
VT ON VENT VENT: 0.55 ML
VT ON VENT VENT: 0.55 ML
VT ON VENT VENT: 454 ML
VT ON VENT VENT: 500 ML
VT ON VENT VENT: 643 ML
VT ON VENT VENT: 646 ML
WBC # UR STRIP: ABNORMAL /HPF
WBC # UR STRIP: ABNORMAL /HPF
WBC NRBC COR # BLD: 12.33 10*3/MM3 (ref 3.4–10.8)
WBC NRBC COR # BLD: 13.19 10*3/MM3 (ref 3.4–10.8)
WBC NRBC COR # BLD: 13.33 10*3/MM3 (ref 3.4–10.8)
WBC NRBC COR # BLD: 14.08 10*3/MM3 (ref 3.4–10.8)
WBC NRBC COR # BLD: 15.97 10*3/MM3 (ref 3.4–10.8)
WBC NRBC COR # BLD: 16.32 10*3/MM3 (ref 3.4–10.8)
WBC NRBC COR # BLD: 17.91 10*3/MM3 (ref 3.4–10.8)
WBC NRBC COR # BLD: 7.6 10*3/MM3 (ref 3.4–10.8)
WBC NRBC COR # BLD: 7.86 10*3/MM3 (ref 3.4–10.8)
WBC NRBC COR # BLD: 8.35 10*3/MM3 (ref 3.4–10.8)
WBC NRBC COR # BLD: 8.38 10*3/MM3 (ref 3.4–10.8)
WBC NRBC COR # BLD: 8.65 10*3/MM3 (ref 3.4–10.8)
WBC NRBC COR # BLD: 9.02 10*3/MM3 (ref 3.4–10.8)
WBC NRBC COR # BLD: 9.43 10*3/MM3 (ref 3.4–10.8)
WHOLE BLOOD HOLD SPECIMEN: NORMAL
WHOLE BLOOD HOLD SPECIMEN: NORMAL

## 2022-01-01 PROCEDURE — 94660 CPAP INITIATION&MGMT: CPT

## 2022-01-01 PROCEDURE — 25010000002 FUROSEMIDE PER 20 MG: Performed by: EMERGENCY MEDICINE

## 2022-01-01 PROCEDURE — 5A12012 PERFORMANCE OF CARDIAC OUTPUT, SINGLE, MANUAL: ICD-10-PCS | Performed by: INTERNAL MEDICINE

## 2022-01-01 PROCEDURE — 82962 GLUCOSE BLOOD TEST: CPT

## 2022-01-01 PROCEDURE — 84443 ASSAY THYROID STIM HORMONE: CPT | Performed by: EMERGENCY MEDICINE

## 2022-01-01 PROCEDURE — 86140 C-REACTIVE PROTEIN: CPT | Performed by: INTERNAL MEDICINE

## 2022-01-01 PROCEDURE — 94799 UNLISTED PULMONARY SVC/PX: CPT

## 2022-01-01 PROCEDURE — 84145 PROCALCITONIN (PCT): CPT | Performed by: INTERNAL MEDICINE

## 2022-01-01 PROCEDURE — G0299 HHS/HOSPICE OF RN EA 15 MIN: HCPCS

## 2022-01-01 PROCEDURE — 25010000002 LORAZEPAM PER 2 MG: Performed by: INTERNAL MEDICINE

## 2022-01-01 PROCEDURE — 25010000002 DOPAMINE PER 40 MG: Performed by: INTERNAL MEDICINE

## 2022-01-01 PROCEDURE — C1758 CATHETER, URETERAL: HCPCS | Performed by: UROLOGY

## 2022-01-01 PROCEDURE — 63710000001 INSULIN REGULAR HUMAN PER 5 UNITS: Performed by: INTERNAL MEDICINE

## 2022-01-01 PROCEDURE — 5A1935Z RESPIRATORY VENTILATION, LESS THAN 24 CONSECUTIVE HOURS: ICD-10-PCS | Performed by: INTERNAL MEDICINE

## 2022-01-01 PROCEDURE — 84484 ASSAY OF TROPONIN QUANT: CPT | Performed by: INTERNAL MEDICINE

## 2022-01-01 PROCEDURE — 94760 N-INVAS EAR/PLS OXIMETRY 1: CPT

## 2022-01-01 PROCEDURE — 80307 DRUG TEST PRSMV CHEM ANLYZR: CPT | Performed by: EMERGENCY MEDICINE

## 2022-01-01 PROCEDURE — G0151 HHCP-SERV OF PT,EA 15 MIN: HCPCS

## 2022-01-01 PROCEDURE — 93306 TTE W/DOPPLER COMPLETE: CPT

## 2022-01-01 PROCEDURE — 80076 HEPATIC FUNCTION PANEL: CPT | Performed by: INTERNAL MEDICINE

## 2022-01-01 PROCEDURE — 99291 CRITICAL CARE FIRST HOUR: CPT

## 2022-01-01 PROCEDURE — 80307 DRUG TEST PRSMV CHEM ANLYZR: CPT | Performed by: INTERNAL MEDICINE

## 2022-01-01 PROCEDURE — 92950 HEART/LUNG RESUSCITATION CPR: CPT

## 2022-01-01 PROCEDURE — 99285 EMERGENCY DEPT VISIT HI MDM: CPT

## 2022-01-01 PROCEDURE — 93010 ELECTROCARDIOGRAM REPORT: CPT | Performed by: INTERNAL MEDICINE

## 2022-01-01 PROCEDURE — 25010000002 ENOXAPARIN PER 10 MG: Performed by: INTERNAL MEDICINE

## 2022-01-01 PROCEDURE — 85025 COMPLETE CBC W/AUTO DIFF WBC: CPT | Performed by: PHYSICIAN ASSISTANT

## 2022-01-01 PROCEDURE — 25010000002 FUROSEMIDE PER 20 MG: Performed by: INTERNAL MEDICINE

## 2022-01-01 PROCEDURE — 87147 CULTURE TYPE IMMUNOLOGIC: CPT | Performed by: EMERGENCY MEDICINE

## 2022-01-01 PROCEDURE — 83880 ASSAY OF NATRIURETIC PEPTIDE: CPT | Performed by: INTERNAL MEDICINE

## 2022-01-01 PROCEDURE — 0202U NFCT DS 22 TRGT SARS-COV-2: CPT | Performed by: EMERGENCY MEDICINE

## 2022-01-01 PROCEDURE — 25010000002 IOPAMIDOL 61 % SOLUTION: Performed by: INTERNAL MEDICINE

## 2022-01-01 PROCEDURE — 87106 FUNGI IDENTIFICATION YEAST: CPT | Performed by: SURGERY

## 2022-01-01 PROCEDURE — 25010000002 MORPHINE PER 10 MG: Performed by: INTERNAL MEDICINE

## 2022-01-01 PROCEDURE — 94761 N-INVAS EAR/PLS OXIMETRY MLT: CPT

## 2022-01-01 PROCEDURE — C1751 CATH, INF, PER/CENT/MIDLINE: HCPCS

## 2022-01-01 PROCEDURE — 87070 CULTURE OTHR SPECIMN AEROBIC: CPT | Performed by: INTERNAL MEDICINE

## 2022-01-01 PROCEDURE — 94640 AIRWAY INHALATION TREATMENT: CPT

## 2022-01-01 PROCEDURE — 49406 IMAGE CATH FLUID PERI/RETRO: CPT

## 2022-01-01 PROCEDURE — 25010000002 AMIODARONE PER 30 MG: Performed by: EMERGENCY MEDICINE

## 2022-01-01 PROCEDURE — 87040 BLOOD CULTURE FOR BACTERIA: CPT | Performed by: INTERNAL MEDICINE

## 2022-01-01 PROCEDURE — 5A1935Z RESPIRATORY VENTILATION, LESS THAN 24 CONSECUTIVE HOURS: ICD-10-PCS | Performed by: EMERGENCY MEDICINE

## 2022-01-01 PROCEDURE — XW033E5 INTRODUCTION OF REMDESIVIR ANTI-INFECTIVE INTO PERIPHERAL VEIN, PERCUTANEOUS APPROACH, NEW TECHNOLOGY GROUP 5: ICD-10-PCS | Performed by: INTERNAL MEDICINE

## 2022-01-01 PROCEDURE — 0202U NFCT DS 22 TRGT SARS-COV-2: CPT | Performed by: PHYSICIAN ASSISTANT

## 2022-01-01 PROCEDURE — 36600 WITHDRAWAL OF ARTERIAL BLOOD: CPT

## 2022-01-01 PROCEDURE — 87075 CULTR BACTERIA EXCEPT BLOOD: CPT | Performed by: SURGERY

## 2022-01-01 PROCEDURE — 84100 ASSAY OF PHOSPHORUS: CPT | Performed by: INTERNAL MEDICINE

## 2022-01-01 PROCEDURE — 74018 RADEX ABDOMEN 1 VIEW: CPT

## 2022-01-01 PROCEDURE — 06HY33Z INSERTION OF INFUSION DEVICE INTO LOWER VEIN, PERCUTANEOUS APPROACH: ICD-10-PCS | Performed by: INTERNAL MEDICINE

## 2022-01-01 PROCEDURE — 80053 COMPREHEN METABOLIC PANEL: CPT | Performed by: INTERNAL MEDICINE

## 2022-01-01 PROCEDURE — 84145 PROCALCITONIN (PCT): CPT | Performed by: EMERGENCY MEDICINE

## 2022-01-01 PROCEDURE — 83605 ASSAY OF LACTIC ACID: CPT | Performed by: EMERGENCY MEDICINE

## 2022-01-01 PROCEDURE — 83036 HEMOGLOBIN GLYCOSYLATED A1C: CPT | Performed by: INTERNAL MEDICINE

## 2022-01-01 PROCEDURE — 80069 RENAL FUNCTION PANEL: CPT | Performed by: INTERNAL MEDICINE

## 2022-01-01 PROCEDURE — 86900 BLOOD TYPING SEROLOGIC ABO: CPT | Performed by: INTERNAL MEDICINE

## 2022-01-01 PROCEDURE — 83735 ASSAY OF MAGNESIUM: CPT | Performed by: EMERGENCY MEDICINE

## 2022-01-01 PROCEDURE — U0004 COV-19 TEST NON-CDC HGH THRU: HCPCS | Performed by: HOSPITALIST

## 2022-01-01 PROCEDURE — 85027 COMPLETE CBC AUTOMATED: CPT | Performed by: INTERNAL MEDICINE

## 2022-01-01 PROCEDURE — G0152 HHCP-SERV OF OT,EA 15 MIN: HCPCS

## 2022-01-01 PROCEDURE — 25010000002 PIPERACILLIN SOD-TAZOBACTAM PER 1 G: Performed by: INTERNAL MEDICINE

## 2022-01-01 PROCEDURE — 85610 PROTHROMBIN TIME: CPT | Performed by: INTERNAL MEDICINE

## 2022-01-01 PROCEDURE — 25010000002 DEXAMETHASONE PER 1 MG: Performed by: INTERNAL MEDICINE

## 2022-01-01 PROCEDURE — 87205 SMEAR GRAM STAIN: CPT | Performed by: INTERNAL MEDICINE

## 2022-01-01 PROCEDURE — 36430 TRANSFUSION BLD/BLD COMPNT: CPT

## 2022-01-01 PROCEDURE — 0BH18EZ INSERTION OF ENDOTRACHEAL AIRWAY INTO TRACHEA, VIA NATURAL OR ARTIFICIAL OPENING ENDOSCOPIC: ICD-10-PCS | Performed by: EMERGENCY MEDICINE

## 2022-01-01 PROCEDURE — 99222 1ST HOSP IP/OBS MODERATE 55: CPT | Performed by: INTERNAL MEDICINE

## 2022-01-01 PROCEDURE — 25010000002 REMDESIVIR 100 MG/20ML SOLUTION 1 EACH VIAL: Performed by: INTERNAL MEDICINE

## 2022-01-01 PROCEDURE — C1769 GUIDE WIRE: HCPCS | Performed by: UROLOGY

## 2022-01-01 PROCEDURE — 86901 BLOOD TYPING SEROLOGIC RH(D): CPT

## 2022-01-01 PROCEDURE — 25010000002 METHYLPREDNISOLONE PER 125 MG: Performed by: PHYSICIAN ASSISTANT

## 2022-01-01 PROCEDURE — 71045 X-RAY EXAM CHEST 1 VIEW: CPT

## 2022-01-01 PROCEDURE — 25010000002 VANCOMYCIN 10 G RECONSTITUTED SOLUTION: Performed by: INTERNAL MEDICINE

## 2022-01-01 PROCEDURE — 81001 URINALYSIS AUTO W/SCOPE: CPT | Performed by: PHYSICIAN ASSISTANT

## 2022-01-01 PROCEDURE — 82077 ASSAY SPEC XCP UR&BREATH IA: CPT | Performed by: EMERGENCY MEDICINE

## 2022-01-01 PROCEDURE — 25010000002 ATROPINE PER 0.01 MG: Performed by: INTERNAL MEDICINE

## 2022-01-01 PROCEDURE — 83880 ASSAY OF NATRIURETIC PEPTIDE: CPT | Performed by: PHYSICIAN ASSISTANT

## 2022-01-01 PROCEDURE — 87205 SMEAR GRAM STAIN: CPT | Performed by: SURGERY

## 2022-01-01 PROCEDURE — 25010000002 FLUCONAZOLE PER 200 MG: Performed by: INTERNAL MEDICINE

## 2022-01-01 PROCEDURE — 99232 SBSQ HOSP IP/OBS MODERATE 35: CPT | Performed by: INTERNAL MEDICINE

## 2022-01-01 PROCEDURE — 25010000002 METHYLPREDNISOLONE PER 125 MG: Performed by: EMERGENCY MEDICINE

## 2022-01-01 PROCEDURE — C1729 CATH, DRAINAGE: HCPCS

## 2022-01-01 PROCEDURE — 83605 ASSAY OF LACTIC ACID: CPT | Performed by: INTERNAL MEDICINE

## 2022-01-01 PROCEDURE — 25010000002 ATROPINE PER 0.01 MG: Performed by: EMERGENCY MEDICINE

## 2022-01-01 PROCEDURE — 83880 ASSAY OF NATRIURETIC PEPTIDE: CPT | Performed by: EMERGENCY MEDICINE

## 2022-01-01 PROCEDURE — 86920 COMPATIBILITY TEST SPIN: CPT

## 2022-01-01 PROCEDURE — 25010000002 FUROSEMIDE PER 20 MG: Performed by: PHYSICIAN ASSISTANT

## 2022-01-01 PROCEDURE — 80053 COMPREHEN METABOLIC PANEL: CPT | Performed by: EMERGENCY MEDICINE

## 2022-01-01 PROCEDURE — 0 IOPAMIDOL PER 1 ML: Performed by: INTERNAL MEDICINE

## 2022-01-01 PROCEDURE — 84484 ASSAY OF TROPONIN QUANT: CPT | Performed by: EMERGENCY MEDICINE

## 2022-01-01 PROCEDURE — 82803 BLOOD GASES ANY COMBINATION: CPT

## 2022-01-01 PROCEDURE — 93306 TTE W/DOPPLER COMPLETE: CPT | Performed by: INTERNAL MEDICINE

## 2022-01-01 PROCEDURE — 25010000002 PERFLUTREN (DEFINITY) 8.476 MG IN SODIUM CHLORIDE (PF) 0.9 % 10 ML INJECTION: Performed by: INTERNAL MEDICINE

## 2022-01-01 PROCEDURE — 84132 ASSAY OF SERUM POTASSIUM: CPT | Performed by: INTERNAL MEDICINE

## 2022-01-01 PROCEDURE — 93005 ELECTROCARDIOGRAM TRACING: CPT | Performed by: PHYSICIAN ASSISTANT

## 2022-01-01 PROCEDURE — 25010000002 PIPERACILLIN SOD-TAZOBACTAM PER 1 G: Performed by: SURGERY

## 2022-01-01 PROCEDURE — 25010000002 VANCOMYCIN 10 G RECONSTITUTED SOLUTION: Performed by: EMERGENCY MEDICINE

## 2022-01-01 PROCEDURE — 90686 IIV4 VACC NO PRSV 0.5 ML IM: CPT | Performed by: HOSPITALIST

## 2022-01-01 PROCEDURE — G0008 ADMIN INFLUENZA VIRUS VAC: HCPCS | Performed by: HOSPITALIST

## 2022-01-01 PROCEDURE — 82248 BILIRUBIN DIRECT: CPT | Performed by: INTERNAL MEDICINE

## 2022-01-01 PROCEDURE — 0 DIATRIZOATE MEGLUMINE & SODIUM PER 1 ML: Performed by: INTERNAL MEDICINE

## 2022-01-01 PROCEDURE — 25010000002 PROPOFOL 10 MG/ML EMULSION: Performed by: INTERNAL MEDICINE

## 2022-01-01 PROCEDURE — 86850 RBC ANTIBODY SCREEN: CPT | Performed by: INTERNAL MEDICINE

## 2022-01-01 PROCEDURE — 86900 BLOOD TYPING SEROLOGIC ABO: CPT

## 2022-01-01 PROCEDURE — 93005 ELECTROCARDIOGRAM TRACING: CPT | Performed by: INTERNAL MEDICINE

## 2022-01-01 PROCEDURE — 99222 1ST HOSP IP/OBS MODERATE 55: CPT | Performed by: SURGERY

## 2022-01-01 PROCEDURE — 93005 ELECTROCARDIOGRAM TRACING: CPT | Performed by: NURSE PRACTITIONER

## 2022-01-01 PROCEDURE — 85025 COMPLETE CBC W/AUTO DIFF WBC: CPT | Performed by: EMERGENCY MEDICINE

## 2022-01-01 PROCEDURE — 25010000002 HYDRALAZINE PER 20 MG: Performed by: INTERNAL MEDICINE

## 2022-01-01 PROCEDURE — 25010000002 FENTANYL CITRATE (PF) 50 MCG/ML SOLUTION: Performed by: INTERNAL MEDICINE

## 2022-01-01 PROCEDURE — 87070 CULTURE OTHR SPECIMN AEROBIC: CPT | Performed by: SURGERY

## 2022-01-01 PROCEDURE — 97162 PT EVAL MOD COMPLEX 30 MIN: CPT

## 2022-01-01 PROCEDURE — 93005 ELECTROCARDIOGRAM TRACING: CPT | Performed by: EMERGENCY MEDICINE

## 2022-01-01 PROCEDURE — 25010000002 EPINEPHRINE 1 MG/10ML SOLUTION PREFILLED SYRINGE: Performed by: EMERGENCY MEDICINE

## 2022-01-01 PROCEDURE — 87040 BLOOD CULTURE FOR BACTERIA: CPT | Performed by: EMERGENCY MEDICINE

## 2022-01-01 PROCEDURE — 80053 COMPREHEN METABOLIC PANEL: CPT | Performed by: PHYSICIAN ASSISTANT

## 2022-01-01 PROCEDURE — 25010000002 PIPERACILLIN SOD-TAZOBACTAM PER 1 G: Performed by: EMERGENCY MEDICINE

## 2022-01-01 PROCEDURE — 75989 ABSCESS DRAINAGE UNDER X-RAY: CPT

## 2022-01-01 PROCEDURE — 97530 THERAPEUTIC ACTIVITIES: CPT

## 2022-01-01 PROCEDURE — 94002 VENT MGMT INPAT INIT DAY: CPT

## 2022-01-01 PROCEDURE — 36415 COLL VENOUS BLD VENIPUNCTURE: CPT

## 2022-01-01 PROCEDURE — 25010000002 MORPHINE SULFATE (PF) 2 MG/ML SOLUTION: Performed by: INTERNAL MEDICINE

## 2022-01-01 PROCEDURE — 86923 COMPATIBILITY TEST ELECTRIC: CPT

## 2022-01-01 PROCEDURE — 74177 CT ABD & PELVIS W/CONTRAST: CPT

## 2022-01-01 PROCEDURE — 85379 FIBRIN DEGRADATION QUANT: CPT | Performed by: INTERNAL MEDICINE

## 2022-01-01 PROCEDURE — 85610 PROTHROMBIN TIME: CPT | Performed by: EMERGENCY MEDICINE

## 2022-01-01 PROCEDURE — 36415 COLL VENOUS BLD VENIPUNCTURE: CPT | Performed by: INTERNAL MEDICINE

## 2022-01-01 PROCEDURE — 84439 ASSAY OF FREE THYROXINE: CPT | Performed by: EMERGENCY MEDICINE

## 2022-01-01 PROCEDURE — P9016 RBC LEUKOCYTES REDUCED: HCPCS

## 2022-01-01 PROCEDURE — 99221 1ST HOSP IP/OBS SF/LOW 40: CPT | Performed by: INTERNAL MEDICINE

## 2022-01-01 PROCEDURE — 25010000002 EPINEPHRINE 1 MG/10ML SOLUTION PREFILLED SYRINGE: Performed by: INTERNAL MEDICINE

## 2022-01-01 PROCEDURE — 87186 SC STD MICRODIL/AGAR DIL: CPT | Performed by: SURGERY

## 2022-01-01 PROCEDURE — 81001 URINALYSIS AUTO W/SCOPE: CPT | Performed by: EMERGENCY MEDICINE

## 2022-01-01 PROCEDURE — 0BH17EZ INSERTION OF ENDOTRACHEAL AIRWAY INTO TRACHEA, VIA NATURAL OR ARTIFICIAL OPENING: ICD-10-PCS | Performed by: INTERNAL MEDICINE

## 2022-01-01 PROCEDURE — 87641 MR-STAPH DNA AMP PROBE: CPT | Performed by: INTERNAL MEDICINE

## 2022-01-01 PROCEDURE — 25010000002 EPINEPHRINE 1 MG/ML SOLUTION 30 ML VIAL: Performed by: INTERNAL MEDICINE

## 2022-01-01 PROCEDURE — 0 LIDOCAINE 1 % SOLUTION: Performed by: RADIOLOGY

## 2022-01-01 PROCEDURE — 99232 SBSQ HOSP IP/OBS MODERATE 35: CPT | Performed by: NURSE PRACTITIONER

## 2022-01-01 PROCEDURE — 97116 GAIT TRAINING THERAPY: CPT

## 2022-01-01 PROCEDURE — 87150 DNA/RNA AMPLIFIED PROBE: CPT | Performed by: EMERGENCY MEDICINE

## 2022-01-01 PROCEDURE — 70450 CT HEAD/BRAIN W/O DYE: CPT

## 2022-01-01 PROCEDURE — 0W9G30Z DRAINAGE OF PERITONEAL CAVITY WITH DRAINAGE DEVICE, PERCUTANEOUS APPROACH: ICD-10-PCS | Performed by: INTERNAL MEDICINE

## 2022-01-01 PROCEDURE — 85025 COMPLETE CBC W/AUTO DIFF WBC: CPT | Performed by: INTERNAL MEDICINE

## 2022-01-01 PROCEDURE — 25010000002 INFLUENZA VAC SPLIT QUAD 0.5 ML SUSPENSION PREFILLED SYRINGE: Performed by: HOSPITALIST

## 2022-01-01 PROCEDURE — 71275 CT ANGIOGRAPHY CHEST: CPT

## 2022-01-01 PROCEDURE — 82565 ASSAY OF CREATININE: CPT | Performed by: INTERNAL MEDICINE

## 2022-01-01 PROCEDURE — 86901 BLOOD TYPING SEROLOGIC RH(D): CPT | Performed by: INTERNAL MEDICINE

## 2022-01-01 PROCEDURE — 80048 BASIC METABOLIC PNL TOTAL CA: CPT | Performed by: INTERNAL MEDICINE

## 2022-01-01 PROCEDURE — 83735 ASSAY OF MAGNESIUM: CPT | Performed by: INTERNAL MEDICINE

## 2022-01-01 PROCEDURE — 84484 ASSAY OF TROPONIN QUANT: CPT | Performed by: PHYSICIAN ASSISTANT

## 2022-01-01 PROCEDURE — 83735 ASSAY OF MAGNESIUM: CPT | Performed by: NURSE PRACTITIONER

## 2022-01-01 PROCEDURE — 84443 ASSAY THYROID STIM HORMONE: CPT | Performed by: INTERNAL MEDICINE

## 2022-01-01 RX ORDER — FUROSEMIDE 10 MG/ML
80 INJECTION INTRAMUSCULAR; INTRAVENOUS ONCE
Status: COMPLETED | OUTPATIENT
Start: 2022-01-01 | End: 2022-01-01

## 2022-01-01 RX ORDER — FUROSEMIDE 10 MG/ML
40 INJECTION INTRAMUSCULAR; INTRAVENOUS EVERY 12 HOURS
Status: DISCONTINUED | OUTPATIENT
Start: 2022-01-01 | End: 2022-01-01

## 2022-01-01 RX ORDER — DEXTROSE MONOHYDRATE 25 G/50ML
25 INJECTION, SOLUTION INTRAVENOUS
Status: DISCONTINUED | OUTPATIENT
Start: 2022-01-01 | End: 2022-01-01 | Stop reason: HOSPADM

## 2022-01-01 RX ORDER — POTASSIUM CHLORIDE 1.5 G/1.77G
40 POWDER, FOR SOLUTION ORAL AS NEEDED
Status: DISCONTINUED | OUTPATIENT
Start: 2022-01-01 | End: 2022-01-01 | Stop reason: HOSPADM

## 2022-01-01 RX ORDER — METHYLPREDNISOLONE SODIUM SUCCINATE 125 MG/2ML
125 INJECTION, POWDER, LYOPHILIZED, FOR SOLUTION INTRAMUSCULAR; INTRAVENOUS ONCE
Status: COMPLETED | OUTPATIENT
Start: 2022-01-01 | End: 2022-01-01

## 2022-01-01 RX ORDER — NICOTINE POLACRILEX 4 MG
15 LOZENGE BUCCAL
Status: DISCONTINUED | OUTPATIENT
Start: 2022-01-01 | End: 2022-02-18 | Stop reason: HOSPADM

## 2022-01-01 RX ORDER — SODIUM CHLORIDE 0.9 % (FLUSH) 0.9 %
10 SYRINGE (ML) INJECTION AS NEEDED
Status: DISCONTINUED | OUTPATIENT
Start: 2022-01-01 | End: 2022-02-18 | Stop reason: HOSPADM

## 2022-01-01 RX ORDER — IPRATROPIUM BROMIDE AND ALBUTEROL SULFATE 2.5; .5 MG/3ML; MG/3ML
3 SOLUTION RESPIRATORY (INHALATION) EVERY 6 HOURS PRN
Status: DISCONTINUED | OUTPATIENT
Start: 2022-01-01 | End: 2022-01-01 | Stop reason: HOSPADM

## 2022-01-01 RX ORDER — ONDANSETRON 2 MG/ML
4 INJECTION INTRAMUSCULAR; INTRAVENOUS EVERY 6 HOURS PRN
Status: DISCONTINUED | OUTPATIENT
Start: 2022-01-01 | End: 2022-01-01 | Stop reason: HOSPADM

## 2022-01-01 RX ORDER — POTASSIUM CHLORIDE 7.45 MG/ML
10 INJECTION INTRAVENOUS
Status: DISCONTINUED | OUTPATIENT
Start: 2022-01-01 | End: 2022-01-01 | Stop reason: HOSPADM

## 2022-01-01 RX ORDER — PROPOFOL 10 MG/ML
5 VIAL (ML) INTRAVENOUS ONCE
Status: COMPLETED | OUTPATIENT
Start: 2022-01-01 | End: 2022-01-01

## 2022-01-01 RX ORDER — CALCIUM GLUCONATE 20 MG/ML
1 INJECTION, SOLUTION INTRAVENOUS AS NEEDED
Status: DISCONTINUED | OUTPATIENT
Start: 2022-01-01 | End: 2022-01-01 | Stop reason: HOSPADM

## 2022-01-01 RX ORDER — ARFORMOTEROL TARTRATE 15 UG/2ML
15 SOLUTION RESPIRATORY (INHALATION)
Qty: 120 ML
Start: 2022-01-01

## 2022-01-01 RX ORDER — POTASSIUM CHLORIDE 750 MG/1
40 TABLET, FILM COATED, EXTENDED RELEASE ORAL AS NEEDED
Status: DISCONTINUED | OUTPATIENT
Start: 2022-01-01 | End: 2022-01-01 | Stop reason: HOSPADM

## 2022-01-01 RX ORDER — PETROLATUM 420 MG/G
1 OINTMENT TOPICAL EVERY 12 HOURS SCHEDULED
Status: DISCONTINUED | OUTPATIENT
Start: 2022-01-01 | End: 2022-01-01 | Stop reason: HOSPADM

## 2022-01-01 RX ORDER — SODIUM CHLORIDE 9 MG/ML
INJECTION, SOLUTION INTRAVENOUS
Status: COMPLETED | OUTPATIENT
Start: 2022-01-01 | End: 2022-01-01

## 2022-01-01 RX ORDER — ONDANSETRON 4 MG/1
4 TABLET, FILM COATED ORAL EVERY 6 HOURS PRN
Status: DISCONTINUED | OUTPATIENT
Start: 2022-01-01 | End: 2022-01-01 | Stop reason: HOSPADM

## 2022-01-01 RX ORDER — ATORVASTATIN CALCIUM 80 MG/1
80 TABLET, FILM COATED ORAL DAILY
Status: DISCONTINUED | OUTPATIENT
Start: 2022-01-01 | End: 2022-01-01 | Stop reason: HOSPADM

## 2022-01-01 RX ORDER — BUDESONIDE 0.5 MG/2ML
1 INHALANT ORAL
Start: 2022-01-01

## 2022-01-01 RX ORDER — ACETAMINOPHEN 650 MG/1
650 SUPPOSITORY RECTAL EVERY 4 HOURS PRN
Status: DISCONTINUED | OUTPATIENT
Start: 2022-01-01 | End: 2022-02-18 | Stop reason: HOSPADM

## 2022-01-01 RX ORDER — DEXTROSE MONOHYDRATE 25 G/50ML
25 INJECTION, SOLUTION INTRAVENOUS
Status: DISCONTINUED | OUTPATIENT
Start: 2022-01-01 | End: 2022-02-18 | Stop reason: HOSPADM

## 2022-01-01 RX ORDER — LORAZEPAM 2 MG/ML
2 INJECTION INTRAMUSCULAR
Status: DISCONTINUED | OUTPATIENT
Start: 2022-01-01 | End: 2022-02-18 | Stop reason: HOSPADM

## 2022-01-01 RX ORDER — NITROGLYCERIN 20 MG/100ML
5-200 INJECTION INTRAVENOUS
Status: DISCONTINUED | OUTPATIENT
Start: 2022-01-01 | End: 2022-01-01

## 2022-01-01 RX ORDER — FUROSEMIDE 10 MG/ML
40 INJECTION INTRAMUSCULAR; INTRAVENOUS ONCE
Status: COMPLETED | OUTPATIENT
Start: 2022-01-01 | End: 2022-01-01

## 2022-01-01 RX ORDER — AMIODARONE HYDROCHLORIDE 50 MG/ML
INJECTION, SOLUTION INTRAVENOUS
Status: COMPLETED | OUTPATIENT
Start: 2022-01-01 | End: 2022-01-01

## 2022-01-01 RX ORDER — PETROLATUM 420 MG/G
1 OINTMENT TOPICAL EVERY 12 HOURS SCHEDULED
Start: 2022-01-01

## 2022-01-01 RX ORDER — NICOTINE POLACRILEX 4 MG
15 LOZENGE BUCCAL
Status: DISCONTINUED | OUTPATIENT
Start: 2022-01-01 | End: 2022-01-01 | Stop reason: HOSPADM

## 2022-01-01 RX ORDER — ACETAMINOPHEN 160 MG/5ML
650 SOLUTION ORAL EVERY 4 HOURS PRN
Status: DISCONTINUED | OUTPATIENT
Start: 2022-01-01 | End: 2022-02-18 | Stop reason: HOSPADM

## 2022-01-01 RX ORDER — NOREPINEPHRINE BIT/0.9 % NACL 8 MG/250ML
.02-.3 INFUSION BOTTLE (ML) INTRAVENOUS
Status: DISCONTINUED | OUTPATIENT
Start: 2022-01-01 | End: 2022-02-18 | Stop reason: HOSPADM

## 2022-01-01 RX ORDER — METOPROLOL SUCCINATE 25 MG/1
25 TABLET, EXTENDED RELEASE ORAL
Qty: 90 TABLET | Refills: 3 | Status: SHIPPED | OUTPATIENT
Start: 2022-01-01

## 2022-01-01 RX ORDER — SODIUM CHLORIDE 0.9 % (FLUSH) 0.9 %
10 SYRINGE (ML) INJECTION EVERY 12 HOURS SCHEDULED
Status: DISCONTINUED | OUTPATIENT
Start: 2022-01-01 | End: 2022-01-01 | Stop reason: HOSPADM

## 2022-01-01 RX ORDER — DEXAMETHASONE SODIUM PHOSPHATE 10 MG/ML
6 INJECTION INTRAMUSCULAR; INTRAVENOUS DAILY
Status: DISCONTINUED | OUTPATIENT
Start: 2022-01-01 | End: 2022-02-18 | Stop reason: HOSPADM

## 2022-01-01 RX ORDER — EPINEPHRINE 0.1 MG/ML
SYRINGE (ML) INJECTION
Status: COMPLETED | OUTPATIENT
Start: 2022-01-01 | End: 2022-01-01

## 2022-01-01 RX ORDER — MAGNESIUM SULFATE HEPTAHYDRATE 40 MG/ML
2 INJECTION, SOLUTION INTRAVENOUS AS NEEDED
Status: DISCONTINUED | OUTPATIENT
Start: 2022-01-01 | End: 2022-01-01 | Stop reason: HOSPADM

## 2022-01-01 RX ORDER — MORPHINE SULFATE 2 MG/ML
2 INJECTION, SOLUTION INTRAMUSCULAR; INTRAVENOUS ONCE
Status: COMPLETED | OUTPATIENT
Start: 2022-01-01 | End: 2022-01-01

## 2022-01-01 RX ORDER — ATROPINE SULFATE 0.4 MG/ML
0.4 AMPUL (ML) INJECTION ONCE
Status: COMPLETED | OUTPATIENT
Start: 2022-01-01 | End: 2022-01-01

## 2022-01-01 RX ORDER — CHLORHEXIDINE GLUCONATE 0.12 MG/ML
15 RINSE ORAL EVERY 12 HOURS SCHEDULED
Status: DISCONTINUED | OUTPATIENT
Start: 2022-01-01 | End: 2022-01-01

## 2022-01-01 RX ORDER — LISINOPRIL 40 MG/1
40 TABLET ORAL
Status: DISCONTINUED | OUTPATIENT
Start: 2022-01-01 | End: 2022-01-01

## 2022-01-01 RX ORDER — LORAZEPAM 2 MG/ML
2 CONCENTRATE ORAL
Status: DISCONTINUED | OUTPATIENT
Start: 2022-01-01 | End: 2022-02-18 | Stop reason: HOSPADM

## 2022-01-01 RX ORDER — ALBUTEROL SULFATE 90 UG/1
6 AEROSOL, METERED RESPIRATORY (INHALATION)
Status: DISCONTINUED | OUTPATIENT
Start: 2022-01-01 | End: 2022-01-01

## 2022-01-01 RX ORDER — BUMETANIDE 0.25 MG/ML
1 INJECTION INTRAMUSCULAR; INTRAVENOUS ONCE
Status: COMPLETED | OUTPATIENT
Start: 2022-01-01 | End: 2022-01-01

## 2022-01-01 RX ORDER — DOPAMINE HYDROCHLORIDE 160 MG/100ML
2-20 INJECTION, SOLUTION INTRAVENOUS
Status: DISCONTINUED | OUTPATIENT
Start: 2022-01-01 | End: 2022-01-01

## 2022-01-01 RX ORDER — METOPROLOL SUCCINATE 25 MG/1
25 TABLET, EXTENDED RELEASE ORAL
Status: DISCONTINUED | OUTPATIENT
Start: 2022-01-01 | End: 2022-01-01 | Stop reason: HOSPADM

## 2022-01-01 RX ORDER — FLUCONAZOLE 2 MG/ML
400 INJECTION, SOLUTION INTRAVENOUS EVERY 24 HOURS
Status: DISCONTINUED | OUTPATIENT
Start: 2022-01-01 | End: 2022-02-18 | Stop reason: HOSPADM

## 2022-01-01 RX ORDER — HYDROCODONE BITARTRATE AND ACETAMINOPHEN 5; 325 MG/1; MG/1
1 TABLET ORAL EVERY 6 HOURS PRN
Status: ACTIVE | OUTPATIENT
Start: 2022-01-01 | End: 2022-01-01

## 2022-01-01 RX ORDER — LIDOCAINE HYDROCHLORIDE 10 MG/ML
20 INJECTION, SOLUTION INFILTRATION; PERINEURAL ONCE
Status: COMPLETED | OUTPATIENT
Start: 2022-01-01 | End: 2022-01-01

## 2022-01-01 RX ORDER — FUROSEMIDE 40 MG/1
40 TABLET ORAL 2 TIMES DAILY
Start: 2022-01-01

## 2022-01-01 RX ORDER — ATROPINE SULFATE 1 MG/ML
INJECTION, SOLUTION INTRAMUSCULAR; INTRAVENOUS; SUBCUTANEOUS
Status: COMPLETED | OUTPATIENT
Start: 2022-01-01 | End: 2022-01-01

## 2022-01-01 RX ORDER — IPRATROPIUM BROMIDE AND ALBUTEROL SULFATE 2.5; .5 MG/3ML; MG/3ML
3 SOLUTION RESPIRATORY (INHALATION) EVERY 6 HOURS PRN
Qty: 360 ML
Start: 2022-01-01

## 2022-01-01 RX ORDER — FUROSEMIDE 40 MG/1
40 TABLET ORAL
Status: DISCONTINUED | OUTPATIENT
Start: 2022-01-01 | End: 2022-01-01 | Stop reason: HOSPADM

## 2022-01-01 RX ORDER — FAMOTIDINE 10 MG/ML
20 INJECTION, SOLUTION INTRAVENOUS 2 TIMES DAILY
Status: DISCONTINUED | OUTPATIENT
Start: 2022-01-01 | End: 2022-01-01

## 2022-01-01 RX ORDER — MAGNESIUM SULFATE HEPTAHYDRATE 40 MG/ML
4 INJECTION, SOLUTION INTRAVENOUS AS NEEDED
Status: DISCONTINUED | OUTPATIENT
Start: 2022-01-01 | End: 2022-01-01 | Stop reason: HOSPADM

## 2022-01-01 RX ORDER — LISINOPRIL 20 MG/1
20 TABLET ORAL
Status: DISCONTINUED | OUTPATIENT
Start: 2022-01-01 | End: 2022-01-01 | Stop reason: HOSPADM

## 2022-01-01 RX ORDER — IPRATROPIUM BROMIDE AND ALBUTEROL SULFATE 2.5; .5 MG/3ML; MG/3ML
3 SOLUTION RESPIRATORY (INHALATION) EVERY 6 HOURS PRN
Status: DISCONTINUED | OUTPATIENT
Start: 2022-01-01 | End: 2022-02-18 | Stop reason: HOSPADM

## 2022-01-01 RX ORDER — FENTANYL CITRATE 50 UG/ML
25 INJECTION, SOLUTION INTRAMUSCULAR; INTRAVENOUS
Status: DISPENSED | OUTPATIENT
Start: 2022-01-01 | End: 2022-01-01

## 2022-01-01 RX ORDER — SODIUM CHLORIDE 0.9 % (FLUSH) 0.9 %
10 SYRINGE (ML) INJECTION AS NEEDED
Status: DISCONTINUED | OUTPATIENT
Start: 2022-01-01 | End: 2022-01-01 | Stop reason: HOSPADM

## 2022-01-01 RX ORDER — ACETAMINOPHEN 325 MG/1
650 TABLET ORAL EVERY 4 HOURS PRN
Status: DISCONTINUED | OUTPATIENT
Start: 2022-01-01 | End: 2022-01-01 | Stop reason: HOSPADM

## 2022-01-01 RX ORDER — MORPHINE SULFATE 2 MG/ML
5 INJECTION, SOLUTION INTRAMUSCULAR; INTRAVENOUS
Status: DISCONTINUED | OUTPATIENT
Start: 2022-01-01 | End: 2022-02-18 | Stop reason: HOSPADM

## 2022-01-01 RX ORDER — DIPHENOXYLATE HYDROCHLORIDE AND ATROPINE SULFATE 2.5; .025 MG/1; MG/1
1 TABLET ORAL
Status: DISCONTINUED | OUTPATIENT
Start: 2022-01-01 | End: 2022-02-18 | Stop reason: HOSPADM

## 2022-01-01 RX ORDER — SODIUM CHLORIDE 0.9 % (FLUSH) 0.9 %
10 SYRINGE (ML) INJECTION EVERY 12 HOURS SCHEDULED
Status: DISCONTINUED | OUTPATIENT
Start: 2022-01-01 | End: 2022-02-18 | Stop reason: HOSPADM

## 2022-01-01 RX ORDER — SODIUM CHLORIDE 9 MG/ML
50 INJECTION, SOLUTION INTRAVENOUS CONTINUOUS
Status: ACTIVE | OUTPATIENT
Start: 2022-01-01 | End: 2022-01-01

## 2022-01-01 RX ORDER — LISINOPRIL 20 MG/1
20 TABLET ORAL
Start: 2022-01-01

## 2022-01-01 RX ORDER — BUDESONIDE 0.5 MG/2ML
1 INHALANT ORAL
Status: DISCONTINUED | OUTPATIENT
Start: 2022-01-01 | End: 2022-01-01 | Stop reason: HOSPADM

## 2022-01-01 RX ORDER — ACETAMINOPHEN 325 MG/1
650 TABLET ORAL EVERY 4 HOURS PRN
Status: DISCONTINUED | OUTPATIENT
Start: 2022-01-01 | End: 2022-02-18 | Stop reason: HOSPADM

## 2022-01-01 RX ORDER — HYDRALAZINE HYDROCHLORIDE 20 MG/ML
10 INJECTION INTRAMUSCULAR; INTRAVENOUS EVERY 4 HOURS PRN
Status: DISCONTINUED | OUTPATIENT
Start: 2022-01-01 | End: 2022-01-01 | Stop reason: HOSPADM

## 2022-01-01 RX ORDER — FUROSEMIDE 10 MG/ML
80 INJECTION INTRAMUSCULAR; INTRAVENOUS
Status: DISCONTINUED | OUTPATIENT
Start: 2022-01-01 | End: 2022-01-01

## 2022-01-01 RX ORDER — LISINOPRIL 40 MG/1
40 TABLET ORAL
Start: 2022-01-01 | End: 2022-01-01 | Stop reason: HOSPADM

## 2022-01-01 RX ORDER — NITROGLYCERIN 0.4 MG/1
0.4 TABLET SUBLINGUAL
Status: DISCONTINUED | OUTPATIENT
Start: 2022-01-01 | End: 2022-02-18 | Stop reason: HOSPADM

## 2022-01-01 RX ORDER — ARFORMOTEROL TARTRATE 15 UG/2ML
15 SOLUTION RESPIRATORY (INHALATION)
Status: DISCONTINUED | OUTPATIENT
Start: 2022-01-01 | End: 2022-01-01 | Stop reason: HOSPADM

## 2022-01-01 RX ORDER — FENTANYL CITRATE 50 UG/ML
50 INJECTION, SOLUTION INTRAMUSCULAR; INTRAVENOUS
Status: DISCONTINUED | OUTPATIENT
Start: 2022-01-01 | End: 2022-02-18 | Stop reason: HOSPADM

## 2022-01-01 RX ORDER — PANTOPRAZOLE SODIUM 40 MG/10ML
80 INJECTION, POWDER, LYOPHILIZED, FOR SOLUTION INTRAVENOUS ONCE
Status: DISCONTINUED | OUTPATIENT
Start: 2022-01-01 | End: 2022-02-18 | Stop reason: HOSPADM

## 2022-01-01 RX ORDER — OXYCODONE HYDROCHLORIDE AND ACETAMINOPHEN 5; 325 MG/1; MG/1
1 TABLET ORAL EVERY 6 HOURS PRN
Status: DISCONTINUED | OUTPATIENT
Start: 2022-01-01 | End: 2022-02-18 | Stop reason: HOSPADM

## 2022-01-01 RX ORDER — CARVEDILOL 3.12 MG/1
3.12 TABLET ORAL 2 TIMES DAILY WITH MEALS
Status: DISCONTINUED | OUTPATIENT
Start: 2022-01-01 | End: 2022-01-01

## 2022-01-01 RX ORDER — ACETAMINOPHEN 650 MG/1
650 SUPPOSITORY RECTAL EVERY 4 HOURS PRN
Status: DISCONTINUED | OUTPATIENT
Start: 2022-01-01 | End: 2022-01-01 | Stop reason: HOSPADM

## 2022-01-01 RX ADMIN — TAZOBACTAM SODIUM AND PIPERACILLIN SODIUM 3.38 G: 375; 3 INJECTION, SOLUTION INTRAVENOUS at 23:13

## 2022-01-01 RX ADMIN — EPINEPHRINE 1 MG: 0.1 INJECTION INTRACARDIAC; INTRAVENOUS at 15:41

## 2022-01-01 RX ADMIN — ARFORMOTEROL TARTRATE 15 MCG: 15 SOLUTION RESPIRATORY (INHALATION) at 07:58

## 2022-01-01 RX ADMIN — ENOXAPARIN SODIUM 120 MG: 60 INJECTION SUBCUTANEOUS at 11:23

## 2022-01-01 RX ADMIN — TAZOBACTAM SODIUM AND PIPERACILLIN SODIUM 3.38 G: 375; 3 INJECTION, SOLUTION INTRAVENOUS at 20:28

## 2022-01-01 RX ADMIN — METOPROLOL TARTRATE 5 MG: 5 INJECTION, SOLUTION INTRAVENOUS at 13:16

## 2022-01-01 RX ADMIN — DOXYCYCLINE 100 MG: 100 INJECTION, POWDER, LYOPHILIZED, FOR SOLUTION INTRAVENOUS at 12:00

## 2022-01-01 RX ADMIN — METOPROLOL TARTRATE 25 MG: 25 TABLET, FILM COATED ORAL at 09:54

## 2022-01-01 RX ADMIN — ENOXAPARIN SODIUM 120 MG: 60 INJECTION SUBCUTANEOUS at 23:13

## 2022-01-01 RX ADMIN — PETROLATUM 1 APPLICATION: 420 OINTMENT TOPICAL at 21:42

## 2022-01-01 RX ADMIN — ENOXAPARIN SODIUM 120 MG: 60 INJECTION SUBCUTANEOUS at 11:25

## 2022-01-01 RX ADMIN — TAZOBACTAM SODIUM AND PIPERACILLIN SODIUM 3.38 G: 375; 3 INJECTION, SOLUTION INTRAVENOUS at 05:15

## 2022-01-01 RX ADMIN — SODIUM BICARBONATE 100 MEQ: 84 INJECTION, SOLUTION INTRAVENOUS at 16:08

## 2022-01-01 RX ADMIN — DIATRIZOATE MEGLUMINE AND DIATRIZOATE SODIUM 30 ML: 600; 100 SOLUTION ORAL; RECTAL at 21:09

## 2022-01-01 RX ADMIN — ENOXAPARIN SODIUM 120 MG: 60 INJECTION SUBCUTANEOUS at 23:28

## 2022-01-01 RX ADMIN — NITROGLYCERIN 5 MCG/MIN: 20 INJECTION INTRAVENOUS at 21:18

## 2022-01-01 RX ADMIN — FUROSEMIDE 40 MG: 40 TABLET ORAL at 09:54

## 2022-01-01 RX ADMIN — CHLORHEXIDINE GLUCONATE 15 ML: 1.2 RINSE ORAL at 20:20

## 2022-01-01 RX ADMIN — SODIUM CHLORIDE, PRESERVATIVE FREE 10 ML: 5 INJECTION INTRAVENOUS at 09:41

## 2022-01-01 RX ADMIN — FENTANYL CITRATE 50 MCG: 50 INJECTION INTRAMUSCULAR; INTRAVENOUS at 18:04

## 2022-01-01 RX ADMIN — FAMOTIDINE 20 MG: 10 INJECTION INTRAVENOUS at 20:19

## 2022-01-01 RX ADMIN — FAMOTIDINE 20 MG: 10 INJECTION INTRAVENOUS at 09:34

## 2022-01-01 RX ADMIN — DOXYCYCLINE 100 MG: 100 INJECTION, POWDER, LYOPHILIZED, FOR SOLUTION INTRAVENOUS at 00:13

## 2022-01-01 RX ADMIN — CARVEDILOL 3.12 MG: 3.12 TABLET, FILM COATED ORAL at 12:00

## 2022-01-01 RX ADMIN — PETROLATUM 1 APPLICATION: 420 OINTMENT TOPICAL at 16:00

## 2022-01-01 RX ADMIN — SODIUM CHLORIDE, PRESERVATIVE FREE 10 ML: 5 INJECTION INTRAVENOUS at 20:19

## 2022-01-01 RX ADMIN — FUROSEMIDE 80 MG: 10 INJECTION, SOLUTION INTRAMUSCULAR; INTRAVENOUS at 09:34

## 2022-01-01 RX ADMIN — INSULIN HUMAN 2 UNITS: 100 INJECTION, SOLUTION PARENTERAL at 05:02

## 2022-01-01 RX ADMIN — ENOXAPARIN SODIUM 120 MG: 60 INJECTION SUBCUTANEOUS at 23:57

## 2022-01-01 RX ADMIN — APIXABAN 5 MG: 5 TABLET, FILM COATED ORAL at 20:06

## 2022-01-01 RX ADMIN — MORPHINE SULFATE 2 MG: 2 INJECTION, SOLUTION INTRAMUSCULAR; INTRAVENOUS at 03:43

## 2022-01-01 RX ADMIN — ENOXAPARIN SODIUM 120 MG: 60 INJECTION SUBCUTANEOUS at 00:19

## 2022-01-01 RX ADMIN — DOXYCYCLINE 100 MG: 100 INJECTION, POWDER, LYOPHILIZED, FOR SOLUTION INTRAVENOUS at 23:57

## 2022-01-01 RX ADMIN — TAZOBACTAM SODIUM AND PIPERACILLIN SODIUM 3.38 G: 375; 3 INJECTION, SOLUTION INTRAVENOUS at 04:12

## 2022-01-01 RX ADMIN — SODIUM CHLORIDE, PRESERVATIVE FREE 10 ML: 5 INJECTION INTRAVENOUS at 08:45

## 2022-01-01 RX ADMIN — METHYLPREDNISOLONE SODIUM SUCCINATE 125 MG: 125 INJECTION, POWDER, FOR SOLUTION INTRAMUSCULAR; INTRAVENOUS at 23:13

## 2022-01-01 RX ADMIN — CARVEDILOL 3.12 MG: 3.12 TABLET, FILM COATED ORAL at 09:06

## 2022-01-01 RX ADMIN — EPINEPHRINE 1 MG: 0.1 INJECTION INTRACARDIAC; INTRAVENOUS at 15:49

## 2022-01-01 RX ADMIN — ARFORMOTEROL TARTRATE 15 MCG: 15 SOLUTION RESPIRATORY (INHALATION) at 21:14

## 2022-01-01 RX ADMIN — ATORVASTATIN CALCIUM 80 MG: 80 TABLET, FILM COATED ORAL at 11:51

## 2022-01-01 RX ADMIN — PETROLATUM 1 APPLICATION: 420 OINTMENT TOPICAL at 20:00

## 2022-01-01 RX ADMIN — SODIUM CHLORIDE, PRESERVATIVE FREE 10 ML: 5 INJECTION INTRAVENOUS at 20:28

## 2022-01-01 RX ADMIN — DEXMEDETOMIDINE HYDROCHLORIDE 0.8 MCG/KG/HR: 100 INJECTION, SOLUTION, CONCENTRATE INTRAVENOUS at 23:40

## 2022-01-01 RX ADMIN — IOPAMIDOL 100 ML: 755 INJECTION, SOLUTION INTRAVENOUS at 14:19

## 2022-01-01 RX ADMIN — DEXMEDETOMIDINE HYDROCHLORIDE 0.6 MCG/KG/HR: 100 INJECTION, SOLUTION, CONCENTRATE INTRAVENOUS at 04:42

## 2022-01-01 RX ADMIN — TAZOBACTAM SODIUM AND PIPERACILLIN SODIUM 3.38 G: 375; 3 INJECTION, SOLUTION INTRAVENOUS at 18:33

## 2022-01-01 RX ADMIN — FUROSEMIDE 40 MG: 40 TABLET ORAL at 18:40

## 2022-01-01 RX ADMIN — INSULIN HUMAN 3 UNITS: 100 INJECTION, SOLUTION PARENTERAL at 05:19

## 2022-01-01 RX ADMIN — VANCOMYCIN HYDROCHLORIDE 2250 MG: 10 INJECTION, POWDER, LYOPHILIZED, FOR SOLUTION INTRAVENOUS at 23:13

## 2022-01-01 RX ADMIN — CARVEDILOL 3.12 MG: 3.12 TABLET, FILM COATED ORAL at 21:53

## 2022-01-01 RX ADMIN — ENOXAPARIN SODIUM 120 MG: 60 INJECTION SUBCUTANEOUS at 10:48

## 2022-01-01 RX ADMIN — FUROSEMIDE 40 MG: 40 TABLET ORAL at 08:27

## 2022-01-01 RX ADMIN — SODIUM CHLORIDE, PRESERVATIVE FREE 10 ML: 5 INJECTION INTRAVENOUS at 23:22

## 2022-01-01 RX ADMIN — SODIUM CHLORIDE, PRESERVATIVE FREE 10 ML: 5 INJECTION INTRAVENOUS at 09:30

## 2022-01-01 RX ADMIN — OXYCODONE AND ACETAMINOPHEN 1 TABLET: 5; 325 TABLET ORAL at 01:20

## 2022-01-01 RX ADMIN — ENOXAPARIN SODIUM 120 MG: 60 INJECTION SUBCUTANEOUS at 12:44

## 2022-01-01 RX ADMIN — FUROSEMIDE 80 MG: 10 INJECTION, SOLUTION INTRAMUSCULAR; INTRAVENOUS at 08:41

## 2022-01-01 RX ADMIN — SODIUM CHLORIDE, PRESERVATIVE FREE 10 ML: 5 INJECTION INTRAVENOUS at 21:53

## 2022-01-01 RX ADMIN — BUDESONIDE 1 MG: 0.5 INHALANT ORAL at 06:56

## 2022-01-01 RX ADMIN — AMIODARONE HYDROCHLORIDE 300 MG: 50 INJECTION, SOLUTION INTRAVENOUS at 15:46

## 2022-01-01 RX ADMIN — INSULIN HUMAN 2 UNITS: 100 INJECTION, SOLUTION PARENTERAL at 11:49

## 2022-01-01 RX ADMIN — INFLUENZA VIRUS VACCINE 0.5 ML: 15; 15; 15; 15 SUSPENSION INTRAMUSCULAR at 17:35

## 2022-01-01 RX ADMIN — ALBUTEROL SULFATE 6 PUFF: 90 AEROSOL, METERED RESPIRATORY (INHALATION) at 10:50

## 2022-01-01 RX ADMIN — FUROSEMIDE 80 MG: 10 INJECTION, SOLUTION INTRAMUSCULAR; INTRAVENOUS at 09:29

## 2022-01-01 RX ADMIN — INSULIN HUMAN 2 UNITS: 100 INJECTION, SOLUTION PARENTERAL at 12:44

## 2022-01-01 RX ADMIN — ENOXAPARIN SODIUM 120 MG: 60 INJECTION SUBCUTANEOUS at 11:41

## 2022-01-01 RX ADMIN — SODIUM CHLORIDE, PRESERVATIVE FREE 10 ML: 5 INJECTION INTRAVENOUS at 21:18

## 2022-01-01 RX ADMIN — SODIUM CHLORIDE, PRESERVATIVE FREE 10 ML: 5 INJECTION INTRAVENOUS at 23:50

## 2022-01-01 RX ADMIN — VANCOMYCIN HYDROCHLORIDE 1500 MG: 10 INJECTION, POWDER, LYOPHILIZED, FOR SOLUTION INTRAVENOUS at 22:01

## 2022-01-01 RX ADMIN — LISINOPRIL 20 MG: 20 TABLET ORAL at 09:54

## 2022-01-01 RX ADMIN — CARVEDILOL 3.12 MG: 3.12 TABLET, FILM COATED ORAL at 17:35

## 2022-01-01 RX ADMIN — DOPAMINE HYDROCHLORIDE 2 MCG/KG/MIN: 160 INJECTION, SOLUTION INTRAVENOUS at 11:43

## 2022-01-01 RX ADMIN — FUROSEMIDE 40 MG: 10 INJECTION INTRAMUSCULAR; INTRAVENOUS at 11:34

## 2022-01-01 RX ADMIN — DEXMEDETOMIDINE HYDROCHLORIDE 0.6 MCG/KG/HR: 100 INJECTION, SOLUTION, CONCENTRATE INTRAVENOUS at 23:34

## 2022-01-01 RX ADMIN — SODIUM CHLORIDE, PRESERVATIVE FREE 10 ML: 5 INJECTION INTRAVENOUS at 08:23

## 2022-01-01 RX ADMIN — DEXAMETHASONE SODIUM PHOSPHATE 6 MG: 10 INJECTION INTRAMUSCULAR; INTRAVENOUS at 08:46

## 2022-01-01 RX ADMIN — ENOXAPARIN SODIUM 120 MG: 60 INJECTION SUBCUTANEOUS at 01:57

## 2022-01-01 RX ADMIN — LORAZEPAM 2 MG: 2 INJECTION INTRAMUSCULAR; INTRAVENOUS at 19:44

## 2022-01-01 RX ADMIN — FENTANYL CITRATE 25 MCG: 50 INJECTION INTRAMUSCULAR; INTRAVENOUS at 00:34

## 2022-01-01 RX ADMIN — PETROLATUM 1 APPLICATION: 420 OINTMENT TOPICAL at 08:42

## 2022-01-01 RX ADMIN — SODIUM CHLORIDE, PRESERVATIVE FREE 10 ML: 5 INJECTION INTRAVENOUS at 02:13

## 2022-01-01 RX ADMIN — IPRATROPIUM BROMIDE AND ALBUTEROL SULFATE 3 ML: .5; 3 SOLUTION RESPIRATORY (INHALATION) at 08:20

## 2022-01-01 RX ADMIN — ATORVASTATIN CALCIUM 80 MG: 80 TABLET, FILM COATED ORAL at 08:27

## 2022-01-01 RX ADMIN — PROPOFOL 5 MCG/KG/MIN: 10 INJECTION, EMULSION INTRAVENOUS at 07:45

## 2022-01-01 RX ADMIN — DOXYCYCLINE 100 MG: 100 INJECTION, POWDER, LYOPHILIZED, FOR SOLUTION INTRAVENOUS at 11:41

## 2022-01-01 RX ADMIN — SODIUM CHLORIDE, PRESERVATIVE FREE 10 ML: 5 INJECTION INTRAVENOUS at 11:01

## 2022-01-01 RX ADMIN — ATORVASTATIN CALCIUM 80 MG: 80 TABLET, FILM COATED ORAL at 08:23

## 2022-01-01 RX ADMIN — CARVEDILOL 3.12 MG: 3.12 TABLET, FILM COATED ORAL at 08:27

## 2022-01-01 RX ADMIN — ARFORMOTEROL TARTRATE 15 MCG: 15 SOLUTION RESPIRATORY (INHALATION) at 08:08

## 2022-01-01 RX ADMIN — POTASSIUM CHLORIDE 40 MEQ: 750 TABLET, EXTENDED RELEASE ORAL at 14:02

## 2022-01-01 RX ADMIN — METOPROLOL TARTRATE 25 MG: 25 TABLET, FILM COATED ORAL at 21:33

## 2022-01-01 RX ADMIN — DOXYCYCLINE 100 MG: 100 INJECTION, POWDER, LYOPHILIZED, FOR SOLUTION INTRAVENOUS at 00:44

## 2022-01-01 RX ADMIN — BUDESONIDE 1 MG: 0.5 INHALANT ORAL at 19:43

## 2022-01-01 RX ADMIN — DOXYCYCLINE 100 MG: 100 INJECTION, POWDER, LYOPHILIZED, FOR SOLUTION INTRAVENOUS at 10:48

## 2022-01-01 RX ADMIN — PETROLATUM 1 APPLICATION: 420 OINTMENT TOPICAL at 09:31

## 2022-01-01 RX ADMIN — SODIUM CHLORIDE, PRESERVATIVE FREE 10 ML: 5 INJECTION INTRAVENOUS at 20:06

## 2022-01-01 RX ADMIN — DEXMEDETOMIDINE HYDROCHLORIDE 1 MCG/KG/HR: 100 INJECTION, SOLUTION, CONCENTRATE INTRAVENOUS at 02:59

## 2022-01-01 RX ADMIN — DOPAMINE HYDROCHLORIDE 5 MCG/KG/MIN: 160 INJECTION, SOLUTION INTRAVENOUS at 17:08

## 2022-01-01 RX ADMIN — EPINEPHRINE 1 MG: 0.1 INJECTION INTRACARDIAC; INTRAVENOUS at 15:52

## 2022-01-01 RX ADMIN — HYDRALAZINE HYDROCHLORIDE 10 MG: 20 INJECTION INTRAMUSCULAR; INTRAVENOUS at 14:04

## 2022-01-01 RX ADMIN — INSULIN HUMAN 2 UNITS: 100 INJECTION, SOLUTION PARENTERAL at 23:22

## 2022-01-01 RX ADMIN — ENOXAPARIN SODIUM 120 MG: 60 INJECTION SUBCUTANEOUS at 00:14

## 2022-01-01 RX ADMIN — INSULIN HUMAN 3 UNITS: 100 INJECTION, SOLUTION PARENTERAL at 01:36

## 2022-01-01 RX ADMIN — PROPOFOL 20 MCG/KG/MIN: 10 INJECTION, EMULSION INTRAVENOUS at 14:00

## 2022-01-01 RX ADMIN — CHLORHEXIDINE GLUCONATE 15 ML: 1.2 RINSE ORAL at 09:38

## 2022-01-01 RX ADMIN — EPINEPHRINE 1 MG: 0.1 INJECTION, SOLUTION ENDOTRACHEAL; INTRACARDIAC; INTRAVENOUS at 16:08

## 2022-01-01 RX ADMIN — FLUCONAZOLE 400 MG: 400 INJECTION, SOLUTION INTRAVENOUS at 18:30

## 2022-01-01 RX ADMIN — FUROSEMIDE 80 MG: 10 INJECTION, SOLUTION INTRAMUSCULAR; INTRAVENOUS at 18:47

## 2022-01-01 RX ADMIN — ALBUTEROL SULFATE 6 PUFF: 90 AEROSOL, METERED RESPIRATORY (INHALATION) at 15:23

## 2022-01-01 RX ADMIN — ENOXAPARIN SODIUM 120 MG: 60 INJECTION SUBCUTANEOUS at 12:32

## 2022-01-01 RX ADMIN — FUROSEMIDE 40 MG: 40 TABLET ORAL at 23:29

## 2022-01-01 RX ADMIN — PETROLATUM 1 APPLICATION: 420 OINTMENT TOPICAL at 21:38

## 2022-01-01 RX ADMIN — REMDESIVIR 100 MG: 100 INJECTION, POWDER, LYOPHILIZED, FOR SOLUTION INTRAVENOUS at 02:10

## 2022-01-01 RX ADMIN — METOPROLOL TARTRATE 25 MG: 25 TABLET, FILM COATED ORAL at 21:38

## 2022-01-01 RX ADMIN — FUROSEMIDE 80 MG: 20 INJECTION, SOLUTION INTRAMUSCULAR; INTRAVENOUS at 21:16

## 2022-01-01 RX ADMIN — ATORVASTATIN CALCIUM 80 MG: 80 TABLET, FILM COATED ORAL at 09:06

## 2022-01-01 RX ADMIN — ATROPINE SULFATE 1 MG: 1 INJECTION, SOLUTION INTRAMUSCULAR; INTRAVENOUS; SUBCUTANEOUS at 15:36

## 2022-01-01 RX ADMIN — BUDESONIDE 1 MG: 0.5 INHALANT ORAL at 07:59

## 2022-01-01 RX ADMIN — SODIUM CHLORIDE 999 ML/HR: 9 INJECTION, SOLUTION INTRAVENOUS at 15:40

## 2022-01-01 RX ADMIN — FUROSEMIDE 40 MG: 10 INJECTION INTRAMUSCULAR; INTRAVENOUS at 12:21

## 2022-01-01 RX ADMIN — INSULIN HUMAN 5 UNITS: 100 INJECTION, SOLUTION PARENTERAL at 12:21

## 2022-01-01 RX ADMIN — EPINEPHRINE 0.3 MCG/KG/MIN: 1 INJECTION PARENTERAL at 07:40

## 2022-01-01 RX ADMIN — SODIUM CHLORIDE 50 ML/HR: 9 INJECTION, SOLUTION INTRAVENOUS at 11:01

## 2022-01-01 RX ADMIN — REMDESIVIR 200 MG: 100 INJECTION, POWDER, LYOPHILIZED, FOR SOLUTION INTRAVENOUS at 02:38

## 2022-01-01 RX ADMIN — DEXAMETHASONE SODIUM PHOSPHATE 6 MG: 10 INJECTION INTRAMUSCULAR; INTRAVENOUS at 11:01

## 2022-01-01 RX ADMIN — INSULIN HUMAN 2 UNITS: 100 INJECTION, SOLUTION PARENTERAL at 09:06

## 2022-01-01 RX ADMIN — METOPROLOL TARTRATE 25 MG: 25 TABLET, FILM COATED ORAL at 13:56

## 2022-01-01 RX ADMIN — DOXYCYCLINE 100 MG: 100 INJECTION, POWDER, LYOPHILIZED, FOR SOLUTION INTRAVENOUS at 23:23

## 2022-01-01 RX ADMIN — ATORVASTATIN CALCIUM 80 MG: 80 TABLET, FILM COATED ORAL at 08:15

## 2022-01-01 RX ADMIN — INSULIN HUMAN 5 UNITS: 100 INJECTION, SOLUTION PARENTERAL at 18:37

## 2022-01-01 RX ADMIN — SODIUM CHLORIDE, PRESERVATIVE FREE 10 ML: 5 INJECTION INTRAVENOUS at 21:32

## 2022-01-01 RX ADMIN — CARVEDILOL 3.12 MG: 3.12 TABLET, FILM COATED ORAL at 11:00

## 2022-01-01 RX ADMIN — FAMOTIDINE 20 MG: 10 INJECTION INTRAVENOUS at 09:29

## 2022-01-01 RX ADMIN — IOPAMIDOL 85 ML: 612 INJECTION, SOLUTION INTRAVENOUS at 22:19

## 2022-01-01 RX ADMIN — SODIUM CHLORIDE, PRESERVATIVE FREE 10 ML: 5 INJECTION INTRAVENOUS at 08:42

## 2022-01-01 RX ADMIN — TAZOBACTAM SODIUM AND PIPERACILLIN SODIUM 3.38 G: 375; 3 INJECTION, SOLUTION INTRAVENOUS at 13:06

## 2022-01-01 RX ADMIN — BUDESONIDE 1 MG: 0.5 INHALANT ORAL at 21:08

## 2022-01-01 RX ADMIN — ACETAMINOPHEN 650 MG: 325 TABLET, FILM COATED ORAL at 22:33

## 2022-01-01 RX ADMIN — ENOXAPARIN SODIUM 120 MG: 60 INJECTION SUBCUTANEOUS at 23:23

## 2022-01-01 RX ADMIN — DOXYCYCLINE 100 MG: 100 INJECTION, POWDER, LYOPHILIZED, FOR SOLUTION INTRAVENOUS at 11:26

## 2022-01-01 RX ADMIN — METHYLPREDNISOLONE SODIUM SUCCINATE 125 MG: 125 INJECTION, POWDER, FOR SOLUTION INTRAMUSCULAR; INTRAVENOUS at 21:17

## 2022-01-01 RX ADMIN — ENOXAPARIN SODIUM 120 MG: 60 INJECTION SUBCUTANEOUS at 14:02

## 2022-01-01 RX ADMIN — EPINEPHRINE 1 MG: 0.1 INJECTION INTRACARDIAC; INTRAVENOUS at 15:35

## 2022-01-01 RX ADMIN — SODIUM CHLORIDE, PRESERVATIVE FREE 10 ML: 5 INJECTION INTRAVENOUS at 09:08

## 2022-01-01 RX ADMIN — ARFORMOTEROL TARTRATE 15 MCG: 15 SOLUTION RESPIRATORY (INHALATION) at 06:27

## 2022-01-01 RX ADMIN — INSULIN HUMAN 3 UNITS: 100 INJECTION, SOLUTION PARENTERAL at 13:06

## 2022-01-01 RX ADMIN — SODIUM CHLORIDE, PRESERVATIVE FREE 10 ML: 5 INJECTION INTRAVENOUS at 08:48

## 2022-01-01 RX ADMIN — SODIUM CHLORIDE, PRESERVATIVE FREE 10 ML: 5 INJECTION INTRAVENOUS at 21:33

## 2022-01-01 RX ADMIN — POTASSIUM CHLORIDE 40 MEQ: 750 TABLET, EXTENDED RELEASE ORAL at 06:35

## 2022-01-01 RX ADMIN — ENOXAPARIN SODIUM 120 MG: 60 INJECTION SUBCUTANEOUS at 11:45

## 2022-01-01 RX ADMIN — BUDESONIDE 1 MG: 0.5 INHALANT ORAL at 21:17

## 2022-01-01 RX ADMIN — DEXAMETHASONE SODIUM PHOSPHATE 6 MG: 10 INJECTION INTRAMUSCULAR; INTRAVENOUS at 08:44

## 2022-01-01 RX ADMIN — ATORVASTATIN CALCIUM 80 MG: 80 TABLET, FILM COATED ORAL at 09:54

## 2022-01-01 RX ADMIN — MORPHINE SULFATE 5 MG: 2 INJECTION, SOLUTION INTRAMUSCULAR; INTRAVENOUS at 19:40

## 2022-01-01 RX ADMIN — PROPOFOL 5 MG: 10 INJECTION, EMULSION INTRAVENOUS at 07:38

## 2022-01-01 RX ADMIN — ARFORMOTEROL TARTRATE 15 MCG: 15 SOLUTION RESPIRATORY (INHALATION) at 06:56

## 2022-01-01 RX ADMIN — BUMETANIDE 1 MG: 0.25 INJECTION INTRAMUSCULAR; INTRAVENOUS at 00:37

## 2022-01-01 RX ADMIN — INSULIN HUMAN 2 UNITS: 100 INJECTION, SOLUTION PARENTERAL at 17:31

## 2022-01-01 RX ADMIN — FENTANYL CITRATE 25 MCG: 50 INJECTION INTRAMUSCULAR; INTRAVENOUS at 02:58

## 2022-01-01 RX ADMIN — Medication 0.02 MCG/KG/MIN: at 17:46

## 2022-01-01 RX ADMIN — BUDESONIDE 1 MG: 0.5 INHALANT ORAL at 08:09

## 2022-01-01 RX ADMIN — ARFORMOTEROL TARTRATE 15 MCG: 15 SOLUTION RESPIRATORY (INHALATION) at 19:43

## 2022-01-01 RX ADMIN — EPINEPHRINE 1 MG: 0.1 INJECTION, SOLUTION ENDOTRACHEAL; INTRACARDIAC; INTRAVENOUS at 16:05

## 2022-01-01 RX ADMIN — BUDESONIDE 1 MG: 0.5 INHALANT ORAL at 06:28

## 2022-01-01 RX ADMIN — ATROPINE SULFATE 0.4 MG: 0.4 INJECTION, SOLUTION INTRAMUSCULAR; INTRAVENOUS; SUBCUTANEOUS at 07:34

## 2022-01-01 RX ADMIN — FUROSEMIDE 40 MG: 40 TABLET ORAL at 17:27

## 2022-01-01 RX ADMIN — FUROSEMIDE 40 MG: 40 TABLET ORAL at 11:23

## 2022-01-01 RX ADMIN — PERFLUTREN 3 ML: 6.52 INJECTION, SUSPENSION INTRAVENOUS at 10:35

## 2022-01-01 RX ADMIN — FUROSEMIDE 80 MG: 20 INJECTION, SOLUTION INTRAMUSCULAR; INTRAVENOUS at 23:13

## 2022-01-01 RX ADMIN — FUROSEMIDE 80 MG: 10 INJECTION, SOLUTION INTRAMUSCULAR; INTRAVENOUS at 17:33

## 2022-01-01 RX ADMIN — DOXYCYCLINE 100 MG: 100 INJECTION, POWDER, LYOPHILIZED, FOR SOLUTION INTRAVENOUS at 12:44

## 2022-01-01 RX ADMIN — BUDESONIDE 1 MG: 0.5 INHALANT ORAL at 08:20

## 2022-01-01 RX ADMIN — LIDOCAINE HYDROCHLORIDE 20 ML: 10 INJECTION, SOLUTION INFILTRATION; PERINEURAL at 14:56

## 2022-01-01 RX ADMIN — REMDESIVIR 100 MG: 100 INJECTION, POWDER, LYOPHILIZED, FOR SOLUTION INTRAVENOUS at 23:25

## 2022-01-01 RX ADMIN — SODIUM CHLORIDE, PRESERVATIVE FREE 10 ML: 5 INJECTION INTRAVENOUS at 08:27

## 2022-01-01 RX ADMIN — INSULIN HUMAN 8 UNITS: 100 INJECTION, SOLUTION PARENTERAL at 12:23

## 2022-01-01 RX ADMIN — METOPROLOL TARTRATE 25 MG: 25 TABLET, FILM COATED ORAL at 08:15

## 2022-01-01 RX ADMIN — FUROSEMIDE 40 MG: 40 TABLET ORAL at 08:15

## 2022-01-01 RX ADMIN — LISINOPRIL 20 MG: 20 TABLET ORAL at 08:15

## 2022-01-01 RX ADMIN — INSULIN HUMAN 4 UNITS: 100 INJECTION, SOLUTION PARENTERAL at 21:33

## 2022-01-01 RX ADMIN — DEXMEDETOMIDINE HYDROCHLORIDE 0.2 MCG/KG/HR: 100 INJECTION, SOLUTION, CONCENTRATE INTRAVENOUS at 19:02

## 2022-01-01 RX ADMIN — PETROLATUM 1 APPLICATION: 420 OINTMENT TOPICAL at 23:22

## 2022-01-01 RX ADMIN — INSULIN HUMAN 5 UNITS: 100 INJECTION, SOLUTION PARENTERAL at 18:07

## 2022-01-11 NOTE — TELEPHONE ENCOUNTER
Hub staff attempted to follow warm transfer process and was unsuccessful     Caller: Jesus Izaguirre Jr.    Relationship to patient: Self    Best call back number: [phone]    Patient is needing: PATIENT STATES HE NEEDS A LAB APPOINTMENT SET TO CHECK HIS BLOOD SUGAR LEVELS BEFORE HIS APPOINTMENT NEXT WEEK WITH ELLI ROCHA    PLEASE CALL AND ADVISE

## 2022-01-16 PROBLEM — J96.01 ACUTE RESPIRATORY FAILURE WITH HYPOXIA AND HYPERCAPNIA (HCC): Status: ACTIVE | Noted: 2022-01-01

## 2022-01-16 PROBLEM — J96.02 ACUTE RESPIRATORY FAILURE WITH HYPOXIA AND HYPERCAPNIA (HCC): Status: ACTIVE | Noted: 2022-01-01

## 2022-01-20 PROBLEM — I73.9 PVD (PERIPHERAL VASCULAR DISEASE) (HCC): Status: ACTIVE | Noted: 2022-01-01

## 2022-01-20 PROBLEM — I42.8 NICM (NONISCHEMIC CARDIOMYOPATHY) (HCC): Status: ACTIVE | Noted: 2022-01-01

## 2022-01-20 PROBLEM — G93.41 METABOLIC ENCEPHALOPATHY: Status: ACTIVE | Noted: 2022-01-01

## 2022-01-20 PROBLEM — Z87.891 FORMER SMOKER: Status: ACTIVE | Noted: 2022-01-01

## 2022-01-20 PROBLEM — I50.43 ACUTE ON CHRONIC COMBINED SYSTOLIC AND DIASTOLIC CHF (CONGESTIVE HEART FAILURE) (HCC): Status: ACTIVE | Noted: 2022-01-01

## 2022-01-20 PROBLEM — Z86.73 HISTORY OF CARDIOEMBOLIC CEREBROVASCULAR ACCIDENT (CVA): Status: ACTIVE | Noted: 2022-01-01

## 2022-01-20 PROBLEM — N17.9 AKI (ACUTE KIDNEY INJURY) (HCC): Status: ACTIVE | Noted: 2022-01-01

## 2022-02-02 NOTE — HOME HEALTH
Patient admitted to home care service for wound care to RLE venous stasis ulcer. Noted history of Type II DM, vitamin deficiency, hyperlipidemia, HTN, chronic ischemic right MCA stroke and respiratory failure. Patient currently residing with son and daughter-in-law for assistance with adl's and wound care. Patient reports he is planning to sell his home and move into a home that is more manageable for him alone. Uses cane during ambulation, gait slow and steady. Reports constant pain and discomfort to knees with little to know relief. Denies pain to wound. SN to teach patient and caregiver to perform wound care as ordered.

## 2022-02-03 NOTE — HOME HEALTH
Patient said he is feeling much better and not as short as short of breath  Plan for next visit: activity tolerance, bathroom safety, personal care, HEP

## 2022-02-07 NOTE — CASE COMMUNICATION
Reason for referral: 75 yo M referred to home health PT after recent hospitalization related to hypertensive heart disease.    Subjective: I am getting around pretty well these days. per patient      Past Medical History: PVD, CVA, IKE, CHF, metabolic encephalopathy, obesity, neuropathy    Previous level of function: living alone IND, occasionally used cane, IND with bathing/dressing    Home environment/support: currently staying with abran clark. Has claude, BSC. 3-4 steps to enter    Assessment: anticipate 2w3 for evaluation, gait training, ther ex, HEP, fall prevention, home safety instruction.

## 2022-02-07 NOTE — HOME HEALTH
Reason for referral: 77 yo M referred to home health PT after recent hospitalization related to hypertensive heart disease.    Subjective: I am getting around pretty well these days. per patient      Past Medical History: PVD, CVA, IKE, CHF, metabolic encephalopathy, obesity, neuropathy    Previous level of function: living alone IND, occasionally used cane, IND with bathing/dressing    Home environment/support: currently staying with family. Has cane, BSC. 3-4 steps to enter    Assessment: anticipate 2w3 for evaluation, gait training, ther ex, HEP, fall prevention, home safety instruction.

## 2022-02-08 NOTE — HOME HEALTH
Patient said he did not yet get a shower, but his son was away over the weekend, so he wants to wait for him to get home before he tries to get in there, and did not want assist from OT today  Plan for next visit: HEP, activity tolerance, bathroom mobility

## 2022-02-09 NOTE — HOME HEALTH
Arrived at patient home, welcomed in by patient. States he has not made it to his home to  meds and items he needs. His son may attempt to take him tomorrow. Reports daughter-in-law has been doing dressing changes as ordered.

## 2022-02-09 NOTE — HOME HEALTH
Subjective: I have been using the cane more. per patient    no new falls  no new meds    Assessment: patient able to progress to standing ther ex today and appears to be using cane on more consistent basis.    Plan for next visit: gait training with cane, standing HEP, fall prevention training

## 2022-02-11 NOTE — HOME HEALTH
Patient park he is in the process of selling of his house and looking for a new place up here.  Plan for next visit: HEP, DME, home safety, activity tolerance

## 2022-02-15 NOTE — CASE COMMUNICATION
Dear DULCE Hairston,    Re: Patient Jesus Izaguirre .  : 1945    The occupational therapy visit on 02/15/2022 for the above patient was missed due to his being in the hospital at the scheduled time for occupational therapy.  Therefore, the prescribed frequency of visits was not met.    If you have questions or would like further information about this patient, please contact us at [phone].    Regards,   Carole villar, MS, OTR/L

## 2022-02-15 NOTE — NURSING NOTE
Pt admitted to CCU. On Bipap (FiO2 50%). Pt was initially in Afib (rate 60s) on arrival to unit; currently sinus jamal with frequent PVCs/PACs (HR 40s-50s at this time); blood pressure remains stable with systolic in 100s-120s. EKG obtained and labs collected. Pulmonology MD aware; cardiology consult placed. Pt is confused, drowsy, and arouses to voice. Pt follows commands. UOP= 600ml and two unmeasured urine occurrences. Zosyn and Remdesivir given. Plan of care discussed with patient and patient's son Paco. Continue to monitor.

## 2022-02-15 NOTE — DISCHARGE PLACEMENT REQUEST
"Jesus Mcnamara Jr. (76 y.o. Male)             Date of Birth Social Security Number Address Home Phone MRN    1945  [address] [place] [address] [phone] [id number]    Presybeterian Marital Status             None        Admission Date Admission Type Admitting Provider Attending Provider Department, Room/Bed    2/14/22 Emergency Olimpia Peña MD Sayied, Tausif, MD [facility] CORONARY [facility], N326/1    Discharge Date Discharge Disposition Discharge Destination                         Attending Provider: Olimpia Peña MD    Allergies: No Known Allergies    Isolation: Enh Drop/Con   Infection: COVID (confirmed) (02/14/22)   Code Status: CPR   Advance Care Planning Activity    Ht: 182.9 cm (72\")   Wt: 115 kg (252 lb 13.9 oz)    Admission Cmt: None   Principal Problem: None                Active Insurance as of 2/14/2022     Primary Coverage     Payor Plan Insurance Group Employer/Plan Group    AETNA MEDICARE REPLACEMENT AETNA MEDICARE REPLACEMENT [id number]     Payor Plan Address Payor Plan Phone Number Payor Plan Fax Number Effective Dates    [address] [phone]  4/1/2021 - None Entered    [facility] [id number]       Subscriber Name Subscriber Birth Date Member ID       JESUS MCNAMARA JR. 1945 [id number]                 Emergency Contacts      (Rel.) Home Phone Work Phone Mobile Phone    mylene mcnamara (Son) [phone] -- [phone]    Lisa Mcnamara (Daughter) [phone] -- [phone]    DmitriyJose (Son) -- -- --              "

## 2022-02-15 NOTE — PROGRESS NOTES
"[facility] Clinical [facility]acy Services: Vancomycin and Zosyn Pharmacokinetic Initial Consult Note    Jesus Izaguirre Jr. is a 76 y.o. male who is on day 1 of pharmacy to dose vancomycin and Zosyn.    Indication: SSTI  Consulting Provider: Dr. Peña  Planned Duration of Therapy: TBD - consulted for 7 days  Loading Dose Given: 1250 mg on 2/14 at 2132  MRSA PCR performed: Not indicated per protocol  Culture/Source: 2/14 - Blood cx pending  Target: Dose by Levels  Other Antimicrobials:     Vitals/Labs  Ht: 182.9 cm (72\"); Wt: 116 kg (255 lb 8.2 oz)  Temp Readings from Last 1 Encounters:   02/15/22 96.5 °F (35.8 °C) (Axillary)    Estimated Creatinine Clearance: 61.2 mL/min (A) (by C-G formula based on SCr of 1.35 mg/dL (H)).        Results from last 7 days   Lab Units 02/15/22  0048 02/14/22  2047   CREATININE mg/dL 1.35* 1.27   WBC 10*3/mm3  --  14.08*     Assessment/Plan:    Scr trending up 1.27--> 1.35, previously 0.73mg/dl on 1/24/22. Will intermittently dose vancomycin until renal function stabilizes    Vancomycin Dose:   Intermittent Dosing    Vanc Random has been ordered for 2/15 at 1300    Zosyn 3.375gm IV q8h, CrCl estimate >20ml/min at this time     Pharmacy will follow patient's kidney function and will adjust doses and obtain levels as necessary. Thank you for involving pharmacy in this patient's care. Please contact pharmacy with any questions or concerns.                           Jeronimo Cates, [facility]  Clinical Pharmacist   "

## 2022-02-15 NOTE — ED PROVIDER NOTES
EMERGENCY DEPARTMENT ENCOUNTER    Room Number:  38/38  Date of encounter:  2/15/2022  PCP: Jason Joya APRN  Historian: Son      HPI:  Chief Complaint: Increased confusion    A complete HPI/ROS/PMH/PSH/SH/FH are unobtainable due to: Confusion    Context: Jesus Izaguirre Jr. is a 76 y.o. male who presents to the ED from home via car per patient sign he states the patient is more lethargic and difficult to arouse today.  Patient's oxygen saturations in triage were 64% and he was brought directly back to a room.  The patient is lethargic but arousable but however is unable to give any further history.  Of note is that the care of this patient approximately a month ago when he presented in acute hypoxic and hypercapnic respiratory failure requiring BiPAP and ICU admission.        PAST MEDICAL HISTORY  Active Ambulatory Problems     Diagnosis Date Noted   • Chronic ischemic right MCA stroke 04/29/2016   • BPH (benign prostatic hyperplasia) 07/05/2016   • Gout 07/05/2016   • Hyperlipidemia 07/05/2016   • Hypertension 07/05/2016   • YEISON (obstructive sleep apnea) 07/05/2016   • Type 2 diabetes mellitus with diabetic polyneuropathy, without long-term current use of insulin ([facility]) 07/05/2016   • Vitamin B12 deficiency 10/31/2016   • Vitamin D deficiency 10/31/2016   • History of lithotripsy 03/22/2012   • Onychia of finger 12/09/2012   • Venous stasis 03/17/2017   • Memory impairment 05/01/2017   • Neuropathy 09/21/2017   • Paroxysmal atrial fibrillation (HCC) 03/24/2018   • Tobacco abuse 03/24/2018   • Obesity (BMI 30-39.9) 04/07/2019   • Colon cancer screening 04/12/2021   • Cellulitis of abdominal wall 11/04/2021   • Acute respiratory failure with hypoxia and hypercapnia ([facility]) 01/16/2022   • Metabolic encephalopathy 01/20/2022   • Acute on chronic combined systolic and diastolic CHF (congestive heart failure) ([facility]) 01/20/2022   • Former smoker 01/20/2022   • IKE (acute kidney injury) ([facility]) 01/20/2022   • NICM  (nonischemic cardiomyopathy) ([facility]) 2022   • History of cardioembolic cerebrovascular accident (CVA) 2022   • PVD (peripheral vascular disease) ([facility]) 2022     Resolved Ambulatory Problems     Diagnosis Date Noted   • Acute bronchitis 2016   • AB (asthmatic bronchitis) 2016   • Bradycardia 2016   • Cough 2016   • Dribbling from mouth 2016   • Fatigue 2016   • Febrile 2016   • Asian flu 2016   • Amnesia 2016   • Breath shortness 2016   • Calculus of kidney 2017   • Paronychia of finger 2012   • Acute pain of right knee 2017     Past Medical History:   Diagnosis Date   • Asthma    • BPH (benign prostatic hypertrophy)    • Class 2 obesity in adult 2019   • Diabetes mellitus ([facility])    • HLD (hyperlipidemia) 2016   • Nephrolithiasis    • Stroke ([facility]) 2015   • Venous insufficiency          PAST SURGICAL HISTORY  Past Surgical History:   Procedure Laterality Date   • LITHOTRIPSY      Renal         FAMILY HISTORY  Family History   Problem Relation Age of Onset   • Arthritis Mother    • Cancer Mother    • Migraines Mother    • Hypertension Other          SOCIAL HISTORY  Social History     Socioeconomic History   • Marital status:    Tobacco Use   • Smoking status: Former Smoker     Packs/day: 1.00     Quit date:      Years since quittin.1   • Smokeless tobacco: Former User   • Tobacco comment: Cessation 2014; HX of 1 ppd for 52 yrs.   Vaping Use   • Vaping Use: Never used   Substance and Sexual Activity   • Alcohol use: No     Comment: caffeine use    • Drug use: No   • Sexual activity: Defer         ALLERGIES  Patient has no known allergies.        REVIEW OF SYSTEMS  Review of Systems     Unable due to altered mental status  All systems reviewed and negative except for those discussed in HPI.     PHYSICAL EXAM    I have reviewed the triage vital signs and nursing notes.    ED Triage Vitals   Temp Pulse  Resp BP SpO2   -- -- -- -- --      Temp src Heart Rate Source Patient Position BP Location FiO2 (%)   -- -- -- -- --       GENERAL: 76-year-old well developed, well nourished in moderate respiratory distress  HENT: NCAT, neck supple, trachea midline  EYES: no scleral icterus, PERRL, normal conjunctivae  CV: regular rhythm, regular rate, no murmur  RESPIRATORY: Decreased respiratory effort with wheezes in bases bilaterally  ABDOMEN: soft, nontender, nondistended, bowel sounds present  MUSCULOSKELETAL: no gross deformity, 3+ pedal edema, no calf tenderness: Chronic venous changes  NEURO: Lethargic but arousable and moves all 4 extremities equally to command  SKIN: warm, dry, no rash        PPE  Pt does not present with symptoms for COVID19; however, I was wearing a N95 mask and goggles throughout all patient interaction.    Vital signs and nursing notes reviewed.      LAB RESULTS  Recent Results (from the past 24 hour(s))   POC Glucose Once    Collection Time: 02/14/22  8:27 PM    Specimen: Blood   Result Value Ref Range    Glucose 234 (H) 70 - 130 mg/dL   ECG 12 Lead    Collection Time: 02/14/22  8:32 PM   Result Value Ref Range    QT Interval 484 ms   Comprehensive Metabolic Panel    Collection Time: 02/14/22  8:47 PM    Specimen: Blood   Result Value Ref Range    Glucose 270 (H) 65 - 99 mg/dL    BUN 27 (H) 8 - 23 mg/dL    Creatinine 1.27 0.76 - 1.27 mg/dL    Sodium 137 136 - 145 mmol/L    Potassium 4.4 3.5 - 5.2 mmol/L    Chloride 101 98 - 107 mmol/L    CO2 27.3 22.0 - 29.0 mmol/L    Calcium 9.1 8.6 - 10.5 mg/dL    Total Protein 7.5 6.0 - 8.5 g/dL    Albumin 4.10 3.50 - 5.20 g/dL    ALT (SGPT) 53 (H) 1 - 41 U/L    AST (SGOT) 44 (H) 1 - 40 U/L    Alkaline Phosphatase 152 (H) 39 - 117 U/L    Total Bilirubin 0.3 0.0 - 1.2 mg/dL    eGFR Non African Amer 55 (L) >60 mL/min/1.73    Globulin 3.4 gm/dL    A/G Ratio 1.2 g/dL    BUN/Creatinine Ratio 21.3 7.0 - 25.0    Anion Gap 8.7 5.0 - 15.0 mmol/L   BNP    Collection Time:  02/14/22  8:47 PM    Specimen: Blood   Result Value Ref Range    proBNP 11,950.0 (H) 0.0-1,800.0 pg/mL   Troponin    Collection Time: 02/14/22  8:47 PM    Specimen: Blood   Result Value Ref Range    Troponin T <0.010 0.000 - 0.030 ng/mL   Green Top (Gel)    Collection Time: 02/14/22  8:47 PM   Result Value Ref Range    Extra Tube Hold for add-ons.    Lavender Top    Collection Time: 02/14/22  8:47 PM   Result Value Ref Range    Extra Tube hold for add-on    Gold Top - SST    Collection Time: 02/14/22  8:47 PM   Result Value Ref Range    Extra Tube Hold for add-ons.    Light Blue Top    Collection Time: 02/14/22  8:47 PM   Result Value Ref Range    Extra Tube hold for add-on    CBC Auto Differential    Collection Time: 02/14/22  8:47 PM    Specimen: Blood   Result Value Ref Range    WBC 14.08 (H) 3.40 - 10.80 10*3/mm3    RBC 5.75 4.14 - 5.80 10*6/mm3    Hemoglobin 16.3 13.0 - 17.7 g/dL    Hematocrit 52.3 (H) 37.5 - 51.0 %    MCV 91.0 79.0 - 97.0 fL    MCH 28.3 26.6 - 33.0 pg    MCHC 31.2 (L) 31.5 - 35.7 g/dL    RDW 13.8 12.3 - 15.4 %    RDW-SD 45.8 37.0 - 54.0 fl    MPV 11.3 6.0 - 12.0 fL    Platelets 282 140 - 450 10*3/mm3    Neutrophil % 81.3 (H) 42.7 - 76.0 %    Lymphocyte % 10.1 (L) 19.6 - 45.3 %    Monocyte % 7.2 5.0 - 12.0 %    Eosinophil % 0.4 0.3 - 6.2 %    Basophil % 0.4 0.0 - 1.5 %    Immature Grans % 0.6 (H) 0.0 - 0.5 %    Neutrophils, Absolute 11.44 (H) 1.70 - 7.00 10*3/mm3    Lymphocytes, Absolute 1.42 0.70 - 3.10 10*3/mm3    Monocytes, Absolute 1.02 (H) 0.10 - 0.90 10*3/mm3    Eosinophils, Absolute 0.06 0.00 - 0.40 10*3/mm3    Basophils, Absolute 0.05 0.00 - 0.20 10*3/mm3    Immature Grans, Absolute 0.09 (H) 0.00 - 0.05 10*3/mm3    nRBC 0.1 0.0 - 0.2 /100 WBC   Protime-INR    Collection Time: 02/14/22  8:47 PM    Specimen: Blood   Result Value Ref Range    Protime 13.4 11.7 - 14.2 Seconds    INR 1.03 0.90 - 1.10   Lactic Acid, Plasma    Collection Time: 02/14/22  8:47 PM    Specimen: Blood   Result  Value Ref Range    Lactate 2.7 (C) 0.5 - 2.0 mmol/L   Procalcitonin    Collection Time: 02/14/22  8:47 PM    Specimen: Blood   Result Value Ref Range    Procalcitonin 0.06 0.00 - 0.25 ng/mL   Magnesium    Collection Time: 02/14/22  8:47 PM    Specimen: Blood   Result Value Ref Range    Magnesium 2.4 1.6 - 2.4 mg/dL   Ethanol    Collection Time: 02/14/22  8:47 PM    Specimen: Blood   Result Value Ref Range    Ethanol <10 0 - 10 mg/dL    Ethanol % <0.010 %   TSH    Collection Time: 02/14/22  8:47 PM    Specimen: Blood   Result Value Ref Range    TSH 1.590 0.270 - 4.200 uIU/mL   T4, Free    Collection Time: 02/14/22  8:47 PM    Specimen: Blood   Result Value Ref Range    Free T4 0.95 0.93 - 1.70 ng/dL   Respiratory Panel PCR w/COVID-19(SARS-CoV-2) FRANCISCO/TASHA/JOSHUA/PAD/COR/MAD/BECKA In-House, NP Swab in [facility], 3-4 HR TAT - Swab, Nasopharynx    Collection Time: 02/14/22  8:48 PM    Specimen: Nasopharynx; Swab   Result Value Ref Range    ADENOVIRUS, PCR Not Detected Not Detected    Coronavirus 229E Not Detected Not Detected    Coronavirus HKU1 Not Detected Not Detected    Coronavirus NL63 Not Detected Not Detected    Coronavirus OC43 Not Detected Not Detected    COVID19 Detected (C) Not Detected - Ref. Range    Human Metapneumovirus Not Detected Not Detected    Human Rhinovirus/Enterovirus Not Detected Not Detected    Influenza A PCR Not Detected Not Detected    Influenza B PCR Not Detected Not Detected    Parainfluenza Virus 1 Not Detected Not Detected    Parainfluenza Virus 2 Not Detected Not Detected    Parainfluenza Virus 3 Not Detected Not Detected    Parainfluenza Virus 4 Not Detected Not Detected    RSV, PCR Not Detected Not Detected    Bordetella pertussis pcr Not Detected Not Detected    Bordetella parapertussis PCR Not Detected Not Detected    Chlamydophila pneumoniae PCR Not Detected Not Detected    Mycoplasma pneumo by PCR Not Detected Not Detected   Blood Gas, Arterial -    Collection Time: 02/14/22  9:12 PM     Specimen: Arterial Blood   Result Value Ref Range    Site Arterial: right radial     Bro's Test Positive     pH, Arterial 7.196 (C) 7.350 - 7.450 pH units    pCO2, Arterial 84.7 (C) 35.0 - 45.0 mm Hg    pO2, Arterial 77.0 (L) 80.0 - 100.0 mm Hg    HCO3, Arterial 32.8 (H) 22.0 - 28.0 mmol/L    Base Excess, Arterial 1.2 0.0 - 2.0 mmol/L    O2 Saturation Calculated 90.6 (L) 92.0 - 99.0 %    A-a Gradiant 0.3 mmHg    Barometric Pressure for Blood Gas 761.6 mmHg    Modality BiPap     FIO2 50 %    Set Tidal Volume 454     Set Mech Resp Rate 20     Rate 25 Breaths/minute       Ordered the above labs and independently reviewed the results.        RADIOLOGY  XR Chest 1 View    Result Date: 2/14/2022  PORTABLE CHEST 02/14/2022 AT 8:39 PM  CLINICAL HISTORY: Dyspnea  Compared to the previous chest dated 01/19/2022.  The lungs are fairly well-expanded and appear free of infiltrates. There are no pleural effusions. The heart is moderately enlarged and unchanged. The pulmonary vasculature is within normal limits.  IMPRESSIONS: Moderate cardiomegaly. No evidence of acute disease within the chest.  This report was finalized on 2/14/2022 9:22 PM by Dr. Rod Lawson M.D.        I ordered the above noted radiological studies. Independently reviewed by me and discussed with radiologist.  See dictation above for official radiology interpretation.      PROCEDURES    Critical Care  Performed by: Alo Snyder MD  Authorized by: Alo Snyder MD     Critical care provider statement:     Critical care time (minutes):  40    Critical care time was exclusive of:  Separately billable procedures and treating other patients    Critical care was necessary to treat or prevent imminent or life-threatening deterioration of the following conditions:  CNS failure or compromise and respiratory failure    Critical care was time spent personally by me on the following activities:  Development of treatment plan with patient or surrogate,  discussions with consultants, discussions with primary provider, evaluation of patient's response to treatment, examination of patient, obtaining history from patient or surrogate, ordering and performing treatments and interventions, ordering and review of laboratory studies, ordering and review of radiographic studies, pulse oximetry, re-evaluation of patient's condition and review of old charts    I assumed direction of critical care for this patient from another provider in my specialty: no          EKG    EKG time: 2032  Rhythm/Rate: Sinus rhythm at 72 with frequent PVCs and PACs  Right bundle branch block  No Acute Ischemia  Non-Specific ST-T changes    Unchanged compared to prior on 1/19/2022    Interpreted Contemporaneously by me.  Independently viewed by me      MEDICATIONS GIVEN IN ER    Medications   sodium chloride 0.9 % flush 10 mL (has no administration in time range)   sodium chloride 0.9 % flush 10 mL (has no administration in time range)   furosemide (LASIX) injection 40 mg (0 mg Intravenous Hold 2/15/22 0000)   piperacillin-tazobactam (ZOSYN) 3.375 g in iso-osmotic dextrose 50 ml (premix) (0 g Intravenous Hold 2/15/22 0000)   piperacillin-tazobactam (ZOSYN) 3.375 g in iso-osmotic dextrose 50 ml (premix) (has no administration in time range)   Pharmacy to dose vancomycin (has no administration in time range)   Pharmacy Consult - Remdesivir (has no administration in time range)   remdesivir 200 mg in sodium chloride 0.9 % 290 mL IVPB (powder vial) (has no administration in time range)     Followed by   remdesivir 100 mg in sodium chloride 0.9 % 250 mL IVPB (powder vial) (has no administration in time range)   dexamethasone (DECADRON) injection 6 mg (has no administration in time range)   dextrose (GLUTOSE) oral gel 15 g (has no administration in time range)   dextrose (D50W) (25 g/50 mL) IV injection 25 g (has no administration in time range)   glucagon (human recombinant) (GLUCAGEN DIAGNOSTIC)  injection 1 mg (has no administration in time range)   sodium chloride 0.9 % flush 10 mL (10 mL Intravenous Given 2/14/22 2350)   sodium chloride 0.9 % flush 10 mL (has no administration in time range)   insulin regular (humuLIN R,novoLIN R) injection 0-14 Units (has no administration in time range)   methylPREDNISolone sodium succinate (SOLU-Medrol) injection 125 mg (125 mg Intravenous Given 2/14/22 2313)   furosemide (LASIX) injection 80 mg (80 mg Intravenous Given 2/14/22 2313)   piperacillin-tazobactam (ZOSYN) 3.375 g in iso-osmotic dextrose 50 ml (premix) (3.375 g Intravenous New Bag 2/14/22 2313)   vancomycin 2250 mg/500 mL 0.9% NS IVPB ([facility]) (2,250 mg Intravenous New Bag 2/14/22 2313)         PROGRESS, DATA ANALYSIS, CONSULTS, AND MEDICAL DECISION MAKING    All labs have been independently reviewed by me.  All radiology studies have been reviewed by me and discussed with radiologist dictating report.   EKG's independently reviewed by me.  Discussion below represents my analysis of pertinent findings related to patient's condition, differential diagnosis, treatment plan and final disposition.    Upon review of the patient's medical record it shows that he was admitted from the hospital from January 16 through January 24 with acute respiratory failure with hypoxia and hypercapnia secondary acute CHF and hypertensive emergency    ED Course as of 02/15/22 0002   Mon Feb 14, 2022 2037 On initial evaluation patient is markedly hypoxic with saturations in the 60s.  We have placed him on a nonrebreather and his oxygen saturations have improved to the 90s and he is more responsive at this point.  Due to his history of hypercapnic and hypoxic respiratory failure 1 month ago I will go ahead and initiate BiPAP while obtaining labs, ABG, EKG, chest x-ray and treat him with Solu-Medrol while awaiting his work-up.  Of note is the patient's blood sugar is 234. [GP]   2056 After being placed on BiPAP the patient is  breathing much more comfortably with oxygen saturations of 95%. [GP]   2116 The patient's ABG after approximately 30 minutes on BiPAP still shows him to be in acute hypercapnic respiratory failure.  We have increased his pressure and rate at this time. [GP]   2128 The patient's BNP is elevated 11,000 950 and thus I will give him a dose of Lasix.  He does have an elevated lactic acid as well and thus I will cover him with antibiotics but will hold on a sepsis fluid bolus because I feel he is fluid overloaded. [GP]   2249 Patient is Covid positive. [GP]   2255 On repeat examination the patient is tolerating the BiPAP well and is much more alert.  I advised that he had Covid and will need to be admitted to the hospital again.  Patient understands and agrees with plan. [GP]   2303 I just discussed the patient at length with his son.  The son states the patient was doing well while he was in rehab but for the past 4 days he has been back home and it does not appear he is been taking his medications or using his CPAP.  I advised the son the patient is critically ill again and will need to go to the ICU tonight.  The son understands. [GP]   2303 I discussed the case with Dr. Peña from pulmonary.  He is aware the patient is in acute respiratory failure and on BiPAP and has Covid.  He will admit the patient to the intensive care unit. [GP]      ED Course User Index  [GP] Alo Snyder MD           The differential diagnosis includes but is not limited to pneumonia, congestive heart failure, pulmonary embolism, pleural effusion, acute coronary syndrome, chronic obstructive pulmonary disease exacerbation, or pneumothorax.        AS OF 00:02 EST VITALS:    BP - 108/78  HR - 68  TEMP -    02 SATS - (!) 87%        DIAGNOSIS  Final diagnoses:   Acute respiratory failure with hypoxia and hypercapnia (HCC)   Altered mental status, unspecified altered mental status type   COVID-19   Congestive heart failure, unspecified HF  chronicity, unspecified heart failure type (HCC)   Sepsis, due to unspecified organism, unspecified whether acute organ dysfunction present (HCC)         DISPOSITION  ADMISSION    Discussed treatment plan and reason for admission with pt/family and admitting physician.  Pt/family voiced understanding of the plan for admission for further testing/treatment as needed.        EMR Dragon/Transcription disclaimer:   Much of this encounter note is an electronic transcription/translation of spoken language to printed text.        Alo Snyder MD  02/15/22 0002

## 2022-02-15 NOTE — PROGRESS NOTES
[facility]  Clinical Pharmacy Department     Remdesivir Review Note    Jesus Izaguirre Jr. is a 76 y.o. male with confirmed COVID-19 infection on day 1 of hospitalization.     Consulting Provider:  Dr. Peña  Date of Confirmed SARS-CoV-2: 2/14/22  Date of Symptom Onset: ~ 4 days prior admission  Other Antimicrobials: Vancomycin, Zosyn (pharmacy dosing)  Hydroxychloroquine or chloroquine prior to arrival: No    Allergies  Allergies as of 02/14/2022    (No Known Allergies)       Microbiology:  Microbiology Results (last 10 days)       Procedure Component Value - Date/Time    Respiratory Panel PCR w/COVID-19(SARS-CoV-2) FRANCISCO/TASHA/JOSHUA/PAD/[facility]/MAD/[facility]KA In-House, NP Swab in UTM/VTM, 3-4 HR TAT - Swab, Nasopharynx [[id number]]  (Abnormal) Collected: 02/14/22 2048    Lab Status: Final result Specimen: Swab from Nasopharynx Updated: 02/14/22 2152     ADENOVIRUS, PCR Not Detected     Coronavirus 229E Not Detected     Coronavirus HKU1 Not Detected     Coronavirus NL63 Not Detected     Coronavirus OC43 Not Detected     COVID19 Detected     Human Metapneumovirus Not Detected     Human Rhinovirus/Enterovirus Not Detected     Influenza A PCR Not Detected     Influenza B PCR Not Detected     Parainfluenza Virus 1 Not Detected     Parainfluenza Virus 2 Not Detected     Parainfluenza Virus 3 Not Detected     Parainfluenza Virus 4 Not Detected     RSV, PCR Not Detected     Bordetella pertussis pcr Not Detected     Bordetella parapertussis PCR Not Detected     Chlamydophila pneumoniae PCR Not Detected     Mycoplasma pneumo by PCR Not Detected    Narrative:      In the setting of a positive respiratory panel with a viral infection PLUS a negative procalcitonin without other underlying concern for bacterial infection, consider observing off antibiotics or discontinuation of antibiotics and continue supportive care. If the respiratory panel is positive for atypical bacterial infection (Bordetella pertussis, Chlamydophila pneumoniae, or  Mycoplasma pneumoniae), consider antibiotic de-escalation to target atypical bacterial infection.            Vitals/Labs/I&O  [unfilled]    Results from last 7 days   Lab Units 02/14/22 2047   WBC 10*3/mm3 14.08*     Results from last 7 days   Lab Units 02/14/22 2047   PROCALCITONIN ng/mL 0.06     Results from last 7 days   Lab Units 02/14/22 2047   AST (SGOT) U/L 44*      Results from last 7 days   Lab Units 02/14/22 2047   ALT (SGPT) U/L 53*       Estimated Creatinine Clearance: 65.1 mL/min (by C-G formula based on SCr of 1.27 mg/dL).  Results from last 7 days   Lab Units 02/14/22 2047   BUN mg/dL 27*   CREATININE mg/dL 1.27     Intake & Output (last 3 days)         02/12 0701  02/13 0700 02/13 0701  02/14 0700 02/14 0701  02/15 0700    Urine (mL/kg/hr)   550    Total Output   550    Net   -550           Urine Unmeasured Occurrence   1 x            Assessment/Plan:    Patient meets the following inclusion/exclusion criteria:  Patient is hospitalized with confirmed COVID-19 infection (and accompanying symptoms)  Patient meets one of the two below criteria:  Patient is requiring an increase in baseline supplemental oxygen requirements OR SpO2 </= 94% on room air secondary to COVID-19 infection Baseline and daily LFTs and Scr have been ordered prior to remdesivir initiation  ALT is not ? 10 times the upper limit of normal  Patient is not on concomitant hydroxychloroquine or chloroquine  Patient does not require invasive mechanical ventilation (IMV) or ECMO  Patient is within  within 7 days of symptom onset     Remdesivir 200 mg IV x 1 dose, followed by 100 mg IV q24h for 4 days or until hospital discharge (whichever comes first) has been ordered.    Thank you for involving pharmacy in this patient's care. Please contact pharmacy with any questions or concerns.                           Jeronimo Cates, [facility]  Clinical Pharmacist  02/15/22 01:13 EST

## 2022-02-15 NOTE — CONSULTS
Hospital Consult    Patient Name: Jesus Izaguirre Jr.  Age/Sex: 76 y.o. male  : 1945  MRN: [id number]    Date of Admission: 2022  Date of Encounter Visit: 02/15/22  Encounter Provider: Ho Espinoza MD        Referring Provider: Olimpia Peña MD  Patient Care Team:  Jason Joya APRN as PCP - General (Family Medicine)    Subjective:   Consulted for: Bradycardia    Chief Complaint:  Altered mental status    History of Present Illness:  Jesus Izaguirre Jr. is a 76 y.o. male normally followed by Dr. Hudson with a history of obesity, PAF, HTN, stroke, HLD, and ongoing tobacco abuse.     His propafenone was stopped in the past due to wide QRS.    Dr. Alcantara saw him in hospital consultation on 22 for concerns of heart block in the setting of pulmonary edema/CHF. EMS had found him in respiratory distress and there was concern if he had been compliant with medications as all of his medication bottles were still full. He was diuresed and placed on bipap and eventually intubated. Dr. Alcantara reviewed his EKGS and telemetry and felt that he was in a sinus arrhythmia, right bundle branch block, left anterior fascicular block, and previously documented bradycardia- no clear evidence of heart block. He had an echocardiogram on 22 which found EF of 30%, left ventricular global hypokinesis, grade I diastolic dysfunction, & mildly increased atrial volume. Given elevated creatine, cardiac catheterization was held off, but there were plans for outpatient ischemic work up. Low dose coreg was added to his regimen to help with his cardiomyopathy, which was then switched to 25 mg of metoprolol tartrate BID for issues with tachycardia. He was to resume apixaban at discharge. He was sent home on 22 with 20 mg of lisinopril as well.     He came into the hospital yesterday for altered mental status where he was difficult to arouse at home. His O2 sats were in the 60s and he was placed on bipap in the ER.  He testes positive for covid19 yesterday. He was started on 40 mg of lasix BID, decadron, and remdesivir.    Chest x ray was negative for acute processes.    Negative troponin x 1. proBNP of 11,950. WBC of 15.97. creatine of 1.33.     Previous Testing-  Echocardiogram on 1/17/22-  Interpretation Summary    · The quality of the study is limited due to patient body habitus, patient positioning and patient being intubated.  · The right ventricular cavity is borderline dilated.  · Mildly reduced right ventricular systolic function noted.  · The left ventricular cavity is mildly dilated.  · Estimated left ventricular EF = 30% Left ventricular systolic function is moderately decreased.  · There is left ventricular global hypokinesis noted.  · The following left ventricular wall segments are hypokinetic: mid anterior, apical anterior, basal anterolateral, mid anterolateral, apical lateral, basal inferolateral, mid inferolateral, apical inferior, mid inferior, apical septal, basal inferoseptal, mid inferoseptal, apex hypokinetic, mid anteroseptal, basal anterior, basal inferior and basal inferoseptal.  · Left atrial volume is mildly increased.  · Left ventricular diastolic function is consistent with (grade I) impaired relaxation.    Echocardiogram on 5/19/2021  · Calculated left ventricular EF = 50.2% Estimated left ventricular EF was in agreement with the calculated left ventricular EF. Left ventricular systolic function is low normal. Normal left ventricular cavity size noted. Left ventricular wall thickness is consistent with moderate concentric hypertrophy. Left ventricular diastolic function is consistent with (grade II w/high LAP) pseudonormalization.  · The left atrial cavity is mildly dilated.    Holter Monitor 3/9/2017  Study Findings     No symptoms reported during the monitoring period. No complications noted. The predominant rhythm noted during the testing period was sinus rhythm. Premature atrial contractions  occured frequently. There were 288 premature atrial contractions per hour. There was no evidence of atrial arrhythmias. There were no episodes of supraventricular tachycardia. Premature ventricular contractions occured frequently. There were 83 premature ventricular contractions per hour. Ventricular couplets. There was one episodes of ventricular tachycardia. The peak heart rate was 122 beats per minute. One 5-bt run VT.. Sinoatrial node conduction was normal. No atrioventricular block noted.      Study Impressions     An abnormal monitor study.               Past Medical History:  Past Medical History:   Diagnosis Date   • Acute on chronic combined systolic and diastolic CHF (congestive heart failure) ([facility]) 1/20/2022   • Asthma    • BPH (benign prostatic hypertrophy)    • Class 2 obesity in adult 4/7/2019   • Diabetes mellitus ([facility])    • Fatigue    • HLD (hyperlipidemia) 7/5/2016   • Hypertension    • Nephrolithiasis    • Stroke ([facility]) 12/2015   • Tobacco abuse 3/24/2018   • Type 2 diabetes mellitus with diabetic polyneuropathy, without long-term current use of insulin ([facility])    • Venous insufficiency        Past Surgical History:   Procedure Laterality Date   • LITHOTRIPSY      Renal       Home Medications:   Medications Prior to Admission   Medication Sig Dispense Refill Last Dose   • apixaban (ELIQUIS) 5 MG tablet tablet Take 5 mg by mouth 1 (One) Time.      • arformoterol (BROVANA) 15 MCG/2ML nebulizer solution Take 2 mL by nebulization 2 (Two) Times a Day. 120 mL     • aspirin 325 MG tablet Take 1 tablet by mouth Daily. 90 tablet 3    • atorvastatin (LIPITOR) 80 MG tablet Take 1 tablet by mouth every night at bedtime. 90 tablet 3    • budesonide (PULMICORT) 0.5 MG/2ML nebulizer solution Take 4 mL by nebulization 2 (Two) Times a Day.      • Dapagliflozin Propanediol (Farxiga) 10 MG tablet Take 10 mg by mouth Daily. 90 tablet 3    • furosemide (LASIX) 40 MG tablet Take 1 tablet by mouth 2 (Two) Times a Day.       • glucose blood test strip Amber Contour Next Test In Vitro Strip; Patient Sig: Amber Contour Next Test In Vitro Strip TEST ONCE DAILY; 50; 3; 04-Mar-2015; Active 100 each 3    • insulin regular (humuLIN R,novoLIN R) 100 UNIT/ML injection Inject 0-9 Units under the skin into the appropriate area as directed 4 (Four) Times a Day Before Meals & at Bedtime.  12    • ipratropium-albuterol (DUO-NEB) 0.5-2.5 mg/3 ml nebulizer Take 3 mL by nebulization Every 6 (Six) Hours As Needed for Wheezing or Shortness of Air. 360 mL     • lisinopril (PRINIVIL,ZESTRIL) 20 MG tablet Take 1 tablet by mouth Daily.      • metoprolol succinate XL (TOPROL-XL) 25 MG 24 hr tablet Take 1 tablet by mouth Daily. 90 tablet 3    • Petrolatum 42 % ointment Apply 1 application topically to the appropriate area as directed Every 12 (Twelve) Hours.      • vitamin D (ERGOCALCIFEROL) 1.25 MG (92014 UT) capsule capsule Take 1 capsule by mouth 1 (One) Time Per Week. 12 capsule 3        Allergies:  No Known Allergies    Past Social History:  Social History     Socioeconomic History   • Marital status:    Tobacco Use   • Smoking status: Former Smoker     Packs/day: 1.00     Quit date:      Years since quittin.1   • Smokeless tobacco: Former User   • Tobacco comment: Cessation 2014; HX of 1 ppd for 52 yrs.   Vaping Use   • Vaping Use: Never used   Substance and Sexual Activity   • Alcohol use: No     Comment: caffeine use    • Drug use: No   • Sexual activity: Defer        Past Family History:  Family History   Problem Relation Age of Onset   • Arthritis Mother    • Cancer Mother    • Migraines Mother    • Hypertension Other        Review of Systems: Unable to obtain  Objective:     Objective:  Temp:  [96.5 °F (35.8 °C)] 96.5 °F (35.8 °C)  Heart Rate:  [43-86] 53  Resp:  [24-32] 24  BP: ()/(52-95) 99/68    Intake/Output Summary (Last 24 hours) at 2/15/2022 2620  Last data filed at 2/15/2022 0550  Gross per 24 hour   Intake 304 ml    Output 600 ml   Net -296 ml     Body mass index is 34.29 kg/m².      02/15/22  0038 02/15/22  0516   Weight: 116 kg (255 lb 8.2 oz) 115 kg (252 lb 13.9 oz)           Physical Exam:   Vitals reviewed.   Constitutional:       Appearance: Well-developed. Obese. Chronically ill-appearing.   HENT:      Head: Normocephalic.   Neck:      Thyroid: No thyromegaly.      Vascular: No carotid bruit or JVD.   Pulmonary:      Effort: Pulmonary effort is normal.      Breath sounds: Normal breath sounds.   Cardiovascular:      Bradycardia present. Irregular rhythm. Normal S1. Normal S2.      Murmurs: There is no murmur.      No gallop.   Edema:     Peripheral edema absent.   Skin:     General: Skin is warm and dry.      Findings: No erythema.   Neurological:      Mental Status: Lethargic.           Lab Review:     Results from last 7 days   Lab Units 02/15/22  0301 02/15/22  0048 02/14/22 2047 02/14/22 2047   SODIUM mmol/L 145  --   --  137   POTASSIUM mmol/L 4.8  --   --  4.4   CHLORIDE mmol/L 107  --   --  101   CO2 mmol/L 29.0  --   --  27.3   BUN mg/dL 27*  --   --  27*   CREATININE mg/dL 1.33* 1.35*  --  1.27   GLUCOSE mg/dL 180*  --    < > 270*   CALCIUM mg/dL 8.2*  --   --  9.1    < > = values in this interval not displayed.       Results from last 7 days   Lab Units 02/14/22 2047   TROPONIN T ng/mL <0.010     Results from last 7 days   Lab Units 02/15/22  0301   WBC 10*3/mm3 15.97*   HEMOGLOBIN g/dL 14.2   HEMATOCRIT % 45.1   PLATELETS 10*3/mm3 220     Results from last 7 days   Lab Units 02/14/22 2047   INR  1.03         Results from last 7 days   Lab Units 02/15/22  0301   MAGNESIUM mg/dL 2.1         Results from last 7 days   Lab Units 02/14/22 2047   PROBNP pg/mL 11,950.0*         Results from last 7 days   Lab Units 02/14/22 2047   TSH uIU/mL 1.590       Imaging:      Imaging Results (Most Recent)     Procedure Component Value Units Date/Time    XR Chest 1 View [[id number]] Collected: 02/14/22 2121     Updated:  22    Narrative:      PORTABLE CHEST 2022 AT 8:39 PM     CLINICAL HISTORY: Dyspnea     Compared to the previous chest dated 2022.     The lungs are fairly well-expanded and appear free of infiltrates. There  are no pleural effusions. The heart is moderately enlarged and  unchanged. The pulmonary vasculature is within normal limits.     IMPRESSIONS: Moderate cardiomegaly. No evidence of acute disease within  the chest.     This report was finalized on 2022 9:22 PM by Dr. Rod Lawson M.D.             EK/15/22-      EKG on 22-      Assessment:   Assessment/Plan         Acute respiratory failure with hypoxia and hypercapnia (HCC)  1. Acute repiratory failure  2. Acute/ Chronic Systolic CHF: on IV lasix.  Can't tolerate BB or ACE/Arb due to CKD.  CXR fairly clear.  3. Encephalopathy: new  4. Bradycardia: hold BB  5. Covid 19      Plan:     Continue IV diuretics  Check D-Dimer - R/O PE    Portions of above entered by Leah White RN validated.    Thank you for allowing me to participate in the care of Jesus Izaguirre . Feel free to contact me directly with any further questions or concerns.    Ho Espinoza MD  [facility]  02/15/22  07:57 EST

## 2022-02-15 NOTE — CASE MANAGEMENT/SOCIAL WORK
Discharge Planning Assessment  [facility]     Patient Name: Jesus Izaguirre Jr.  MRN: [id number]  Today's Date: 2/15/2022    Admit Date: 2/14/2022     Discharge Needs Assessment     Row Name 02/15/22 1404       Living Environment    Lives With child(bennie), adult    Current Living Arrangements home/apartment/condo    Potentially Unsafe Housing Conditions unable to assess    Primary Care Provided by self    Provides Primary Care For no one    Family Caregiver if Needed none    Quality of Family Relationships supportive    Able to Return to Prior Arrangements yes       Resource/Environmental Concerns    Resource/Environmental Concerns none    Transportation Concerns car, none       Transition Planning    Patient/Family Anticipates Transition to home with family    Patient/Family Anticipated Services at Transition rehabilitation services    Transportation Anticipated family or friend will provide       Discharge Needs Assessment    Equipment Currently Used at Home cane, straight    Concerns to be Addressed discharge planning    Anticipated Changes Related to Illness none    Equipment Needed After Discharge none               Discharge Plan     Row Name 02/15/22 1402       Plan    Plan Return to [facility] depending on bed availability    Plan Comments IMM noted .  CCP spoke to patient’s son Paco [phone] via telephone.    CCP role explained and discharge planning discussed.  Face sheet verified.   Pt PCP is Jason CARDONA.    Pt’s emergency contact is his son.   Pt lives in a one-story house with his son.   He uses a cane to ambulate.   He is independent with ADL’s.  He has a past rehab history at the [facility].  They would like a referral to the [facility]. Eva messaged   He has no history of HH. He obtains his medications from [facility]’s   pharmacy on [address].  Plan is rehab. CCP following for acceptance              Continued Care and Services - Admitted Since 2/14/2022    Coordination has not been started  for this encounter.     Selected Continued Care - Prior Encounters Includes selections from prior encounters from 11/16/2021 to 2/15/2022    Discharged on 1/24/2022 Admission date: 1/16/2022 - Discharge disposition: Skilled Nursing Facility (DC - External)    Destination     Service Provider Selected Services Address Phone Fax Patient Preferred    [facility]  Skilled Nursing [address] [id number] [phone] --                    Expected Discharge Date and Time     Expected Discharge Date Expected Discharge Time    Feb 22, 2022          Demographic Summary    No documentation.                Functional Status    No documentation.                Psychosocial    No documentation.                Abuse/Neglect    No documentation.                Legal    No documentation.                Substance Abuse    No documentation.                Patient Forms    No documentation.                   Scarlett Chavarria RN

## 2022-02-15 NOTE — PROGRESS NOTES
"[facility] Clinical Pharmacy Services: Vancomycin Monitoring Note    Jesus Izaguirre Jr. is a 76 y.o. male who is on day 2/7 of pharmacy to dose vancomycin for SSTI.    Previous Vancomycin Dose:   1250 mg IV once  Updated Cultures and Sensitivities: NGTD  MRSA PCR: ordered today  No results found for: VANCOPEAK, VANCOTROUGH, VANCORANDOM    Vitals/Labs  Ht: 182.9 cm (72\"); Wt: 115 kg (252 lb 13.9 oz)   Temp Readings from Last 1 Encounters:   02/15/22 97.5 °F (36.4 °C) (Oral)     Estimated Creatinine Clearance: 61.9 mL/min (A) (by C-G formula based on SCr of 1.33 mg/dL (H)).   n/a    Results from last 7 days   Lab Units 02/15/22  0301 02/15/22  0048 02/14/22  2047   CREATININE mg/dL 1.33* 1.35* 1.27   WBC 10*3/mm3 15.97*  --  14.08*     Assessment/Plan    1. Current Vancomycin Dose: 1500 mg IV every  24  hours; provides a predicted  mg/L.hr   2. We will continue to monitor patient changes and renal function     Thank you for involving pharmacy in this patient's care. Please contact pharmacy with any questions or concerns.       Deedee Stevens, PharmD  Clinical Pharmacist          "

## 2022-02-15 NOTE — ED NOTES
Pt to triage from home with lethargy and increased confusion per son.      Suyapa Hernandez RN  02/14/22 2022

## 2022-02-15 NOTE — PROGRESS NOTES
[facility]  [phone]  Travis Giang MD    Subjective:  LOS: 1    Chief Complaint: Respiratory failure    Probably chronic respiratory failure and needs AVAPS. Taken off NIV this morning and so far awake and tolerating.    Objective   Vital Signs past 24hrs    Temp range: Temp (24hrs), Av °F (36.1 °C), Min:96.5 °F (35.8 °C), Max:97.5 °F (36.4 °C)    BP range: BP: ()/() 131/74  Pulse range: Heart Rate:  [43-86] 55  Resp rate range: Resp:  [24-32] 24    Device (Oxygen Therapy): nasal cannulaFlow (L/min):  [6] 6  Oxygen range:SpO2:  [66 %-99 %] 92 %      115 kg (252 lb 13.9 oz); Body mass index is 34.29 kg/m².    Intake/Output Summary (Last 24 hours) at 2/15/2022 1250  Last data filed at 2/15/2022 0550  Gross per 24 hour   Intake 304 ml   Output 600 ml   Net -296 ml       Physical Exam  Constitutional:       Appearance: He is obese.   Cardiovascular:      Rate and Rhythm: Bradycardia present.      Heart sounds: No murmur heard.      Pulmonary:      Effort: Pulmonary effort is normal.      Breath sounds: Decreased breath sounds present.   Abdominal:      General: Bowel sounds are normal.      Palpations: Abdomen is soft. There is no mass.      Tenderness: There is no abdominal tenderness.   Musculoskeletal:         General: Swelling (chronic appearing LE's) present.   Neurological:      Mental Status: He is alert and oriented to person, place, and time.       Results Review:    I have reviewed the laboratory and imaging data since the last note by [facility] physician.  My annotations are noted in assessment and plan.    Results from last 7 days   Lab Units 02/15/22  1005 02/15/22  0048 22  2047   PROCALCITONIN ng/mL  --   --  0.06   LACTATE mmol/L  --  1.9 2.7*   D DIMER QUANT MCGFEU/mL 0.96*  --   --        Medication Review:  I have reviewed the current MAR.  My annotations are noted in assessment and plan.    Pharmacy Consult - Remdesivir,   Pharmacy to dose vancomycin,       Plan    PCCM Problems  Altered mental status  SARS-CoV-2 infection  Acute on chronic respiratory failure, hypoxia and hypercapnia  Acute on chronic systolic and diastolic congestive heart failure  Bradycardia  Pulmonary infiltrates consistent with pulmonary edema  Paroxysmal atrial fibrillation on AC  History stroke  Acute kidney injury  Sepsis/cellulitis  Diabetes type 2  Ex-smoker  Suspected COPD  YEISON on CPAP  Morbid obesity  Cardiomyopathy, EF 30%    .    Plan of Treatment    Alert and oriented now.    Tolerates NIV. Will need AVAPS at home. Keep ICU today for assurance then transfer out tomorrow.    This patient has chronic respiratory failure and requires non-invasive ventilation. Other modalities such as CPAP and BIPAP were considered but ruled out due to severity of lung disease and their ineffectiveness in this condition. Non-invasive-ventilation is required to decrease work of breathing, improve pulmonary status and interruption of respiratory support which could lead to serious harm or death.    Settings on Trilogy:  Mode- AVAPS-AE; Rate - Auto; Goal Tidal Volume 500cc; IPAP max 30; EPAP max 20; EPAP min 4;  PS max 16; PS min 4; FiO2 - O2 bled in at 2L      Acute on chronic systolic and diastolic CHF. On diuretics - hold today due to elevated creatinine.    Bradycardia - no BB. Paroxysmal afib on AC.    Acute kidney injury - now off iv diuretics. Watch.     Suspected cellulitis on admission-started on Zosyn and Vancomycin.  His procalcitonin was normal. Will stop abx if cultures NG.    On a sliding scale for his diabetes.  Regular diet today.      Travis Giang MD  02/15/22  12:50 EST    While in the room and during my examination of the patient I wore gloves, gown, mask, eye protection and followed enhanced droplet/contact isolation protocol and precautions.  I washed my hands before and after this patient encounter.    Part of this note may be an electronic transcription/translation of spoken language to printed  text using the Dragon Dictation System.

## 2022-02-15 NOTE — CASE COMMUNICATION
Dear Jason CARDONA,    Re: Jesus Izaguirre Jr.  :1945    The physical therapy visit  on 2/15/2022 for the above patient was missed due to patient hospitalized , therefore, the prescribed frequency of visits was not met.    If you have questions or would like further information about this patient, please contact us at [phone].    Regards,   Clarita Rock MPT

## 2022-02-15 NOTE — ED NOTES
Patient wearing mask, nurse wearing mask, n95, gown, gloves and protective eyewear during care and assessment.  Hand hygiene performed prior to and post care.      Toño Mcginnis RN  02/14/22 2046

## 2022-02-15 NOTE — H&P
H&P NOTE    Patient Identification:  Jesus Izaguirre Jr.  76 y.o.  male  1945  [id number]              CC: Altered mental status    History of Present Illness:  76-year-old gentleman recently discharged around 3 weeks ago from the hospital after being admitted with acute hypoxemic hypercapnic respiratory failure with combined systolic and diastolic CHF.  Patient has known history of cardiomyopathy EF 30%.  Also has underlying history of A. fib on anticoagulation.  Patient now presented from home with altered mental status difficult to arouse.  He was noted to have sats in the 60s in the emergency room on admission and was immediately placed on noninvasive ventilation/BiPAP.  Currently on noninvasive ventilation and somewhat confused unable to give me any meaningful history.  There is some history of noncompliance with his medication and home CPAP.  Patient also tested positive for COVID-19 in the emergency room.  There is no history of fall or aspiration as such.  Unable to get any meaningful history from the patient due to confusion      Review of Systems  As above and limited with patient's current mental status and on noninvasive ventilation  Past Medical History:  Past Medical History:   Diagnosis Date   • Acute on chronic combined systolic and diastolic CHF (congestive heart failure) (HCC) 1/20/2022   • Asthma    • BPH (benign prostatic hypertrophy)    • Class 2 obesity in adult 4/7/2019   • Diabetes mellitus ([facility])    • Fatigue    • HLD (hyperlipidemia) 7/5/2016   • Hypertension    • Nephrolithiasis    • Stroke ([facility]) 12/2015   • Tobacco abuse 3/24/2018   • Type 2 diabetes mellitus with diabetic polyneuropathy, without long-term current use of insulin ([facility])    • Venous insufficiency        Past Surgical History:  Past Surgical History:   Procedure Laterality Date   • LITHOTRIPSY      Renal        Home Meds:  (Not in a hospital admission)      Allergies:  No Known Allergies    Social History:   Social History  "    Socioeconomic History   • Marital status:    Tobacco Use   • Smoking status: Former Smoker     Packs/day: 1.00     Quit date:      Years since quittin.1   • Smokeless tobacco: Former User   • Tobacco comment: Cessation 2014; HX of 1 ppd for 52 yrs.   Vaping Use   • Vaping Use: Never used   Substance and Sexual Activity   • Alcohol use: No     Comment: caffeine use    • Drug use: No   • Sexual activity: Defer       Family History:  Family History   Problem Relation Age of Onset   • Arthritis Mother    • Cancer Mother    • Migraines Mother    • Hypertension Other        Physical Exam:  /95   Pulse 66   Resp (!) 32   Ht 180.3 cm (71\")   SpO2 97%   BMI 35.27 kg/m²  Body mass index is 35.27 kg/m². 97%    Physical Exam  Patient is examined using the personal protective equipment as per guidelines from infection control for this particular patient as enacted.  Hand hygiene was performed before and after patient interaction.  Elderly gentleman currently on noninvasive ventilation tolerating well  Confused elderly not following commands  Eyes normal conjunctivae and pupils reactive to light  ENT Mallampati between 3 and 4 normal nasal exam  Neck midline trachea no thyromegaly  Chest no labored breathing clear  CVS regular rate and rhythm 2+ lower extremity edema  Abdomen soft nontender no hepatosplenomegaly  CNS confused not following commands.  Moving all extremities  Skin no rashes no nodules  Psych confused not following commands  Musculoskeletal no cyanosis some venous stasis and chronic redness noted lower extremity        LABS:  Lab Results   Component Value Date    CALCIUM 9.1 2022    PHOS 2.6 2022     Results from last 7 days   Lab Units 227   MAGNESIUM mg/dL 2.4   SODIUM mmol/L 137   POTASSIUM mmol/L 4.4   CHLORIDE mmol/L 101   CO2 mmol/L 27.3   BUN mg/dL 27*   CREATININE mg/dL 1.27   GLUCOSE mg/dL 270*   CALCIUM mg/dL 9.1   WBC 10*3/mm3 14.08*   HEMOGLOBIN " g/dL 16.3   PLATELETS 10*3/mm3 282   ALT (SGPT) U/L 53*   AST (SGOT) U/L 44*   PROBNP pg/mL 11,950.0*   PROCALCITONIN ng/mL 0.06     Lab Results   Component Value Date    TROPONINT <0.010 02/14/2022     Results from last 7 days   Lab Units 02/14/22 2047   TROPONIN T ng/mL <0.010         Results from last 7 days   Lab Units 02/14/22 2047   PROCALCITONIN ng/mL 0.06   LACTATE mmol/L 2.7*     Results from last 7 days   Lab Units 02/14/22 2112   PH, ARTERIAL pH units 7.196*   PCO2, ARTERIAL mm Hg 84.7*   PO2 ART mm Hg 77.0*   MODALITY  BiPap   O2 SATURATION CALC % 90.6*     Results from last 7 days   Lab Units 02/14/22 2048   ADENOVIRUS DETECTION BY PCR  Not Detected   CORONAVIRUS 229E  Not Detected   CORONAVIRUS HKU1  Not Detected   CORONAVIRUS NL63  Not Detected   CORONAVIRUS OC43  Not Detected   HUMAN METAPNEUMOVIRUS  Not Detected   HUMAN RHINOVIRUS/ENTEROVIRUS  Not Detected   INFLUENZA B PCR  Not Detected   PARAINFLUENZA 1  Not Detected   PARAINFLUENZA VIRUS 2  Not Detected   PARAINFLUENZA VIRUS 3  Not Detected   PARAINFLUENZA VIRUS 4  Not Detected   BORDETELLA PERTUSSIS PCR  Not Detected   BORDETELLA PARAPERTUSSIS PCR  Not Detected   CHLAMYDOPHILA PNEUMONIAE PCR  Not Detected   MYCOPLAMA PNEUMO PCR  Not Detected   RSV, PCR  Not Detected     Results from last 7 days   Lab Units 02/14/22 2047   INR  1.03         Lab Results   Component Value Date    TSH 1.590 02/14/2022     Estimated Creatinine Clearance: 63.8 mL/min (by C-G formula based on SCr of 1.27 mg/dL).         Imaging: I personally visualized the images of scans/x-rays performed within last 3 days.      Assessment:  Acute hypoxemic hypercapnic respiratory failure on noninvasive ventilation  Acute metabolic encephalopathy  Acute on chronic systolic heart failure  Positive for COVID-19 infection  Lactic acidosis  Acute cardiogenic pulmonary edema  Paroxysmal A. fib on anticoagulation  Possible cellulitis with sepsis  Cardiomyopathy EF 30%  Suspected  COPD  YEISON on CPAP  Morbid obesity        Recommendations:  At this time we have a elderly gentleman with multiple medical problems and issues as listed above.  I suspect his primary issue is congestive heart failure with sats significantly elevated proBNP.  I would go ahead initiate diuretics Lasix 40 mg IV twice daily and monitor urine output  Altered mental status with current respiratory failure and Covid infection with possible sepsis.  If no improvement consider neurology evaluation  Continue noninvasive ventilation currently tolerating well.  Repeat blood gases in the morning.  Positive for COVID-19 infection chest x-ray is not that impressive.  Since patient is significantly hypoxic I will initiate Decadron and remdesivir also.  Trend lactate  Initiate broad-spectrum antibiotic with concerns of cellulitis with lactic acidosis.  ICU core measures  Bronchodilators  Monitor clinical course closely.    Critical care time 40 minutes          Olimpia Peña MD  2/14/2022  23:20 EST      Much of this encounter note is an electronic transcription/translation of spoken language to printed text using Dragon Software.

## 2022-02-16 NOTE — PROGRESS NOTES
Continued Stay Note  [facility]     Patient Name: Jesus Izaguirre Jr.  MRN: [id number]  Today's Date: 2/16/2022    Admit Date: 2/14/2022     Discharge Plan     Row Name 02/16/22 1231       Plan    Plan Home vs rehab    Plan Comments [facility] @ [place] contacted to obtain Trilogy for pt               Discharge Codes    No documentation.               Expected Discharge Date and Time     Expected Discharge Date Expected Discharge Time    Feb 22, 2022             Scarlett Chavarria RN

## 2022-02-16 NOTE — PROGRESS NOTES
"Patient Care Team:  Jason Joya APRN as PCP - General (Family Medicine)    Chief Complaint:    Interval History:   Still on 6 L nasal cannula.    Objective   Vital Signs  Temp:  [96.7 °F (35.9 °C)-97.8 °F (36.6 °C)] 97.5 °F (36.4 °C)  Heart Rate:  [43-70] 51  BP: ()/() 126/61    Intake/Output Summary (Last 24 hours) at 2/16/2022 1014  Last data filed at 2/16/2022 0400  Gross per 24 hour   Intake 770 ml   Output 850 ml   Net -80 ml     Flowsheet Rows      First Filed Value   Admission Height 180.3 cm (71\") Documented at 02/14/2022 2042   Admission Weight 116 kg (255 lb 8.2 oz) Documented at 02/15/2022 0038          Temp:  [96.7 °F (35.9 °C)-97.8 °F (36.6 °C)] 97.5 °F (36.4 °C)  Heart Rate:  [43-70] 51  BP: ()/() 126/61    Intake/Output Summary (Last 24 hours) at 2/16/2022 1014  Last data filed at 2/16/2022 0400  Gross per 24 hour   Intake 770 ml   Output 850 ml   Net -80 ml     Flowsheet Rows      First Filed Value   Admission Height 180.3 cm (71\") Documented at 02/14/2022 2042   Admission Weight 116 kg (255 lb 8.2 oz) Documented at 02/15/2022 0038          General Appearance:    Alert, cooperative, in no acute distress   Head:    Normocephalic, without obvious abnormality, atraumatic       Neck/Lymph   No adenopathy, supple, no thyromegaly, no carotid bruit, no    JVD   Lungs:     Clear to auscultation bilaterally, no wheezes, rales, or     rhonchi    Cardiac:    Normal rate, regular rhythm, no murmur, no rub, no gallop   Chest Wall:    No abnormalities observed   GI:     Normal bowel sounds, soft, nontender, nondistended,            no rebound tenderness   Extremities:   No cyanosis, clubbing.  Trace bilateral lower extremity edema   Circulatory/Peripheral Vascular :   Pulses palpable and equal bilaterally   Integumentary:  Venous stasis changes bilateral lower extremities       Neurologic:   Cranial nerves 2 - 12 grossly intact, sensation intact              apixaban, 5 mg, Oral, " Once  dexamethasone, 6 mg, Intravenous, Daily  insulin regular, 0-14 Units, Subcutaneous, Q6H  piperacillin-tazobactam, 3.375 g, Intravenous, Q8H  remdesivir, 100 mg, Intravenous, Q24H  sodium chloride, 10 mL, Intravenous, Q12H  vancomycin, 1,500 mg, Intravenous, Q24H        Pharmacy Consult - Remdesivir,   Pharmacy to dose vancomycin,         Results Review:    Results from last 7 days   Lab Units 02/15/22  0301   SODIUM mmol/L 145   POTASSIUM mmol/L 4.8   CHLORIDE mmol/L 107   CO2 mmol/L 29.0   BUN mg/dL 27*   CREATININE mg/dL 1.33*   GLUCOSE mg/dL 180*   CALCIUM mg/dL 8.2*     Results from last 7 days   Lab Units 02/14/22  2047   TROPONIN T ng/mL <0.010     Results from last 7 days   Lab Units 02/16/22  0346   WBC 10*3/mm3 13.33*   HEMOGLOBIN g/dL 13.3   HEMATOCRIT % 42.2   PLATELETS 10*3/mm3 212     Results from last 7 days   Lab Units 02/14/22  2047   INR  1.03         Results from last 7 days   Lab Units 02/15/22  0301   MAGNESIUM mg/dL 2.1         @LABRCNT(bnp)@  I reviewed the patient's new clinical results.  I personally viewed and interpreted the patient's EKG/Telemetry data        1/17/22  · The quality of the study is limited due to patient body habitus, patient positioning and patient being intubated.  · The right ventricular cavity is borderline dilated.  · Mildly reduced right ventricular systolic function noted.  · The left ventricular cavity is mildly dilated.  · Estimated left ventricular EF = 30% Left ventricular systolic function is moderately decreased.  · There is left ventricular global hypokinesis noted.  · The following left ventricular wall segments are hypokinetic: mid anterior, apical anterior, basal anterolateral, mid anterolateral, apical lateral, basal inferolateral, mid inferolateral, apical inferior, mid inferior, apical septal, basal inferoseptal, mid inferoseptal, apex hypokinetic, mid anteroseptal, basal anterior, basal inferior and basal inferoseptal.  · Left atrial volume is  mildly increased.  · Left ventricular diastolic function is consistent with (grade I) impaired relaxation      Assessment/Plan   1.  Acute hypoxemic respiratory failure  2.  Acute on chronic systolic heart failure  3.  Encephalopathy: Resolved  4.  COVID-19 pneumonia  5.  Bradycardia: No indication for pacemaker    -Additional dose of IV Lasix today.  Continue supportive measures for COVID-19 pneumonia  -Will need ischemic work-up in the future.

## 2022-02-16 NOTE — PROGRESS NOTES
[facility]  [phone]  Travis Giang MD    Subjective:  LOS: 2    Chief Complaint: Respiratory failure    Probably chronic respiratory failure and needs AVAPS. Taken off NIV yesterday, tolerating nasal cannula O2.  Did not use his NIV last night though instructed to do so moving forward.  Denies cough phlegm fever chills rigors.  No nausea vomiting diarrhea.    Objective   Vital Signs past 24hrs    Temp range: Temp (24hrs), Av.4 °F (36.3 °C), Min:96.7 °F (35.9 °C), Max:97.8 °F (36.6 °C)    BP range: BP: ()/(50-96) 136/96  Pulse range: Heart Rate:  [38-64] 38  Resp rate range:      Device (Oxygen Therapy): nasal cannulaFlow (L/min):  [3-6] 3  Oxygen range:SpO2:  [84 %-100 %] 94 %      117 kg (257 lb 0.9 oz); Body mass index is 34.86 kg/m².    Intake/Output Summary (Last 24 hours) at 2022 1405  Last data filed at 2022 0400  Gross per 24 hour   Intake 720 ml   Output 525 ml   Net 195 ml       Physical Exam  Constitutional:       Appearance: He is obese.   Cardiovascular:      Rate and Rhythm: Bradycardia present.      Heart sounds: No murmur heard.      Pulmonary:      Effort: Pulmonary effort is normal.      Breath sounds: Decreased breath sounds present.   Abdominal:      General: Bowel sounds are normal.      Palpations: Abdomen is soft. There is no mass.      Tenderness: There is no abdominal tenderness.   Musculoskeletal:         General: No swelling.      Comments: Chronic changes to LE's   Neurological:      Mental Status: He is alert and oriented to person, place, and time.       Results Review:    I have reviewed the laboratory and imaging data since the last note by [facility] physician.  My annotations are noted in assessment and plan.    Results from last 7 days   Lab Units 22  0346 02/15/22  1005 02/15/22  0048 22  2047   PROCALCITONIN ng/mL  --   --   --  0.06   LACTATE mmol/L  --   --  1.9 2.7*   D DIMER QUANT MCGFEU/mL  --  0.96*  --   --    CRP mg/dL 1.38*  --    --   --        Medication Review:  I have reviewed the current MAR.  My annotations are noted in assessment and plan.    Pharmacy Consult - Remdesivir,       Plan   [facility] Problems  Altered mental status  SARS-CoV-2 infection  Acute on chronic respiratory failure, hypoxia and hypercapnia  Acute on chronic systolic and diastolic congestive heart failure  Bradycardia  Pulmonary infiltrates consistent with pulmonary edema  Paroxysmal atrial fibrillation on AC  History stroke  Acute kidney injury  Sepsis/cellulitis  Diabetes type 2  Ex-smoker  Suspected COPD  YEISON on CPAP  Morbid obesity  Cardiomyopathy, EF 30%    .    Plan of Treatment    Alert and oriented now.    Tolerates NIV. Will need AVAPS at home. Trilogy machine ordered through [facility]    Supportive treatment for COVID-19 infection on remdesivir.  Currently also on dexamethasone.    This patient has chronic respiratory failure and requires non-invasive ventilation. Other modalities such as CPAP and BIPAP were considered but ruled out due to severity of lung disease and their ineffectiveness in this condition. Non-invasive-ventilation is required to decrease work of breathing, improve pulmonary status and interruption of respiratory support which could lead to serious harm or death.    Settings on Trilogy:  Mode- AVAPS-AE; Rate - Auto; Goal Tidal Volume 500cc; IPAP max 30; EPAP max 20; EPAP min 4;  PS max 16; PS min 4; FiO2 - O2 bled in at 2L      Acute on chronic systolic and diastolic CHF. On diuretics - per cardiology ordered a one time dose Lasix 40mg IV for today.    Bradycardia - no BB. Paroxysmal afib on AC.    Acute kidney injury - monitor.    Results from last 7 days   Lab Units 02/15/22  0301 02/15/22  0048 02/14/22  2047   SODIUM mmol/L 145  --  137   POTASSIUM mmol/L 4.8  --  4.4   CHLORIDE mmol/L 107  --  101   CO2 mmol/L 29.0  --  27.3   BUN mg/dL 27*  --  27*   CREATININE mg/dL 1.33* 1.35* 1.27     Suspected cellulitis on admission-started on  Zosyn and Vancomycin.  His procalcitonin was normal. Will stop abx today.  Blood cultures collected on 2/14/11 CNS  MRSA screen negative  WBCs down from yesterday and no fever 24 hours    On a sliding scale for his diabetes.  Regular diet today.    Transfer.    Travis Giang MD  02/16/22  14:05 EST    While in the room and during my examination of the patient I wore gloves, gown, mask, eye protection and followed enhanced droplet/contact isolation protocol and precautions.  I washed my hands before and after this patient encounter.    Part of this note may be an electronic transcription/translation of spoken language to printed text using the Dragon Dictation System.

## 2022-02-16 NOTE — PAYOR COMM NOTE
"Jesus Mcnamara Jr. (76 y.o. Male)                       ATTENTION;    INITIAL CLINICAL FOR REVIEW CASE REF  [id number]                 REPLY TO [facility] DEPT  [phone] OR CALL  PASCUAL DAVID LPOMAR                 Date of Birth Social Security Number Address Home Phone MRN    1945  [id number] [facility] [address] [phone] [id number]    Oriental orthodox Marital Status             None        Admission Date Admission Type Admitting Provider Attending Provider Department, Room/Bed    2/14/22 Emergency Olimpia Peña MD Sayied, Tausif, MD [facility] CORONARY CARE, N326/1    Discharge Date Discharge Disposition Discharge Destination                         Attending Provider: Olimpia Peña MD    Allergies: No Known Allergies    Isolation: Enh Drop/Con   Infection: COVID (confirmed) (02/14/22)   Code Status: CPR   Advance Care Planning Activity    Ht: 182.9 cm (72\")   Wt: 117 kg (257 lb 0.9 oz)    Admission Cmt: None   Principal Problem: None                Active Insurance as of 2/14/2022     Primary Coverage     Payor Plan Insurance Group Employer/Plan Group    AETNA MEDICARE REPLACEMENT AETNA MEDICARE REPLACEMENT [id number]     Payor Plan Address Payor Plan Phone Number Payor Plan Fax Number Effective Dates    [address] [phone]  4/1/2021 - None Entered    [facility] [id number]       Subscriber Name Subscriber Birth Date Member ID       JESUS MCNAMARA JR. 1945 [id number]                 Emergency Contacts      (Rel.) Home Phone Work Phone Mobile Phone    mylene mcnamara (Son) [phone] -- [phone]    DmitriyLisa (Daughter) [phone] -- [phone]    Jose Mcnamara (Son) -- -- --               History & Physical      Olimpia Peña MD at 02/14/22 2320          H&P NOTE    Patient Identification:  Jesus Mcnamara Jr.  76 y.o.  male  1945  [id number]              CC: Altered mental status    History of Present Illness:  76-year-old " gentleman recently discharged around 3 weeks ago from the hospital after being admitted with acute hypoxemic hypercapnic respiratory failure with combined systolic and diastolic CHF.  Patient has known history of cardiomyopathy EF 30%.  Also has underlying history of A. fib on anticoagulation.  Patient now presented from home with altered mental status difficult to arouse.  He was noted to have sats in the 60s in the emergency room on admission and was immediately placed on noninvasive ventilation/BiPAP.  Currently on noninvasive ventilation and somewhat confused unable to give me any meaningful history.  There is some history of noncompliance with his medication and home CPAP.  Patient also tested positive for COVID-19 in the emergency room.  There is no history of fall or aspiration as such.  Unable to get any meaningful history from the patient due to confusion      Review of Systems  As above and limited with patient's current mental status and on noninvasive ventilation  Past Medical History:  Past Medical History:   Diagnosis Date   • Acute on chronic combined systolic and diastolic CHF (congestive heart failure) (HCC) 2022   • Asthma    • BPH (benign prostatic hypertrophy)    • Class 2 obesity in adult 2019   • Diabetes mellitus ([facility])    • Fatigue    • HLD (hyperlipidemia) 2016   • Hypertension    • Nephrolithiasis    • Stroke (HCC) 2015   • Tobacco abuse 3/24/2018   • Type 2 diabetes mellitus with diabetic polyneuropathy, without long-term current use of insulin ([facility])    • Venous insufficiency        Past Surgical History:  Past Surgical History:   Procedure Laterality Date   • LITHOTRIPSY      Renal        Home Meds:  (Not in a hospital admission)      Allergies:  No Known Allergies    Social History:   Social History     Socioeconomic History   • Marital status:    Tobacco Use   • Smoking status: Former Smoker     Packs/day: 1.00     Quit date:      Years since quittin.1   •  "Smokeless tobacco: Former User   • Tobacco comment: Cessation 12/2014; HX of 1 ppd for 52 yrs.   Vaping Use   • Vaping Use: Never used   Substance and Sexual Activity   • Alcohol use: No     Comment: caffeine use    • Drug use: No   • Sexual activity: Defer       Family History:  Family History   Problem Relation Age of Onset   • Arthritis Mother    • Cancer Mother    • Migraines Mother    • Hypertension Other        Physical Exam:  /95   Pulse 66   Resp (!) 32   Ht 180.3 cm (71\")   SpO2 97%   BMI 35.27 kg/m²  Body mass index is 35.27 kg/m². 97%    Physical Exam  Patient is examined using the personal protective equipment as per guidelines from infection control for this particular patient as enacted.  Hand hygiene was performed before and after patient interaction.  Elderly gentleman currently on noninvasive ventilation tolerating well  Confused elderly not following commands  Eyes normal conjunctivae and pupils reactive to light  ENT Mallampati between 3 and 4 normal nasal exam  Neck midline trachea no thyromegaly  Chest no labored breathing clear  CVS regular rate and rhythm 2+ lower extremity edema  Abdomen soft nontender no hepatosplenomegaly  CNS confused not following commands.  Moving all extremities  Skin no rashes no nodules  Psych confused not following commands  Musculoskeletal no cyanosis some venous stasis and chronic redness noted lower extremity        LABS:  Lab Results   Component Value Date    CALCIUM 9.1 02/14/2022    PHOS 2.6 01/24/2022     Results from last 7 days   Lab Units 02/14/22  2047   MAGNESIUM mg/dL 2.4   SODIUM mmol/L 137   POTASSIUM mmol/L 4.4   CHLORIDE mmol/L 101   CO2 mmol/L 27.3   BUN mg/dL 27*   CREATININE mg/dL 1.27   GLUCOSE mg/dL 270*   CALCIUM mg/dL 9.1   WBC 10*3/mm3 14.08*   HEMOGLOBIN g/dL 16.3   PLATELETS 10*3/mm3 282   ALT (SGPT) U/L 53*   AST (SGOT) U/L 44*   PROBNP pg/mL 11,950.0*   PROCALCITONIN ng/mL 0.06     Lab Results   Component Value Date    " TROPONINT <0.010 02/14/2022     Results from last 7 days   Lab Units 02/14/22 2047   TROPONIN T ng/mL <0.010         Results from last 7 days   Lab Units 02/14/22 2047   PROCALCITONIN ng/mL 0.06   LACTATE mmol/L 2.7*     Results from last 7 days   Lab Units 02/14/22 2112   PH, ARTERIAL pH units 7.196*   PCO2, ARTERIAL mm Hg 84.7*   PO2 ART mm Hg 77.0*   MODALITY  BiPap   O2 SATURATION CALC % 90.6*     Results from last 7 days   Lab Units 02/14/22 2048   ADENOVIRUS DETECTION BY PCR  Not Detected   CORONAVIRUS 229E  Not Detected   CORONAVIRUS HKU1  Not Detected   CORONAVIRUS NL63  Not Detected   CORONAVIRUS OC43  Not Detected   HUMAN METAPNEUMOVIRUS  Not Detected   HUMAN RHINOVIRUS/ENTEROVIRUS  Not Detected   INFLUENZA B PCR  Not Detected   PARAINFLUENZA 1  Not Detected   PARAINFLUENZA VIRUS 2  Not Detected   PARAINFLUENZA VIRUS 3  Not Detected   PARAINFLUENZA VIRUS 4  Not Detected   BORDETELLA PERTUSSIS PCR  Not Detected   BORDETELLA PARAPERTUSSIS PCR  Not Detected   CHLAMYDOPHILA PNEUMONIAE PCR  Not Detected   MYCOPLAMA PNEUMO PCR  Not Detected   RSV, PCR  Not Detected     Results from last 7 days   Lab Units 02/14/22 2047   INR  1.03         Lab Results   Component Value Date    TSH 1.590 02/14/2022     Estimated Creatinine Clearance: 63.8 mL/min (by C-G formula based on SCr of 1.27 mg/dL).         Imaging: I personally visualized the images of scans/x-rays performed within last 3 days.      Assessment:  Acute hypoxemic hypercapnic respiratory failure on noninvasive ventilation  Acute metabolic encephalopathy  Acute on chronic systolic heart failure  Positive for COVID-19 infection  Lactic acidosis  Acute cardiogenic pulmonary edema  Paroxysmal A. fib on anticoagulation  Possible cellulitis with sepsis  Cardiomyopathy EF 30%  Suspected COPD  YEISON on CPAP  Morbid obesity        Recommendations:  At this time we have a elderly gentleman with multiple medical problems and issues as listed above.  I suspect his  primary issue is congestive heart failure with sats significantly elevated proBNP.  I would go ahead initiate diuretics Lasix 40 mg IV twice daily and monitor urine output  Altered mental status with current respiratory failure and Covid infection with possible sepsis.  If no improvement consider neurology evaluation  Continue noninvasive ventilation currently tolerating well.  Repeat blood gases in the morning.  Positive for COVID-19 infection chest x-ray is not that impressive.  Since patient is significantly hypoxic I will initiate Decadron and remdesivir also.  Trend lactate  Initiate broad-spectrum antibiotic with concerns of cellulitis with lactic acidosis.  ICU core measures  Bronchodilators  Monitor clinical course closely.    Critical care time 40 minutes          Olimpia Peña MD  2/14/2022  23:20 EST      Much of this encounter note is an electronic transcription/translation of spoken language to printed text using Dragon Software.    Electronically signed by Olimpia Peña MD at 02/15/22 0540          Emergency Department Notes      Suyapa Hernandez, RN at 02/14/22 2020        Pt to triage from home with lethargy and increased confusion per son.      Suyapa Hernandez RN  02/14/22 2022      Electronically signed by Suyapa Hernandez RN at 02/14/22 2022     Alo Snyder MD at 02/14/22 2025      Procedure Orders    1. Critical Care [[id number]] ordered by Alo Snyder MD                EMERGENCY DEPARTMENT ENCOUNTER    Room Number:  38/38  Date of encounter:  2/15/2022  PCP: Jason Joya APRN  Historian: Son      HPI:  Chief Complaint: Increased confusion    A complete HPI/ROS/PMH/PSH/SH/FH are unobtainable due to: Confusion    Context: Jesus Izaguirre Jr. is a 76 y.o. male who presents to the ED from home via car per patient sign he states the patient is more lethargic and difficult to arouse today.  Patient's oxygen saturations in triage were 64% and he was brought directly back to a  room.  The patient is lethargic but arousable but however is unable to give any further history.  Of note is that the care of this patient approximately a month ago when he presented in acute hypoxic and hypercapnic respiratory failure requiring BiPAP and ICU admission.        PAST MEDICAL HISTORY  Active Ambulatory Problems     Diagnosis Date Noted   • Chronic ischemic right MCA stroke 04/29/2016   • BPH (benign prostatic hyperplasia) 07/05/2016   • Gout 07/05/2016   • Hyperlipidemia 07/05/2016   • Hypertension 07/05/2016   • YEISON (obstructive sleep apnea) 07/05/2016   • Type 2 diabetes mellitus with diabetic polyneuropathy, without long-term current use of insulin ([facility]) 07/05/2016   • Vitamin B12 deficiency 10/31/2016   • Vitamin D deficiency 10/31/2016   • History of lithotripsy 03/22/2012   • Onychia of finger 12/09/2012   • Venous stasis 03/17/2017   • Memory impairment 05/01/2017   • Neuropathy 09/21/2017   • Paroxysmal atrial fibrillation ([facility]) 03/24/2018   • Tobacco abuse 03/24/2018   • Obesity (BMI 30-39.9) 04/07/2019   • Colon cancer screening 04/12/2021   • Cellulitis of abdominal wall 11/04/2021   • Acute respiratory failure with hypoxia and hypercapnia ([facility]) 01/16/2022   • Metabolic encephalopathy 01/20/2022   • Acute on chronic combined systolic and diastolic CHF (congestive heart failure) ([facility]) 01/20/2022   • Former smoker 01/20/2022   • IKE (acute kidney injury) ([facility]) 01/20/2022   • NICM (nonischemic cardiomyopathy) ([facility]) 01/20/2022   • History of cardioembolic cerebrovascular accident (CVA) 01/20/2022   • PVD (peripheral vascular disease) ([facility]) 01/20/2022     Resolved Ambulatory Problems     Diagnosis Date Noted   • Acute bronchitis 07/05/2016   • AB (asthmatic bronchitis) 07/05/2016   • Bradycardia 07/05/2016   • Cough 07/05/2016   • Dribbling from mouth 07/05/2016   • Fatigue 07/05/2016   • Febrile 07/05/2016   • Asian flu 07/05/2016   • Amnesia 07/05/2016   • Breath shortness 07/05/2016   •  Calculus of kidney 2017   • Paronychia of finger 2012   • Acute pain of right knee 2017     Past Medical History:   Diagnosis Date   • Asthma    • BPH (benign prostatic hypertrophy)    • Class 2 obesity in adult 2019   • Diabetes mellitus (HCC)    • HLD (hyperlipidemia) 2016   • Nephrolithiasis    • Stroke (HCC) 2015   • Venous insufficiency          PAST SURGICAL HISTORY  Past Surgical History:   Procedure Laterality Date   • LITHOTRIPSY      Renal         FAMILY HISTORY  Family History   Problem Relation Age of Onset   • Arthritis Mother    • Cancer Mother    • Migraines Mother    • Hypertension Other          SOCIAL HISTORY  Social History     Socioeconomic History   • Marital status:    Tobacco Use   • Smoking status: Former Smoker     Packs/day: 1.00     Quit date:      Years since quittin.1   • Smokeless tobacco: Former User   • Tobacco comment: Cessation 2014; HX of 1 ppd for 52 yrs.   Vaping Use   • Vaping Use: Never used   Substance and Sexual Activity   • Alcohol use: No     Comment: caffeine use    • Drug use: No   • Sexual activity: Defer         ALLERGIES  Patient has no known allergies.        REVIEW OF SYSTEMS  Review of Systems     Unable due to altered mental status  All systems reviewed and negative except for those discussed in HPI.     PHYSICAL EXAM    I have reviewed the triage vital signs and nursing notes.    ED Triage Vitals   Temp Pulse Resp BP SpO2   -- -- -- -- --      Temp src Heart Rate Source Patient Position BP Location FiO2 (%)   -- -- -- -- --       GENERAL: 76-year-old well developed, well nourished in moderate respiratory distress  HENT: NCAT, neck supple, trachea midline  EYES: no scleral icterus, PERRL, normal conjunctivae  CV: regular rhythm, regular rate, no murmur  RESPIRATORY: Decreased respiratory effort with wheezes in bases bilaterally  ABDOMEN: soft, nontender, nondistended, bowel sounds present  MUSCULOSKELETAL: no gross  deformity, 3+ pedal edema, no calf tenderness: Chronic venous changes  NEURO: Lethargic but arousable and moves all 4 extremities equally to command  SKIN: warm, dry, no rash        PPE  Pt does not present with symptoms for COVID19; however, I was wearing a N95 mask and goggles throughout all patient interaction.    Vital signs and nursing notes reviewed.      LAB RESULTS  Recent Results (from the past 24 hour(s))   POC Glucose Once    Collection Time: 02/14/22  8:27 PM    Specimen: Blood   Result Value Ref Range    Glucose 234 (H) 70 - 130 mg/dL   ECG 12 Lead    Collection Time: 02/14/22  8:32 PM   Result Value Ref Range    QT Interval 484 ms   Comprehensive Metabolic Panel    Collection Time: 02/14/22  8:47 PM    Specimen: Blood   Result Value Ref Range    Glucose 270 (H) 65 - 99 mg/dL    BUN 27 (H) 8 - 23 mg/dL    Creatinine 1.27 0.76 - 1.27 mg/dL    Sodium 137 136 - 145 mmol/L    Potassium 4.4 3.5 - 5.2 mmol/L    Chloride 101 98 - 107 mmol/L    CO2 27.3 22.0 - 29.0 mmol/L    Calcium 9.1 8.6 - 10.5 mg/dL    Total Protein 7.5 6.0 - 8.5 g/dL    Albumin 4.10 3.50 - 5.20 g/dL    ALT (SGPT) 53 (H) 1 - 41 U/L    AST (SGOT) 44 (H) 1 - 40 U/L    Alkaline Phosphatase 152 (H) 39 - 117 U/L    Total Bilirubin 0.3 0.0 - 1.2 mg/dL    eGFR Non African Amer 55 (L) >60 mL/min/1.73    Globulin 3.4 gm/dL    A/G Ratio 1.2 g/dL    BUN/Creatinine Ratio 21.3 7.0 - 25.0    Anion Gap 8.7 5.0 - 15.0 mmol/L   BNP    Collection Time: 02/14/22  8:47 PM    Specimen: Blood   Result Value Ref Range    proBNP 11,950.0 (H) 0.0-1,800.0 pg/mL   Troponin    Collection Time: 02/14/22  8:47 PM    Specimen: Blood   Result Value Ref Range    Troponin T <0.010 0.000 - 0.030 ng/mL   Green Top (Gel)    Collection Time: 02/14/22  8:47 PM   Result Value Ref Range    Extra Tube Hold for add-ons.    Lavender Top    Collection Time: 02/14/22  8:47 PM   Result Value Ref Range    Extra Tube hold for add-on    Gold Top - SST    Collection Time: 02/14/22  8:47 PM    Result Value Ref Range    Extra Tube Hold for add-ons.    Light Blue Top    Collection Time: 02/14/22  8:47 PM   Result Value Ref Range    Extra Tube hold for add-on    CBC Auto Differential    Collection Time: 02/14/22  8:47 PM    Specimen: Blood   Result Value Ref Range    WBC 14.08 (H) 3.40 - 10.80 10*3/mm3    RBC 5.75 4.14 - 5.80 10*6/mm3    Hemoglobin 16.3 13.0 - 17.7 g/dL    Hematocrit 52.3 (H) 37.5 - 51.0 %    MCV 91.0 79.0 - 97.0 fL    MCH 28.3 26.6 - 33.0 pg    MCHC 31.2 (L) 31.5 - 35.7 g/dL    RDW 13.8 12.3 - 15.4 %    RDW-SD 45.8 37.0 - 54.0 fl    MPV 11.3 6.0 - 12.0 fL    Platelets 282 140 - 450 10*3/mm3    Neutrophil % 81.3 (H) 42.7 - 76.0 %    Lymphocyte % 10.1 (L) 19.6 - 45.3 %    Monocyte % 7.2 5.0 - 12.0 %    Eosinophil % 0.4 0.3 - 6.2 %    Basophil % 0.4 0.0 - 1.5 %    Immature Grans % 0.6 (H) 0.0 - 0.5 %    Neutrophils, Absolute 11.44 (H) 1.70 - 7.00 10*3/mm3    Lymphocytes, Absolute 1.42 0.70 - 3.10 10*3/mm3    Monocytes, Absolute 1.02 (H) 0.10 - 0.90 10*3/mm3    Eosinophils, Absolute 0.06 0.00 - 0.40 10*3/mm3    Basophils, Absolute 0.05 0.00 - 0.20 10*3/mm3    Immature Grans, Absolute 0.09 (H) 0.00 - 0.05 10*3/mm3    nRBC 0.1 0.0 - 0.2 /100 WBC   Protime-INR    Collection Time: 02/14/22  8:47 PM    Specimen: Blood   Result Value Ref Range    Protime 13.4 11.7 - 14.2 Seconds    INR 1.03 0.90 - 1.10   Lactic Acid, Plasma    Collection Time: 02/14/22  8:47 PM    Specimen: Blood   Result Value Ref Range    Lactate 2.7 (C) 0.5 - 2.0 mmol/L   Procalcitonin    Collection Time: 02/14/22  8:47 PM    Specimen: Blood   Result Value Ref Range    Procalcitonin 0.06 0.00 - 0.25 ng/mL   Magnesium    Collection Time: 02/14/22  8:47 PM    Specimen: Blood   Result Value Ref Range    Magnesium 2.4 1.6 - 2.4 mg/dL   Ethanol    Collection Time: 02/14/22  8:47 PM    Specimen: Blood   Result Value Ref Range    Ethanol <10 0 - 10 mg/dL    Ethanol % <0.010 %   TSH    Collection Time: 02/14/22  8:47 PM    Specimen:  Blood   Result Value Ref Range    TSH 1.590 0.270 - 4.200 uIU/mL   T4, Free    Collection Time: 02/14/22  8:47 PM    Specimen: Blood   Result Value Ref Range    Free T4 0.95 0.93 - 1.70 ng/dL   Respiratory Panel PCR w/COVID-19(SARS-CoV-2) FRANCISCO/TASHA/JOSHUA/PAD/COR/MAD/BECKA In-House, NP Swab in UTM/VTM, 3-4 HR TAT - Swab, Nasopharynx    Collection Time: 02/14/22  8:48 PM    Specimen: Nasopharynx; Swab   Result Value Ref Range    ADENOVIRUS, PCR Not Detected Not Detected    Coronavirus 229E Not Detected Not Detected    Coronavirus HKU1 Not Detected Not Detected    Coronavirus NL63 Not Detected Not Detected    Coronavirus OC43 Not Detected Not Detected    COVID19 Detected (C) Not Detected - Ref. Range    Human Metapneumovirus Not Detected Not Detected    Human Rhinovirus/Enterovirus Not Detected Not Detected    Influenza A PCR Not Detected Not Detected    Influenza B PCR Not Detected Not Detected    Parainfluenza Virus 1 Not Detected Not Detected    Parainfluenza Virus 2 Not Detected Not Detected    Parainfluenza Virus 3 Not Detected Not Detected    Parainfluenza Virus 4 Not Detected Not Detected    RSV, PCR Not Detected Not Detected    Bordetella pertussis pcr Not Detected Not Detected    Bordetella parapertussis PCR Not Detected Not Detected    Chlamydophila pneumoniae PCR Not Detected Not Detected    Mycoplasma pneumo by PCR Not Detected Not Detected   Blood Gas, Arterial -    Collection Time: 02/14/22  9:12 PM    Specimen: Arterial Blood   Result Value Ref Range    Site Arterial: right radial     Bro's Test Positive     pH, Arterial 7.196 (C) 7.350 - 7.450 pH units    pCO2, Arterial 84.7 (C) 35.0 - 45.0 mm Hg    pO2, Arterial 77.0 (L) 80.0 - 100.0 mm Hg    HCO3, Arterial 32.8 (H) 22.0 - 28.0 mmol/L    Base Excess, Arterial 1.2 0.0 - 2.0 mmol/L    O2 Saturation Calculated 90.6 (L) 92.0 - 99.0 %    A-a Gradiant 0.3 mmHg    Barometric Pressure for Blood Gas 761.6 mmHg    Modality BiPap     FIO2 50 %    Set Tidal Volume 454      Set [facility] Resp Rate 20     Rate 25 Breaths/minute       Ordered the above labs and independently reviewed the results.        RADIOLOGY  XR Chest 1 View    Result Date: 2/14/2022  PORTABLE CHEST 02/14/2022 AT 8:39 PM  CLINICAL HISTORY: Dyspnea  Compared to the previous chest dated 01/19/2022.  The lungs are fairly well-expanded and appear free of infiltrates. There are no pleural effusions. The heart is moderately enlarged and unchanged. The pulmonary vasculature is within normal limits.  IMPRESSIONS: Moderate cardiomegaly. No evidence of acute disease within the chest.  This report was finalized on 2/14/2022 9:22 PM by Dr. Rod Lawson M.D.        I ordered the above noted radiological studies. Independently reviewed by me and discussed with radiologist.  See dictation above for official radiology interpretation.      PROCEDURES    Critical Care  Performed by: Alo Snyder MD  Authorized by: Alo Snyder MD     Critical care provider statement:     Critical care time (minutes):  40    Critical care time was exclusive of:  Separately billable procedures and treating other patients    Critical care was necessary to treat or prevent imminent or life-threatening deterioration of the following conditions:  CNS failure or compromise and respiratory failure    Critical care was time spent personally by me on the following activities:  Development of treatment plan with patient or surrogate, discussions with consultants, discussions with primary provider, evaluation of patient's response to treatment, examination of patient, obtaining history from patient or surrogate, ordering and performing treatments and interventions, ordering and review of laboratory studies, ordering and review of radiographic studies, pulse oximetry, re-evaluation of patient's condition and review of old charts    I assumed direction of critical care for this patient from another provider in my specialty: no          EKG    EKG time:  2032  Rhythm/Rate: Sinus rhythm at 72 with frequent PVCs and PACs  Right bundle branch block  No Acute Ischemia  Non-Specific ST-T changes    Unchanged compared to prior on 1/19/2022    Interpreted Contemporaneously by me.  Independently viewed by me      MEDICATIONS GIVEN IN ER    Medications   sodium chloride 0.9 % flush 10 mL (has no administration in time range)   sodium chloride 0.9 % flush 10 mL (has no administration in time range)   furosemide (LASIX) injection 40 mg (0 mg Intravenous Hold 2/15/22 0000)   piperacillin-tazobactam (ZOSYN) 3.375 g in iso-osmotic dextrose 50 ml (premix) (0 g Intravenous Hold 2/15/22 0000)   piperacillin-tazobactam (ZOSYN) 3.375 g in iso-osmotic dextrose 50 ml (premix) (has no administration in time range)   Pharmacy to dose vancomycin (has no administration in time range)   Pharmacy Consult - Remdesivir (has no administration in time range)   remdesivir 200 mg in sodium chloride 0.9 % 290 mL IVPB (powder vial) (has no administration in time range)     Followed by   remdesivir 100 mg in sodium chloride 0.9 % 250 mL IVPB (powder vial) (has no administration in time range)   dexamethasone (DECADRON) injection 6 mg (has no administration in time range)   dextrose (GLUTOSE) oral gel 15 g (has no administration in time range)   dextrose (D50W) (25 g/50 mL) IV injection 25 g (has no administration in time range)   glucagon (human recombinant) (GLUCAGEN DIAGNOSTIC) injection 1 mg (has no administration in time range)   sodium chloride 0.9 % flush 10 mL (10 mL Intravenous Given 2/14/22 2350)   sodium chloride 0.9 % flush 10 mL (has no administration in time range)   insulin regular (humuLIN R,novoLIN R) injection 0-14 Units (has no administration in time range)   methylPREDNISolone sodium succinate (SOLU-Medrol) injection 125 mg (125 mg Intravenous Given 2/14/22 2313)   furosemide (LASIX) injection 80 mg (80 mg Intravenous Given 2/14/22 2313)   piperacillin-tazobactam (ZOSYN) 3.375 g in  iso-osmotic dextrose 50 ml (premix) (3.375 g Intravenous New Bag 2/14/22 2313)   vancomycin 2250 mg/500 mL 0.9% NS IVPB ([facility]) (2,250 mg Intravenous New Bag 2/14/22 2313)         PROGRESS, DATA ANALYSIS, CONSULTS, AND MEDICAL DECISION MAKING    All labs have been independently reviewed by me.  All radiology studies have been reviewed by me and discussed with radiologist dictating report.   EKG's independently reviewed by me.  Discussion below represents my analysis of pertinent findings related to patient's condition, differential diagnosis, treatment plan and final disposition.    Upon review of the patient's medical record it shows that he was admitted from the hospital from January 16 through January 24 with acute respiratory failure with hypoxia and hypercapnia secondary acute CHF and hypertensive emergency    ED Course as of 02/15/22 0002   Mon Feb 14, 2022   2037 On initial evaluation patient is markedly hypoxic with saturations in the 60s.  We have placed him on a nonrebreather and his oxygen saturations have improved to the 90s and he is more responsive at this point.  Due to his history of hypercapnic and hypoxic respiratory failure 1 month ago I will go ahead and initiate BiPAP while obtaining labs, ABG, EKG, chest x-ray and treat him with Solu-Medrol while awaiting his work-up.  Of note is the patient's blood sugar is 234. [GP]   2056 After being placed on BiPAP the patient is breathing much more comfortably with oxygen saturations of 95%. [GP]   2116 The patient's ABG after approximately 30 minutes on BiPAP still shows him to be in acute hypercapnic respiratory failure.  We have increased his pressure and rate at this time. [GP]   2128 The patient's BNP is elevated 11,000 950 and thus I will give him a dose of Lasix.  He does have an elevated lactic acid as well and thus I will cover him with antibiotics but will hold on a sepsis fluid bolus because I feel he is fluid overloaded. [GP]   2249 Patient is  Covid positive. [GP]   6172 On repeat examination the patient is tolerating the BiPAP well and is much more alert.  I advised that he had Covid and will need to be admitted to the hospital again.  Patient understands and agrees with plan. [GP]   2172 I just discussed the patient at length with his son.  The son states the patient was doing well while he was in rehab but for the past 4 days he has been back home and it does not appear he is been taking his medications or using his CPAP.  I advised the son the patient is critically ill again and will need to go to the ICU tonight.  The son understands. [GP]   2807 I discussed the case with Dr. Peña from pulmonary.  He is aware the patient is in acute respiratory failure and on BiPAP and has Covid.  He will admit the patient to the intensive care unit. [GP]      ED Course User Index  [GP] Alo Snyder MD           The differential diagnosis includes but is not limited to pneumonia, congestive heart failure, pulmonary embolism, pleural effusion, acute coronary syndrome, chronic obstructive pulmonary disease exacerbation, or pneumothorax.        AS OF 00:02 EST VITALS:    BP - 108/78  HR - 68  TEMP -    02 SATS - (!) 87%        DIAGNOSIS  Final diagnoses:   Acute respiratory failure with hypoxia and hypercapnia (HCC)   Altered mental status, unspecified altered mental status type   COVID-19   Congestive heart failure, unspecified HF chronicity, unspecified heart failure type (HCC)   Sepsis, due to unspecified organism, unspecified whether acute organ dysfunction present (HCC)         DISPOSITION  ADMISSION    Discussed treatment plan and reason for admission with pt/family and admitting physician.  Pt/family voiced understanding of the plan for admission for further testing/treatment as needed.        EMR Dragon/Transcription disclaimer:   Much of this encounter note is an electronic transcription/translation of spoken language to printed text.        Claudio  MD Alo  02/15/22 0002      Electronically signed by Alo Snyder MD at 02/15/22 0002            Vital Signs (last 3 days)     Date/Time Temp Temp src Pulse Resp BP Patient Position SpO2    02/16/22 0600 -- -- 57 -- 97/59 -- 94    02/16/22 0500 -- -- 50 -- 141/75 -- 98    02/16/22 0445 -- -- 53 -- -- -- 94    02/16/22 0430 -- -- 45 -- 125/69 -- 95    02/16/22 0415 -- -- 52 -- -- -- 97    02/16/22 0400 -- -- 51 -- -- -- 97    02/16/22 0345 -- -- 49 -- -- -- 95    02/16/22 0330 -- -- 43 -- 111/74 -- 84    02/16/22 0315 -- -- 48 -- -- -- 93    02/16/22 0300 -- -- 44 -- 118/57 -- 98    02/16/22 0245 -- -- 44 -- -- -- 100    02/16/22 0230 -- -- 49 -- 104/56 -- 99    02/16/22 0215 -- -- 50 -- -- -- 93    02/16/22 0200 -- -- 48 -- 99/55 -- 91    02/16/22 0145 -- -- 56 -- -- -- --    02/16/22 0130 -- -- 55 -- -- -- 87    02/16/22 0115 -- -- 57 -- -- -- 98    02/16/22 0100 -- -- 53 -- 108/72 -- 96    02/16/22 0045 -- -- 50 -- 112/67 -- 95    02/16/22 0030 -- -- 45 -- 117/70 -- 90    02/16/22 0015 -- -- 54 -- -- -- 94    02/16/22 0000 -- -- 56 -- 140/79 -- 97    02/15/22 2345 -- -- 52 -- 145/89 -- 98    02/15/22 2330 -- -- 50 -- 92/75 -- 96    02/15/22 2315 -- -- 55 -- 148/77 -- 99    02/15/22 2300 97.3 (36.3) Oral 57 -- 120/75 -- 94    02/15/22 2245 -- -- 44 -- 118/61 -- 97    02/15/22 2230 -- -- 55 -- 99/50 -- 93    02/15/22 2215 -- -- 52 -- 117/72 -- 99    02/15/22 2200 -- -- 56 -- 133/78 -- 95    02/15/22 2145 -- -- 58 -- 132/84 -- 95    02/15/22 2130 -- -- 62 -- -- -- 89    02/15/22 2115 -- -- 59 -- 145/80 -- 97    02/15/22 2100 -- -- 54 -- 134/80 -- 98    02/15/22 2045 -- -- 59 -- 131/94 -- 98    02/15/22 2015 -- -- 55 -- -- -- 97    02/15/22 2000 96.7 (35.9) Oral 60 -- 94/69 -- 90    02/1945 -- -- 56 -- 90/67 -- 93    02/15/22 1900 -- -- 57 -- 141/69 -- 92    02/15/22 1815 -- -- 64 -- 140/83 -- 92    02/15/22 1700 -- -- 64 -- 110/73 -- 94    02/15/22 1608 97.8 (36.6) Oral -- -- -- -- --    02/15/22 1600  -- -- 58 -- 141/75 -- 95    02/15/22 1500 -- -- 61 -- 132/57 -- 96    02/15/22 1400 -- -- 56 -- 126/116 -- 92    02/15/22 1315 -- -- 55 -- 146/77 -- 90    02/15/22 1219 97.5 (36.4) Oral -- -- -- -- --    02/15/22 1200 -- -- 55 -- 131/74 -- 92    02/15/22 1118 -- -- -- -- -- -- 92    02/15/22 1100 -- -- 70 -- 100/89 -- 93    02/15/22 1000 -- -- 48 -- 126/114 -- 93    02/15/22 0900 -- -- 50 -- 136/84 -- 94    02/15/22 0826 -- -- 43 24 -- -- 96    02/15/22 0800 -- -- 43 -- 124/70 -- 95    02/15/22 0700 -- -- 53 -- 99/68 -- 92    02/15/22 0645 -- -- 52 -- -- -- 94    02/15/22 0630 -- -- 46 -- 105/66 -- 94    02/15/22 0615 -- -- 43 -- 100/68 -- 93    02/15/22 0600 -- -- 50 -- 104/58 -- 93    02/15/22 0545 -- -- 53 -- 121/85 -- 96    02/15/22 0530 -- -- 54 -- 120/67 -- 92    02/15/22 0515 -- -- 48 -- 112/75 -- 89    02/15/22 0500 -- -- 55 -- 119/74 -- 94    02/15/22 0445 -- -- 46 -- 100/53 -- 91    02/15/22 0430 -- -- 53 -- 101/57 -- 92    02/15/22 0415 -- -- 46 -- 92/55 -- 92    02/15/22 0400 -- -- 54 -- 90/52 -- 92    02/15/22 0345 -- -- 48 -- 98/80 -- 94    02/15/22 0330 -- -- 46 -- 101/57 -- 93    02/15/22 0315 -- -- 48 -- 98/66 -- 90    02/15/22 0307 -- -- 52 24 -- -- 94    02/15/22 0300 -- -- 50 -- 101/70 -- 91    02/15/22 0245 -- -- 61 -- 114/59 -- 93    02/15/22 0230 -- -- 62 -- 104/84 -- 88    02/15/22 0215 -- -- 62 -- 109/72 -- 89    02/15/22 0204 -- -- 62 -- 107/63 -- 91    02/15/22 0200 -- -- 64 -- 89/64 -- 91    02/15/22 0145 -- -- 67 -- 118/65 -- 91    02/15/22 0130 -- -- 67 -- 104/82 -- 90    02/15/22 0115 -- -- 86 -- -- -- 86    02/15/22 0100 -- -- 78 -- 131/92 -- 92    02/15/22 0045 -- -- 80 -- -- -- 93    02/15/22 0038 96.5 (35.8) Axillary 71 -- -- Lying 97    02/14/22 2331 -- -- 68 -- 108/78 -- 87    02/14/22 2240 -- -- 66 -- -- -- 97    02/14/22 2118 -- -- 52 -- -- -- 95    02/14/22 2042 -- -- 80 32 128/95 -- 66    02/14/22 2040 -- -- 73 -- -- -- 99          Oxygen Therapy (last 3 days)     Date/Time  SpO2 Device (Oxygen Therapy) Flow (L/min) Oxygen Concentration (%) ETCO2 (mmHg)    02/16/22 0600 94 -- -- -- --    02/16/22 0500 98 -- -- -- --    02/16/22 0445 94 -- -- -- --    02/16/22 0430 95 -- -- -- --    02/16/22 0415 97 -- -- -- --    02/16/22 0400 97 nasal cannula 6 -- --    02/16/22 0345 95 -- -- -- --    02/16/22 0330 84 -- -- -- --    02/16/22 0315 93 -- -- -- --    02/16/22 0300 98 -- -- -- --    02/16/22 0245 100 -- -- -- --    02/16/22 0230 99 -- -- -- --    02/16/22 0215 93 -- -- -- --    02/16/22 0200 91 -- -- -- --    02/16/22 0130 87 -- -- -- --    02/16/22 0115 98 -- -- -- --    02/16/22 0100 96 -- -- -- --    02/16/22 0045 95 -- -- -- --    02/16/22 0030 90 -- -- -- --    02/16/22 0015 94 -- -- -- --    02/16/22 0000 97 nasal cannula 6 -- --    02/15/22 2345 98 -- -- -- --    02/15/22 2330 96 -- -- -- --    02/15/22 2315 99 -- -- -- --    02/15/22 2300 94 -- -- -- --    02/15/22 2245 97 -- -- -- --    02/15/22 2230 93 -- -- -- --    02/15/22 2215 99 -- -- -- --    02/15/22 2200 95 -- -- -- --    02/15/22 2145 95 -- -- -- --    02/15/22 2130 89 -- -- -- --    02/15/22 2115 97 -- -- -- --    02/15/22 2100 98 -- -- -- --    02/15/22 2045 98 -- -- -- --    02/15/22 2015 97 -- -- -- --    02/15/22 2000 90 nasal cannula 6 -- --    02/1945 93 -- -- -- --    02/15/22 1900 92 -- -- -- --    02/15/22 1815 92 -- -- -- --    02/15/22 1700 94 -- -- -- --    02/15/22 1600 95 -- -- -- --    02/15/22 1500 96 -- -- -- --    02/15/22 1400 92 -- -- -- --    02/15/22 1315 90 -- -- -- --    02/15/22 1200 92 -- -- -- --    02/15/22 1118 92 nasal cannula 6 -- --    02/15/22 1100 93 -- -- -- --    02/15/22 1000 93 -- -- -- --    02/15/22 0900 94 -- -- -- --    02/15/22 0826 96 NPPV/NIV -- 50 --    02/15/22 0800 95 nasal cannula 6 -- --    02/15/22 0700 92 -- -- -- --    02/15/22 0645 94 -- -- -- --    02/15/22 0630 94 -- -- -- --    02/15/22 0615 93 -- -- -- --    02/15/22 0600 93 -- -- -- --    02/15/22 0545 96  -- -- -- --    02/15/22 0530 92 -- -- -- --    02/15/22 0515 89 -- -- -- --    02/15/22 0500 94 -- -- -- --    02/15/22 0445 91 -- -- -- --    02/15/22 0430 92 -- -- -- --    02/15/22 0415 92 -- -- -- --    02/15/22 0400 92 -- -- -- --    02/15/22 0350 -- NPPV/NIV -- 50 --    02/15/22 0345 94 -- -- -- --    02/15/22 0330 93 -- -- -- --    02/15/22 0315 90 -- -- -- --    02/15/22 0307 94 NPPV/NIV -- 50 --    02/15/22 0300 91 -- -- -- --    02/15/22 0245 93 -- -- -- --    02/15/22 0230 88 -- -- -- --    02/15/22 0215 89 -- -- -- --    02/15/22 0204 91 -- -- -- --    02/15/22 0200 91 -- -- -- --    02/15/22 0145 91 -- -- -- --    02/15/22 0130 90 -- -- -- --    02/15/22 0115 86 -- -- -- --    02/15/22 0100 92 -- -- -- --    02/15/22 0045 93 NPPV/NIV -- 50 --    02/15/22 0038 97 NPPV/NIV -- 50 --    02/14/22 2331 87 -- -- -- --    02/14/22 2240 97 NPPV/NIV -- 50 --    02/14/22 2118 95 NPPV/NIV -- 50 --    02/14/22 2042 66 room air -- -- --    02/14/22 2040 99 NPPV/NIV -- 50 --        Lines, Drains & Airways     Active LDAs     Name Placement date Placement time Site Days    Peripheral IV 02/14/22 2049 Left Antecubital 02/14/22 2049  Antecubital  1    Peripheral IV 02/15/22 2000 Distal; Left; Posterior Forearm 02/15/22  2000  Forearm  less than 1    External Urinary Catheter 02/15/22  0042  --  1          Inactive LDAs     Name Placement date Placement time Removal date Removal time Site Days    [REMOVED] Peripheral IV 02/14/22 2048 Right Antecubital 02/14/22 2048 02/16/22 0400  Antecubital  1                  Facility-Administered Medications as of 2/16/2022   Medication Dose Route Frequency Provider Last Rate Last Admin   • apixaban (ELIQUIS) tablet 5 mg  5 mg Oral Once Olimpia Peña MD       • dexamethasone (DECADRON) injection 6 mg  6 mg Intravenous Daily Olimpia Peña MD   6 mg at 02/15/22 0846   • dextrose (D50W) (25 g/50 mL) IV injection 25 g  25 g Intravenous Q15 Min PRN Olimpia Peña MD       •  dextrose (GLUTOSE) oral gel 15 g  15 g Oral Q15 Min PRN Olimpia Peña MD       • [COMPLETED] furosemide (LASIX) injection 80 mg  80 mg Intravenous Once Alo Snyder MD   80 mg at 02/14/22 2313   • glucagon (human recombinant) (GLUCAGEN DIAGNOSTIC) injection 1 mg  1 mg Intramuscular Q15 Min PRN Olimpia Peña MD       • insulin regular (humuLIN R,novoLIN R) injection 0-14 Units  0-14 Units Subcutaneous Q6H Olimpia Peña MD   3 Units at 02/16/22 0519   • [COMPLETED] iopamidol (ISOVUE-370) 76 % injection 100 mL  100 mL Intravenous Once in imaging Olimpia Peña MD   100 mL at 02/15/22 1419   • ipratropium-albuterol (DUO-NEB) nebulizer solution 3 mL  3 mL Nebulization Q6H PRN Olimpia Peña MD       • [COMPLETED] methylPREDNISolone sodium succinate (SOLU-Medrol) injection 125 mg  125 mg Intravenous Once Alo Snyder MD   125 mg at 02/14/22 2313   • Pharmacy Consult - Remdesivir   Does not apply Continuous PRN Olimpia Peña MD       • Pharmacy to dose vancomycin   Does not apply Continuous PRN Olimpia Peña MD       • [COMPLETED] piperacillin-tazobactam (ZOSYN) 3.375 g in iso-osmotic dextrose 50 ml (premix)  3.375 g Intravenous Once Alo Snyder MD   3.375 g at 02/14/22 2313   • piperacillin-tazobactam (ZOSYN) 3.375 g in iso-osmotic dextrose 50 ml (premix)  3.375 g Intravenous Q8H Olimpia Peña MD   3.375 g at 02/16/22 0412   • [COMPLETED] remdesivir 200 mg in sodium chloride 0.9 % 290 mL IVPB (powder vial)  200 mg Intravenous Q24H Olimpia Peña MD   200 mg at 02/15/22 0238    Followed by   • remdesivir 100 mg in sodium chloride 0.9 % 250 mL IVPB (powder vial)  100 mg Intravenous Q24H Olimpia Peña MD   100 mg at 02/16/22 0210   • sodium chloride 0.9 % flush 10 mL  10 mL Intravenous PRN Olimpia Peña MD       • sodium chloride 0.9 % flush 10 mL  10 mL Intravenous PRN Olimpia Peña MD       • sodium chloride 0.9 % flush 10 mL  10 mL Intravenous Q12H Olimpia Peña MD   10 mL at 02/15/22  "2028   • sodium chloride 0.9 % flush 10 mL  10 mL Intravenous PRN Olimpia Peña MD       • vancomycin 1500 mg/500 mL 0.9% NS IVPB ([facility])  1,500 mg Intravenous Q24H Travis Giang MD   1,500 mg at 02/15/22 2201   • [COMPLETED] vancomycin 2250 mg/500 mL 0.9% NS IVPB ([facility])  20 mg/kg Intravenous Once Alo Snyder MD   2,250 mg at 02/14/22 2313     Orders (last 72 hrs)      Start     Ordered    02/16/22 0600  C-reactive Protein  Daily       02/15/22 1413    02/16/22 0600  Renal Function Panel  Daily       02/15/22 1413    02/16/22 0600  CBC (No Diff)  Daily       02/15/22 1413    02/16/22 0600  Phosphorus  PROCEDURE ONCE         02/15/22 2204    02/16/22 0600  Basic Metabolic Panel  PROCEDURE ONCE         02/15/22 2204    02/16/22 0515  POC Glucose Once  PROCEDURE ONCE         02/16/22 0513    02/15/22 2332  remdesivir 100 mg in sodium chloride 0.9 % 250 mL IVPB (powder vial)  Every 24 Hours        \"Followed by\" Linked Group Details    02/14/22 2330    02/15/22 2301  POC Glucose Once  PROCEDURE ONCE         02/15/22 2259    02/15/22 2300  vancomycin 1500 mg/500 mL 0.9% NS IVPB ([facility])  Every 24 Hours         02/15/22 1043    02/15/22 1836  POC Glucose Once  PROCEDURE ONCE         02/15/22 1833    02/15/22 1708  Blood Culture ID, PCR - Blood, Arm, Left  Once         02/15/22 1707    02/15/22 1515  iopamidol (ISOVUE-370) 76 % injection 100 mL  Once in Imaging         02/15/22 1415    02/15/22 1416  MRSA Screen, PCR (Inpatient) - Swab, Nares  Once         02/15/22 1415    02/15/22 1415  NIPPV (CPAP or BIPAP)  At Bedtime & As Needed-RT         02/15/22 1415    02/15/22 1349  Diet Regular; Cardiac, Consistent Carbohydrate  Diet Effective Now         02/15/22 1348    02/15/22 1300  Vancomycin, Random  Timed,   Status:  Canceled         02/15/22 0209    02/15/22 1251  ECG 12 Lead  Once         02/15/22 1250    02/15/22 1225  POC Glucose Once  PROCEDURE ONCE         02/15/22 1220    02/15/22 1141  CT Angiogram Chest  1 Time " Imaging         02/15/22 1141    02/15/22 0900  dexamethasone (DECADRON) injection 6 mg  Daily         02/14/22 2330    02/15/22 0900  Vancomycin Pharmacy Intermittent Dosing  Daily,   Status:  Discontinued         02/15/22 0209    02/15/22 0759  D-dimer, Quantitative  STAT         02/15/22 0758    02/15/22 0702  Inpatient Cardiology Consult  IN AM        Specialty:  Cardiology  Provider:  Christo Moreland MD    02/15/22 0524    02/15/22 0700  POC Glucose TID AC  3 Times Daily Before Meals       02/14/22 2330    02/15/22 0637  Inpatient Case Management  Consult  Once        Provider:  (Not yet assigned)    02/15/22 0637    02/15/22 0600  CBC (No Diff)  Morning Draw         02/14/22 2330    02/15/22 0600  Basic Metabolic Panel  Morning Draw         02/14/22 2330    02/15/22 0522  POC Glucose Once  PROCEDURE ONCE         02/15/22 0519    02/15/22 0512  Phosphorus  STAT         02/15/22 0511    02/15/22 0511  Magnesium  STAT         02/15/22 0511    02/15/22 0500  piperacillin-tazobactam (ZOSYN) 3.375 g in iso-osmotic dextrose 50 ml (premix)  Every 8 Hours         02/14/22 2330    02/15/22 0205  ECG 12 Lead  STAT         02/15/22 0204    02/15/22 0130  apixaban (ELIQUIS) tablet 5 mg  Once         02/15/22 0038    02/15/22 0043  POC Glucose Once  PROCEDURE ONCE         02/15/22 0038    02/15/22 0038  ipratropium-albuterol (DUO-NEB) nebulizer solution 3 mL  Every 6 Hours PRN         02/15/22 0038    02/15/22 0027  Urinalysis, Microscopic Only - Urine, Clean Catch  Once         02/15/22 0026    02/15/22 0002  Critical Care  Once        Comments: This order was created via procedure documentation    02/15/22 0001    02/15/22 0000  Vital Signs Every Hour and Per Hospital Policy Based on Patient Condition  Every Hour       02/14/22 2330    02/15/22 0000  Intake and Output  Every Hour       02/14/22 2330    02/15/22 0000  insulin regular (humuLIN R,novoLIN R) injection 0-14 Units  Every 6 Hours Scheduled      "    02/14/22 2330 02/14/22 2347  STAT Lactic Acid, Reflex  PROCEDURE ONCE         02/14/22 2123 02/14/22 2332  furosemide (LASIX) injection 40 mg  Every 12 Hours,   Status:  Discontinued         02/14/22 2330 02/14/22 2332  piperacillin-tazobactam (ZOSYN) 3.375 g in iso-osmotic dextrose 50 ml (premix)  Once,   Status:  Discontinued         02/14/22 2330 02/14/22 2332  sodium chloride 0.9 % flush 10 mL  Every 12 Hours Scheduled         02/14/22 2330 02/14/22 2331  Hepatic Function Panel  Daily       02/14/22 2330 02/14/22 2331  Creatinine, Serum  Daily       02/14/22 2330 02/14/22 2331  remdesivir 200 mg in sodium chloride 0.9 % 290 mL IVPB (powder vial)  Every 24 Hours        \"Followed by\" Linked Group Details    02/14/22 2330 02/14/22 2331  Do NOT Hold Basal or Correction Scale Insulin When Patient is NPO, Hold Scheduled Mealtime (Bolus) Insulin if NPO  Continuous         02/14/22 2330 02/14/22 2331  Follow [facility] Hypoglycemia Standing Orders For Blood Glucose Less Than 70 mg/dL  Until Discontinued        Comments: ALERT PATIENT - NOT NPO & CAN SAFELY SWALLOW  Administer 4 oz Fruit Juice OR 4 oz Regular Soda OR 8 oz Milk OR 15-30 grams (1 tube) of Glucose Gel.  Recheck Blood Glucose Approximately 15 Minutes After Ingestion, Repeat Treatment & Continue to Recheck Blood Sugar Approximately Every 15 Minutes Until Blood Glucose is 70 or Higher.  Once Blood Glucose is 70 or Higher & if It Will Be More Than 60 Minutes Until Next Meal, Provide Appropriate Snack (Including Carbohydrate Food) Based on Meal Plan Order. Give Meal Tray As Soon As Possible.    PATIENT HAS IV ACCESS - UNRESPONSIVE, NPO OR UNABLE TO SAFELY SWALLOW  Administer 25g (50ml) D50W IV Push.  Recheck Blood Glucose Approximately 15 Minutes After Administration, if Blood Glucose Remains Less Than 70, Repeat Treatment   Recheck Blood Glucose Approximately 15 Minutes After 2nd Administration, if Blood Glucose Remains Less Than 70 " After 2nd Dose of D50W, Contact Provider for Further Treatment Orders & Consider Adding IVF With D5W for Maintenance    PATIENT WITHOUT IV ACCESS - UNRESPONSIVE, NPO OR UNABLE TO SAFELY SWALLOW  Administer 1mg Glucagon SQ & Establish IV Access.  Turn Patient on Side - Nausea / Vomiting May Occur.  Recheck Blood Glucose Approximately 15 Minutes After Administration.  If Blood Glucose Remains Less Than 70, Administer 25g D50W IV Push (50ml).  Recheck Blood Glucose Approximately 15 Minutes After Administration of D50W, if Blood Glucose Remains Less Than 70, Contact Provider for Further Treatment Orders & Consider Adding IVF With D5 for Maintenance    Document Event & Patient Response to Interventions in EMR, Document Medications on MAR  Notify Provider if Hypoglycemia Treatment Needed    02/14/22 2330    02/14/22 2331  Cardiac Monitoring  Continuous         02/14/22 2330    02/14/22 2331  Continuous Pulse Oximetry  Continuous         02/14/22 2330    02/14/22 2331  Height & Weight  Once         02/14/22 2330    02/14/22 2331  Daily Weights  Daily       02/14/22 2330    02/14/22 2331  Oral care for patients not on NPPV or intubated  Every Shift      Comments: Oral care for patients not on NPPV or intubated    02/14/22 2330    02/14/22 2331  Use Mobility Guidelines for Advancement of Activity  Continuous         02/14/22 2330    02/14/22 2331  Insert Peripheral IV  Once         02/14/22 2330    02/14/22 2331  Saline Lock & Maintain IV Access  Continuous         02/14/22 2330    02/14/22 2331  Code Status and Medical Interventions:  Continuous         02/14/22 2330    02/14/22 2331  Place Sequential Compression Device  Once         02/14/22 2330    02/14/22 2331  Maintain Sequential Compression Device  Continuous         02/14/22 2330    02/14/22 2330  dextrose (GLUTOSE) oral gel 15 g  Every 15 Minutes PRN         02/14/22 2330    02/14/22 2330  dextrose (D50W) (25 g/50 mL) IV injection 25 g  Every 15 Minutes PRN          02/14/22 2330 02/14/22 2330  glucagon (human recombinant) (GLUCAGEN DIAGNOSTIC) injection 1 mg  Every 15 Minutes PRN         02/14/22 2330 02/14/22 2330  sodium chloride 0.9 % flush 10 mL  As Needed         02/14/22 2330 02/14/22 2329  Pharmacy Consult - Remdesivir  Continuous PRN         02/14/22 2330 02/14/22 2328  Pharmacy to dose vancomycin  Continuous PRN         02/14/22 2330 02/14/22 2320  Inpatient Admission  Once         02/14/22 2319 02/14/22 2305  IP Palliative Care Nurse Consult  Once        Provider:  (Not yet assigned)    02/14/22 2304 02/14/22 2305  Pulmonology (on-call MD unless specified)  Once        Specialty:  Pulmonary Disease  Provider:  (Not yet assigned)    02/14/22 2304 02/14/22 2132  furosemide (LASIX) injection 80 mg  Once         02/14/22 2130 02/14/22 2132  piperacillin-tazobactam (ZOSYN) 3.375 g in iso-osmotic dextrose 50 ml (premix)  Once         02/14/22 2130 02/14/22 2132  vancomycin 2250 mg/500 mL 0.9% NS IVPB ([facility])  Once         02/14/22 2130 02/14/22 2118  Blood Gas, Arterial -  PROCEDURE ONCE         02/14/22 2112 02/14/22 2042  Magnesium  Once         02/14/22 2041 02/14/22 2042  Urinalysis With Microscopic If Indicated (No Culture) - Urine, Clean Catch  Once         02/14/22 2041 02/14/22 2042  Ethanol  Once         02/14/22 2041 02/14/22 2042  Urine Drug Screen - Urine, Clean Catch  Once         02/14/22 2041 02/14/22 2042  TSH  Once         02/14/22 2041 02/14/22 2042  T4, Free  Once         02/14/22 2041 02/14/22 2037  methylPREDNISolone sodium succinate (SOLU-Medrol) injection 125 mg  Once         02/14/22 2035 02/14/22 2036  NIPPV (CPAP or BIPAP)  Until Discontinued,   Status:  Canceled         02/14/22 2035 02/14/22 2035  Respiratory Panel PCR w/COVID-19(SARS-CoV-2) FRANCISCO/TASHA/JOSHUA/PAD/[facility]/MAD/BECKA In-House, NP Swab in UTM/VTM, 3-4 HR TAT - Swab, Nasopharynx  Once         02/14/22 2035 02/14/22 2035  Protime-INR   "Once         02/14/22 2035 02/14/22 2035  Blood Gas, Arterial -  Once         02/14/22 2035 02/14/22 2035  Blood Culture - Blood, Arm, Left  Once         02/14/22 2035 02/14/22 2035  Blood Culture - Blood, Arm, Right  Once         02/14/22 2035 02/14/22 2035  Lactic Acid, Plasma  Once         02/14/22 2035 02/14/22 2035  Procalcitonin  Once         02/14/22 2035 02/14/22 2035  ECG 12 Lead  Once,   Status:  Canceled         02/14/22 2035 02/14/22 2035  XR Chest 1 View  1 Time Imaging         02/14/22 2035 02/14/22 2035  Monitor Blood Pressure  Per Hospital Policy         02/14/22 2035 02/14/22 2035  Cardiac Monitoring  Once,   Status:  Canceled         02/14/22 2035 02/14/22 2035  Pulse Oximetry, Continuous  Continuous,   Status:  Canceled         02/14/22 2035 02/14/22 2035  Insert peripheral IV  Once        \"And\" Linked Group Details    02/14/22 2035 02/14/22 2035  sodium chloride 0.9 % flush 10 mL  As Needed        \"And\" Linked Group Details    02/14/22 2035 02/14/22 2031  NPO Diet  Diet Effective Now,   Status:  Canceled         02/14/22 2030 02/14/22 2031  Undress and Gown  Once         02/14/22 2030 02/14/22 2031  Cardiac Monitoring  Per Hospital Policy,   Status:  Canceled         02/14/22 2030 02/14/22 2031  Continuous Pulse Oximetry  Continuous,   Status:  Canceled         02/14/22 2030 02/14/22 2031  Vital Signs  Per Hospital Policy         02/14/22 2030 02/14/22 2031  ECG 12 Lead  Once         02/14/22 2030 02/14/22 2031  XR Chest 2 View  1 Time Imaging,   Status:  Canceled         02/14/22 2030 02/14/22 2031  Insert Peripheral IV  Once         02/14/22 2030 02/14/22 2031  [place] Draw  Once         02/14/22 2030 02/14/22 2031  CBC & Differential  Once         02/14/22 2030 02/14/22 2031  Comprehensive Metabolic Panel  Once         02/14/22 2030 02/14/22 2031  BNP  Once         02/14/22 2030 02/14/22 2031  Troponin  Once         " 22  Green Top (Gel)  PROCEDURE ONCE         22  Lavender Top  PROCEDURE ONCE         22  Gold Top - SST  PROCEDURE ONCE         22  Light Blue Top  PROCEDURE ONCE         22  CBC Auto Differential  PROCEDURE ONCE         22  sodium chloride 0.9 % flush 10 mL  As Needed         22  POC Glucose Once  PROCEDURE ONCE         22    Unscheduled  Oxygen Therapy- Nasal Cannula; 2 LPM; Titrate for SPO2: equal to or greater than, 92%  Continuous PRN       22                   Physician Progress Notes       Travis Giang MD at 02/15/22 1250          [facility]  [phone]  Travis Giang MD    Subjective:  LOS: 1    Chief Complaint: Respiratory failure    Probably chronic respiratory failure and needs AVAPS. Taken off NIV this morning and so far awake and tolerating.    Objective   Vital Signs past 24hrs    Temp range: Temp (24hrs), Av °F (36.1 °C), Min:96.5 °F (35.8 °C), Max:97.5 °F (36.4 °C)    BP range: BP: ()/() 131/74  Pulse range: Heart Rate:  [43-86] 55  Resp rate range: Resp:  [24-32] 24    Device (Oxygen Therapy): nasal cannulaFlow (L/min):  [6] 6  Oxygen range:SpO2:  [66 %-99 %] 92 %      115 kg (252 lb 13.9 oz); Body mass index is 34.29 kg/m².    Intake/Output Summary (Last 24 hours) at 2/15/2022 1250  Last data filed at 2/15/2022 0550  Gross per 24 hour   Intake 304 ml   Output 600 ml   Net -296 ml       Physical Exam  Constitutional:       Appearance: He is obese.   Cardiovascular:      Rate and Rhythm: Bradycardia present.      Heart sounds: No murmur heard.      Pulmonary:      Effort: Pulmonary effort is normal.      Breath sounds: Decreased breath sounds present.   Abdominal:      General: Bowel sounds are normal.      Palpations: Abdomen is soft. There is no mass.       Tenderness: There is no abdominal tenderness.   Musculoskeletal:         General: Swelling (chronic appearing LE's) present.   Neurological:      Mental Status: He is alert and oriented to person, place, and time.       Results Review:    I have reviewed the laboratory and imaging data since the last note by [facility] physician.  My annotations are noted in assessment and plan.    Results from last 7 days   Lab Units 02/15/22  1005 02/15/22  0048 02/14/22  2047   PROCALCITONIN ng/mL  --   --  0.06   LACTATE mmol/L  --  1.9 2.7*   D DIMER QUANT MCGFEU/mL 0.96*  --   --        Medication Review:  I have reviewed the current MAR.  My annotations are noted in assessment and plan.    Pharmacy Consult - Remdesivir,   Pharmacy to dose vancomycin,       Plan   PCCM Problems  Altered mental status  SARS-CoV-2 infection  Acute on chronic respiratory failure, hypoxia and hypercapnia  Acute on chronic systolic and diastolic congestive heart failure  Bradycardia  Pulmonary infiltrates consistent with pulmonary edema  Paroxysmal atrial fibrillation on AC  History stroke  Acute kidney injury  Sepsis/cellulitis  Diabetes type 2  Ex-smoker  Suspected COPD  YEISON on CPAP  Morbid obesity  Cardiomyopathy, EF 30%    .    Plan of Treatment    Alert and oriented now.    Tolerates NIV. Will need AVAPS at home. Keep ICU today for assurance then transfer out tomorrow.    This patient has chronic respiratory failure and requires non-invasive ventilation. Other modalities such as CPAP and BIPAP were considered but ruled out due to severity of lung disease and their ineffectiveness in this condition. Non-invasive-ventilation is required to decrease work of breathing, improve pulmonary status and interruption of respiratory support which could lead to serious harm or death.    Settings on Trilogy:  Mode- AVAPS-AE; Rate - Auto; Goal Tidal Volume 500cc; IPAP max 30; EPAP max 20; EPAP min 4;  PS max 16; PS min 4; FiO2 - O2 bled in at 2L      Acute on  chronic systolic and diastolic CHF. On diuretics - hold today due to elevated creatinine.    Bradycardia - no BB. Paroxysmal afib on AC.    Acute kidney injury - now off iv diuretics. Watch.     Suspected cellulitis on admission-started on Zosyn and Vancomycin.  His procalcitonin was normal. Will stop abx if cultures NG.    On a sliding scale for his diabetes.  Regular diet today.      Travis Giang MD  02/15/22  12:50 EST    While in the room and during my examination of the patient I wore gloves, gown, mask, eye protection and followed enhanced droplet/contact isolation protocol and precautions.  I washed my hands before and after this patient encounter.    Part of this note may be an electronic transcription/translation of spoken language to printed text using the Dragon Dictation System.      Electronically signed by Travis Giang MD at 02/15/22 1415          Consult Notes      Ho Espinoza MD at 02/15/22 0714      Consult Orders    1. Inpatient Cardiology Consult [[id number]] ordered by Olimpia Peña MD at 02/15/22 0512                 Hospital Consult    Patient Name: Jesus Izaguirre Jr.  Age/Sex: 76 y.o. male  : 1945  MRN: [id number]    Date of Admission: 2022  Date of Encounter Visit: 02/15/22  Encounter Provider: Ho Espinoza MD        Referring Provider: Olimpia Peña MD  Patient Care Team:  Jason Joya APRN as PCP - General (Family Medicine)    Subjective:   Consulted for: Bradycardia    Chief Complaint:  Altered mental status    History of Present Illness:  Jesus Izaguirre Jr. is a 76 y.o. male normally followed by Dr. Hudson with a history of obesity, PAF, HTN, stroke, HLD, and ongoing tobacco abuse.     His propafenone was stopped in the past due to wide QRS.    Dr. Alcantara saw him in hospital consultation on 22 for concerns of heart block in the setting of pulmonary edema/CHF. EMS had found him in respiratory distress and there was concern if he had been compliant  with medications as all of his medication bottles were still full. He was diuresed and placed on bipap and eventually intubated. Dr. Alcantara reviewed his EKGS and telemetry and felt that he was in a sinus arrhythmia, right bundle branch block, left anterior fascicular block, and previously documented bradycardia- no clear evidence of heart block. He had an echocardiogram on 1/17/22 which found EF of 30%, left ventricular global hypokinesis, grade I diastolic dysfunction, & mildly increased atrial volume. Given elevated creatine, cardiac catheterization was held off, but there were plans for outpatient ischemic work up. Low dose coreg was added to his regimen to help with his cardiomyopathy, which was then switched to 25 mg of metoprolol tartrate BID for issues with tachycardia. He was to resume apixaban at discharge. He was sent home on 1/24/22 with 20 mg of lisinopril as well.     He came into the hospital yesterday for altered mental status where he was difficult to arouse at home. His O2 sats were in the 60s and he was placed on bipap in the ER. He testes positive for covid19 yesterday. He was started on 40 mg of lasix BID, decadron, and remdesivir.    Chest x ray was negative for acute processes.    Negative troponin x 1. proBNP of 11,950. WBC of 15.97. creatine of 1.33.     Previous Testing-  Echocardiogram on 1/17/22-  Interpretation Summary    · The quality of the study is limited due to patient body habitus, patient positioning and patient being intubated.  · The right ventricular cavity is borderline dilated.  · Mildly reduced right ventricular systolic function noted.  · The left ventricular cavity is mildly dilated.  · Estimated left ventricular EF = 30% Left ventricular systolic function is moderately decreased.  · There is left ventricular global hypokinesis noted.  · The following left ventricular wall segments are hypokinetic: mid anterior, apical anterior, basal anterolateral, mid anterolateral,  apical lateral, basal inferolateral, mid inferolateral, apical inferior, mid inferior, apical septal, basal inferoseptal, mid inferoseptal, apex hypokinetic, mid anteroseptal, basal anterior, basal inferior and basal inferoseptal.  · Left atrial volume is mildly increased.  · Left ventricular diastolic function is consistent with (grade I) impaired relaxation.    Echocardiogram on 5/19/2021  · Calculated left ventricular EF = 50.2% Estimated left ventricular EF was in agreement with the calculated left ventricular EF. Left ventricular systolic function is low normal. Normal left ventricular cavity size noted. Left ventricular wall thickness is consistent with moderate concentric hypertrophy. Left ventricular diastolic function is consistent with (grade II w/high LAP) pseudonormalization.  · The left atrial cavity is mildly dilated.    Holter Monitor 3/9/2017  Study Findings     No symptoms reported during the monitoring period. No complications noted. The predominant rhythm noted during the testing period was sinus rhythm. Premature atrial contractions occured frequently. There were 288 premature atrial contractions per hour. There was no evidence of atrial arrhythmias. There were no episodes of supraventricular tachycardia. Premature ventricular contractions occured frequently. There were 83 premature ventricular contractions per hour. Ventricular couplets. There was one episodes of ventricular tachycardia. The peak heart rate was 122 beats per minute. One 5-bt run VT.. Sinoatrial node conduction was normal. No atrioventricular block noted.      Study Impressions     An abnormal monitor study.               Past Medical History:  Past Medical History:   Diagnosis Date   • Acute on chronic combined systolic and diastolic CHF (congestive heart failure) (HCC) 1/20/2022   • Asthma    • BPH (benign prostatic hypertrophy)    • Class 2 obesity in adult 4/7/2019   • Diabetes mellitus (HCC)    • Fatigue    • HLD  (hyperlipidemia) 7/5/2016   • Hypertension    • Nephrolithiasis    • Stroke (HCC) 12/2015   • Tobacco abuse 3/24/2018   • Type 2 diabetes mellitus with diabetic polyneuropathy, without long-term current use of insulin (HCC)    • Venous insufficiency        Past Surgical History:   Procedure Laterality Date   • LITHOTRIPSY      Renal       Home Medications:   Medications Prior to Admission   Medication Sig Dispense Refill Last Dose   • apixaban (ELIQUIS) 5 MG tablet tablet Take 5 mg by mouth 1 (One) Time.      • arformoterol (BROVANA) 15 MCG/2ML nebulizer solution Take 2 mL by nebulization 2 (Two) Times a Day. 120 mL     • aspirin 325 MG tablet Take 1 tablet by mouth Daily. 90 tablet 3    • atorvastatin (LIPITOR) 80 MG tablet Take 1 tablet by mouth every night at bedtime. 90 tablet 3    • budesonide (PULMICORT) 0.5 MG/2ML nebulizer solution Take 4 mL by nebulization 2 (Two) Times a Day.      • Dapagliflozin Propanediol (Farxiga) 10 MG tablet Take 10 mg by mouth Daily. 90 tablet 3    • furosemide (LASIX) 40 MG tablet Take 1 tablet by mouth 2 (Two) Times a Day.      • glucose blood test strip Amber Contour Next Test In Vitro Strip; Patient Sig: Amber Contour Next Test In Vitro Strip TEST ONCE DAILY; 50; 3; 04-Mar-2015; Active 100 each 3    • insulin regular (humuLIN R,novoLIN R) 100 UNIT/ML injection Inject 0-9 Units under the skin into the appropriate area as directed 4 (Four) Times a Day Before Meals & at Bedtime.  12    • ipratropium-albuterol (DUO-NEB) 0.5-2.5 mg/3 ml nebulizer Take 3 mL by nebulization Every 6 (Six) Hours As Needed for Wheezing or Shortness of Air. 360 mL     • lisinopril (PRINIVIL,ZESTRIL) 20 MG tablet Take 1 tablet by mouth Daily.      • metoprolol succinate XL (TOPROL-XL) 25 MG 24 hr tablet Take 1 tablet by mouth Daily. 90 tablet 3    • Petrolatum 42 % ointment Apply 1 application topically to the appropriate area as directed Every 12 (Twelve) Hours.      • vitamin D (ERGOCALCIFEROL) 1.25 MG  (75799 UT) capsule capsule Take 1 capsule by mouth 1 (One) Time Per Week. 12 capsule 3        Allergies:  No Known Allergies    Past Social History:  Social History     Socioeconomic History   • Marital status:    Tobacco Use   • Smoking status: Former Smoker     Packs/day: 1.00     Quit date:      Years since quittin.1   • Smokeless tobacco: Former User   • Tobacco comment: Cessation 2014; HX of 1 ppd for 52 yrs.   Vaping Use   • Vaping Use: Never used   Substance and Sexual Activity   • Alcohol use: No     Comment: caffeine use    • Drug use: No   • Sexual activity: Defer        Past Family History:  Family History   Problem Relation Age of Onset   • Arthritis Mother    • Cancer Mother    • Migraines Mother    • Hypertension Other        Review of Systems: Unable to obtain  Objective:     Objective:  Temp:  [96.5 °F (35.8 °C)] 96.5 °F (35.8 °C)  Heart Rate:  [43-86] 53  Resp:  [24-32] 24  BP: ()/(52-95) 99/68    Intake/Output Summary (Last 24 hours) at 2/15/2022 0757  Last data filed at 2/15/2022 0550  Gross per 24 hour   Intake 304 ml   Output 600 ml   Net -296 ml     Body mass index is 34.29 kg/m².      02/15/22  0038 02/15/22  0516   Weight: 116 kg (255 lb 8.2 oz) 115 kg (252 lb 13.9 oz)           Physical Exam:   Vitals reviewed.   Constitutional:       Appearance: Well-developed. Obese. Chronically ill-appearing.   HENT:      Head: Normocephalic.   Neck:      Thyroid: No thyromegaly.      Vascular: No carotid bruit or JVD.   Pulmonary:      Effort: Pulmonary effort is normal.      Breath sounds: Normal breath sounds.   Cardiovascular:      Bradycardia present. Irregular rhythm. Normal S1. Normal S2.      Murmurs: There is no murmur.      No gallop.   Edema:     Peripheral edema absent.   Skin:     General: Skin is warm and dry.      Findings: No erythema.   Neurological:      Mental Status: Lethargic.           Lab Review:     Results from last 7 days   Lab Units 02/15/22  4476  02/15/22  0048 22   SODIUM mmol/L 145  --   --  137   POTASSIUM mmol/L 4.8  --   --  4.4   CHLORIDE mmol/L 107  --   --  101   CO2 mmol/L 29.0  --   --  27.3   BUN mg/dL 27*  --   --  27*   CREATININE mg/dL 1.33* 1.35*  --  1.27   GLUCOSE mg/dL 180*  --    < > 270*   CALCIUM mg/dL 8.2*  --   --  9.1    < > = values in this interval not displayed.       Results from last 7 days   Lab Units 22   TROPONIN T ng/mL <0.010     Results from last 7 days   Lab Units 02/15/22  0301   WBC 10*3/mm3 15.97*   HEMOGLOBIN g/dL 14.2   HEMATOCRIT % 45.1   PLATELETS 10*3/mm3 220     Results from last 7 days   Lab Units 22   INR  1.03         Results from last 7 days   Lab Units 02/15/22  0301   MAGNESIUM mg/dL 2.1         Results from last 7 days   Lab Units 22   PROBNP pg/mL 11,950.0*         Results from last 7 days   Lab Units 22   TSH uIU/mL 1.590       Imaging:      Imaging Results (Most Recent)     Procedure Component Value Units Date/Time    XR Chest 1 View [[id number]] Collected: 22     Updated: 22    Narrative:      PORTABLE CHEST 2022 AT 8:39 PM     CLINICAL HISTORY: Dyspnea     Compared to the previous chest dated 2022.     The lungs are fairly well-expanded and appear free of infiltrates. There  are no pleural effusions. The heart is moderately enlarged and  unchanged. The pulmonary vasculature is within normal limits.     IMPRESSIONS: Moderate cardiomegaly. No evidence of acute disease within  the chest.     This report was finalized on 2022 9:22 PM by Dr. Rod Lawson M.D.             EK/15/22-      EKG on 22-      Assessment:   Assessment/Plan         Acute respiratory failure with hypoxia and hypercapnia (HCC)  1. Acute repiratory failure  2. Acute/ Chronic Systolic CHF: on IV lasix.  Can't tolerate BB or ACE/Arb due to CKD.  CXR fairly clear.  3. Encephalopathy: new  4. Bradycardia: hold  BB  5. Covid 19      Plan:     Continue IV diuretics  Check D-Dimer - R/O PE    Portions of above entered by Leah White RN validated.    Thank you for allowing me to participate in the care of Jesus Izaguirre Jr.. Feel free to contact me directly with any further questions or concerns.    Ho Espinoza MD  [facility]  02/15/22  07:57 EST      Electronically signed by Ho Espinoza MD at 02/15/22 9416

## 2022-02-16 NOTE — DISCHARGE PLACEMENT REQUEST
"Jesus Mcnamara Jr. (76 y.o. Male)             Date of Birth Social Security Number Address Home Phone MRN    1945  [address] [phone] [id number]    Rastafari Marital Status             None        Admission Date Admission Type Admitting Provider Attending Provider Department, Room/Bed    2/14/22 Emergency Olimpia Peña MD Sayied, Tausif, MD [facility] CORONARY [facility], N326/1    Discharge Date Discharge Disposition Discharge Destination                         Attending Provider: Olimpia Peña MD    Allergies: No Known Allergies    Isolation: Enh Drop/Con   Infection: COVID (confirmed) (02/14/22)   Code Status: CPR   Advance Care Planning Activity    Ht: 182.9 cm (72\")   Wt: 117 kg (257 lb 0.9 oz)    Admission Cmt: None   Principal Problem: None                Active Insurance as of 2/14/2022     Primary Coverage     Payor Plan Insurance Group Employer/Plan Group    AETNA MEDICARE REPLACEMENT AETNA MEDICARE REPLACEMENT [id number]     Payor Plan Address Payor Plan Phone Number Payor Plan Fax Number Effective Dates    [address] [phone]  4/1/2021 - None Entered    [facility] [id number]       Subscriber Name Subscriber Birth Date Member ID       JESUS MCNAMARA JR. 1945 [id number]                 Emergency Contacts      (Rel.) Home Phone Work Phone Mobile Phone    mylene mcnamara (Son) [phone] -- [phone]    Lisa Mcnamara (Daughter) [phone] -- [phone]    DmitriyJose (Son) -- -- --              "

## 2022-02-16 NOTE — NURSING NOTE
Pt remains in CCU. On 6L n/c. Remains bradycardic with frequent PVC/PACs (rate 40s-50s). Blood pressure stable. Pt is AOX4, however forgetful at times. Plan of care discussed with patient. Continue to monitor.

## 2022-02-17 NOTE — NURSING NOTE
Call out to MD re something else for pain. Pt trying to get oob approx every 2min due to being uncomfortable. Await return call

## 2022-02-17 NOTE — CONSULTS
FIRST UROLOGY CONSULT      Patient Identification:  NAME:  Jesus Izaguirre Jr.  Age:  76 y.o.   Sex:  male   :  1945   MRN:  [id number]       Chief complaint: Left renal stone    History of present illness:  This is a 76 year old man with CHF, AFib on Eliquis who was admitted with altered mental status, hypoxic respiratory failure, and coronavirus pneumonia. CT abd/pelvis demonstrated a large left proximal ureteral stone with hydronephrosis. A urology consultation was requested. The patient reports diffuse abdominal pain, including LLQ pain and left flank pain. No recent BM. He reports difficulty urinating as well.      Past medical history:  Past Medical History:   Diagnosis Date   • Acute on chronic combined systolic and diastolic CHF (congestive heart failure) ([facility]) 2022   • Asthma    • BPH (benign prostatic hypertrophy)    • Class 2 obesity in adult 2019   • Diabetes mellitus ([facility])    • Fatigue    • HLD (hyperlipidemia) 2016   • Hypertension    • Nephrolithiasis    • Stroke ([facility]) 2015   • Tobacco abuse 3/24/2018   • Type 2 diabetes mellitus with diabetic polyneuropathy, without long-term current use of insulin ([facility])    • Venous insufficiency        Past surgical history:  Past Surgical History:   Procedure Laterality Date   • LITHOTRIPSY      Renal       Allergies:  Patient has no known allergies.    Home medications:  Medications Prior to Admission   Medication Sig Dispense Refill Last Dose   • apixaban (ELIQUIS) 5 MG tablet tablet Take 5 mg by mouth 1 (One) Time.      • arformoterol (BROVANA) 15 MCG/2ML nebulizer solution Take 2 mL by nebulization 2 (Two) Times a Day. 120 mL     • aspirin 325 MG tablet Take 1 tablet by mouth Daily. 90 tablet 3    • atorvastatin (LIPITOR) 80 MG tablet Take 1 tablet by mouth every night at bedtime. 90 tablet 3    • budesonide (PULMICORT) 0.5 MG/2ML nebulizer solution Take 4 mL by nebulization 2 (Two) Times a Day.      • Dapagliflozin Propanediol  (Farxiga) 10 MG tablet Take 10 mg by mouth Daily. 90 tablet 3    • furosemide (LASIX) 40 MG tablet Take 1 tablet by mouth 2 (Two) Times a Day.      • glucose blood test strip Amber Contour Next Test In Vitro Strip; Patient Sig: Amber Contour Next Test In Vitro Strip TEST ONCE DAILY; 50; 3; 04-Mar-2015; Active 100 each 3    • insulin regular (humuLIN R,novoLIN R) 100 UNIT/ML injection Inject 0-9 Units under the skin into the appropriate area as directed 4 (Four) Times a Day Before Meals & at Bedtime.  12    • ipratropium-albuterol (DUO-NEB) 0.5-2.5 mg/3 ml nebulizer Take 3 mL by nebulization Every 6 (Six) Hours As Needed for Wheezing or Shortness of Air. 360 mL     • lisinopril (PRINIVIL,ZESTRIL) 20 MG tablet Take 1 tablet by mouth Daily.      • metoprolol succinate XL (TOPROL-XL) 25 MG 24 hr tablet Take 1 tablet by mouth Daily. 90 tablet 3    • Petrolatum 42 % ointment Apply 1 application topically to the appropriate area as directed Every 12 (Twelve) Hours.      • vitamin D (ERGOCALCIFEROL) 1.25 MG (36154 UT) capsule capsule Take 1 capsule by mouth 1 (One) Time Per Week. 12 capsule 3         Hospital medications:  carvedilol, 3.125 mg, Oral, BID With Meals  dexamethasone, 6 mg, Intravenous, Daily  insulin regular, 0-14 Units, Subcutaneous, Q6H  piperacillin-tazobactam, 3.375 g, Intravenous, Q8H  remdesivir, 100 mg, Intravenous, Q24H  sodium chloride, 10 mL, Intravenous, Q12H      Pharmacy Consult - Remdesivir,   sodium chloride, 50 mL/hr, Last Rate: 50 mL/hr (02/17/22 1101)      dextrose  •  dextrose  •  glucagon (human recombinant)  •  ipratropium-albuterol  •  nitroglycerin  •  oxyCODONE-acetaminophen  •  Pharmacy Consult - Remdesivir  •  sodium chloride  •  [COMPLETED] Insert peripheral IV **AND** sodium chloride  •  sodium chloride    Family history:  Family History   Problem Relation Age of Onset   • Arthritis Mother    • Cancer Mother    • Migraines Mother    • Hypertension Other        Social  history:  Social History     Tobacco Use   • Smoking status: Former Smoker     Packs/day: 1.00     Quit date:      Years since quittin.1   • Smokeless tobacco: Former User   • Tobacco comment: Cessation 2014; HX of 1 ppd for 52 yrs.   Vaping Use   • Vaping Use: Never used   Substance Use Topics   • Alcohol use: No     Comment: caffeine use    • Drug use: No       Review of systems:      Positive for:  As per HPI  Negative for:  As per HPI    Objective:  TMax 24 hours:   Temp (24hrs), Av.8 °F (36.6 °C), Min:96.8 °F (36 °C), Max:98.4 °F (36.9 °C)      Vitals Ranges:   Temp:  [96.8 °F (36 °C)-98.4 °F (36.9 °C)] 98.3 °F (36.8 °C)  Heart Rate:  [57-90] 90  Resp:  [18-26] 18  BP: (119-178)/() 126/96    Intake/Output Last 3 shifts:  I/O last 3 completed shifts:  In: 720 [P.O.:720]  Out: 200 [Urine:200]     Physical Exam:    General Appearance:    Alert, cooperative, NAD   HEENT:    No trauma, pupils reactive, hearing intact   Back:     No CVA tenderness   Lungs:     Respirations unlabored, no wheezing    Heart:    RRR, intact peripheral pulses   Abdomen:     Soft, distended, mildly tender.   :    No suprapubic distension   Extremities:   No edema, no deformity   Lymphatic:   No neck or groin LAD   Skin:   No bleeding, bruising or rashes   Neuro/Psych:   Orientation intact, mood/affect pleasant, no focal findings       Results review:   I reviewed the patient's new clinical results.    Data review:  Lab Results (last 24 hours)     Procedure Component Value Units Date/Time    POC Glucose Once [[id number]]  (Abnormal) Collected: 22 1112    Specimen: Blood Updated: 22 1113     Glucose 258 mg/dL      Comment: Meter: [id number] : [id number] Cuong Nikolasjessica SAMUEL       Basic Metabolic Panel [[id number]]  (Abnormal) Collected: 22 0558    Specimen: Blood Updated: 22 0716     Glucose 237 mg/dL      BUN 48 mg/dL      Creatinine 2.02 mg/dL      Sodium 143 mmol/L      Potassium 4.1 mmol/L       Chloride 102 mmol/L      CO2 31.4 mmol/L      Calcium 9.5 mg/dL      eGFR Non African Amer 32 mL/min/1.73      BUN/Creatinine Ratio 23.8     Anion Gap 9.6 mmol/L     Narrative:      GFR Normal >60  Chronic Kidney Disease <60  Kidney Failure <15      Hepatic Function Panel [[id number]]  (Abnormal) Collected: 02/17/22 0558    Specimen: Blood Updated: 02/17/22 0711     Total Protein 6.8 g/dL      Albumin 3.20 g/dL      ALT (SGPT) 35 U/L      AST (SGOT) 12 U/L      Alkaline Phosphatase 95 U/L      Total Bilirubin 0.4 mg/dL      Bilirubin, Direct 0.2 mg/dL      Bilirubin, Indirect 0.2 mg/dL     C-reactive Protein [[id number]]  (Abnormal) Collected: 02/17/22 0558    Specimen: Blood Updated: 02/17/22 0711     C-Reactive Protein 2.04 mg/dL     Phosphorus [[id number]]  (Abnormal) Collected: 02/17/22 0558    Specimen: Blood Updated: 02/17/22 0711     Phosphorus 4.8 mg/dL     CBC (No Diff) [[id number]]  (Abnormal) Collected: 02/17/22 0558    Specimen: Blood Updated: 02/17/22 0626     WBC 17.91 10*3/mm3      RBC 5.79 10*6/mm3      Hemoglobin 16.4 g/dL      Hematocrit 50.4 %      MCV 87.0 fL      MCH 28.3 pg      MCHC 32.5 g/dL      RDW 13.7 %      RDW-SD 43.4 fl      MPV 11.4 fL      Platelets 285 10*3/mm3     POC Glucose Once [[id number]]  (Abnormal) Collected: 02/17/22 0514    Specimen: Blood Updated: 02/17/22 0515     Glucose 195 mg/dL      Comment: Meter: [id number] : [id number] Taj SAMUEL       Blood Culture - Blood, Arm, Right [[id number]]  (Normal) Collected: 02/14/22 2047    Specimen: Blood from Arm, Right Updated: 02/16/22 2345     Blood Culture No growth at 24 hours    POC Glucose Once [[id number]]  (Abnormal) Collected: 02/16/22 2001    Specimen: Blood Updated: 02/16/22 2021     Glucose 228 mg/dL      Comment: Meter: [id number] : [id number] Natan Alton LIZZIE       POC Glucose Once [[id number]]  (Abnormal) Collected: 02/16/22 1714    Specimen: Blood Updated: 02/16/22 1714     Glucose 212 mg/dL      Comment: Meter:  [id number] : [id number] Sundar SAMUEL              Imaging:  Imaging Results (Last 24 Hours)     Procedure Component Value Units Date/Time    XR Abdomen KUB [[id number]] Collected: 02/17/22 1119     Updated: 02/17/22 1126    Narrative:      ABDOMEN KUB     CLINICAL HISTORY: Kidney stones     3 supine views were obtained and are correlated with a CT scan of the  abdomen dated 02/16/2022.     There is an approximately 11 x 7 mm diameter calculus located on the  left at the L2-L3 disc space level that was shown to be at the left  ureteropelvic junction on the CT. This appears unchanged in position and  configuration. Multiple large irregular calcific densities project over  the right kidney that are most likely radiopaque material within the  colon since the CT scan demonstrated no radiopaque calculi within the  right renal collecting system. The bowel gas pattern is unremarkable.     This report was finalized on 2/17/2022 11:23 AM by Dr. Rod Lawson M.D.       CT Abdomen Pelvis With Contrast [[id number]] Collected: 02/16/22 2325     Updated: 02/16/22 2325    Narrative:      CT OF THE ABDOMEN AND PELVIS WITH CONTRAST 02/16/2022     HISTORY: Abdominal distention.     TECHNIQUE: Spiral images were obtained from the lung bases to the  symphysis pubis after intravenous contrast. Small amount of oral  contrast appears to be present.     FINDINGS: There are small bilateral pleural effusions with bilateral  lower lobe atelectasis.     The liver, gallbladder, spleen, pancreas appear within normal limits.  There is fullness of the bilateral adrenal glands most of which have a  somewhat nodular appearance. Nodular density on the right adrenal gland  measures approximately 2.2 cm. Nodular density on the left adrenal gland  measures approximately 2.6 cm. There is some contrast material in the  left collecting system with an approximately 13 mm stone in the left  renal pelvis. Two or 3 additional tiny stones are seen in  the left  collecting system. Right kidney appears unremarkable except for a tiny  right renal cyst.     Umbilical hernia containing fat is seen.     There is contrast in the urinary bladder.     No bowel wall thickening or bowel dilatation is seen. There is a trace  amount of free fluid in the right lower quadrant.       Impression:      1. Small bilateral pleural effusions and bilateral lower lobe  atelectasis.  2. Approximately 13 mm to 14 mm left renal stone with some minimal  dilatation of the left collecting system. At least 2 nonobstructing left  intrarenal stones are seen.  3. Adrenal fullness with bilateral adrenal nodules suspected to  represent small adrenal adenomas.  4. Trace amount of free fluid in the right lower quadrant.  5. Umbilical hernia containing fat.  6. Report will be called to 6 N.     Radiation dose reduction techniques were utilized, including automated  exposure control and exposure modulation based on body size.                   Assessment:       Acute respiratory failure with hypoxia and hypercapnia (HCC)    Acute kidney injury    Hydronephrosis    Left ureteral stone    Plan:     - Due to the presence of many other active medical problems and with acute renal failure, I recommended we place a left ureteral stent at this time. We will add the patient on for stent placement today.    Calvin Flores Jr., MD  02/17/22  12:05 EST

## 2022-02-17 NOTE — CONSULTS
Cc: Abdominal pain    History of presenting illness:   This is a nice, chronically ill 76-year-old gentleman admitted with altered mental status and hypoxia.  He had recently been admitted to the hospital with hypoxic respiratory failure.  He is now admitted with Covid and although his mental status seems better this morning, he remains on high flow oxygen.  Additionally, he has a history of congestive heart failure with documented ejection fraction of 30% and diastolic dysfunction and atrial fibrillation on Eliquis.  The patient relates he has had about 3 or 4 days of abdominal pain and points to the left lower quadrant as the source of greatest pain.  His bowels have not function in the last couple of days.  He denies much nausea but does not have much appetite.  No known fever.    Past Medical History: Combined systolic and diastolic heart failure, asthma, obesity, diabetes, hypertension, hyperlipidemia, tobacco abuse    Past Surgical History: Only known operative procedure with lithotripsy    Medications: Reviewed and reconciled in epic, noted to include Eliquis, I believe most recently taken yesterday    Allergies: None known    Social History: Former smoker who quit around 8 years ago    Family History: Negative for colorectal cancer    Review of Systems:  Constitutional: Positive for weakness, fatigue, decreased appetite, negative for known fever or chills  HENT: no hearing change, no runny nose  Eyes: no visual changes or icterus  Neck: no swollen glands or dysphagia or odynophagia  Respiratory: negative for SOB, cough, hemoptysis or wheezing  Cardiovascular: negative for chest pain, palpitations or peripheral edema  Gastrointestinal: Positive for abdominal pain, bloating, constipation  Musculoskeletal: negative for myalgias or joint pain  Skin: patient denies icterus or rash  Hematologic: negative for easy bleeding or bruising      Physical Exam:  Body mass index: 42.3  Vitals: Temperature 96.8 heart rate  86 blood pressure 137/108 oxygenation 92% on 12 L high flow cannula  General: alert and oriented, appropriate, no acute distress but chronic ill appearance  HENT:  Head is normocephalic, atraumatic, mucous membranes are moist  Eyes: Extraocular movements are intact, no scleral icterus  Neck: Supple without lymphadenopathy or thyromegaly, trachea is in the midline  Respiratory: Lungs are clear bilaterally without wheezing, no use of accessory muscles is noted  Cardiovascular: Regular rate and rhythm without murmur, no peripheral edema  Gastrointestinal: Soft and mildly distended, there is minimal tenderness, there is no peritonitis, there is no guarding.  There is a reducible and nontender periumbilical hernia.  Musculoskeletal: Muscle bulk and strength is decreased but equal bilaterally.  No gross deformity.  Skin: No rash or icterus noted    Laboratory data: White blood cell count increased to 17.9 from 13 yesterday (I believe patient has received steroids) with a left shift.  Hemoglobin 16.4 platelets 285 creatinine 2.0 albumin 3.2    Imaging data: CT images are reviewed by me and discussed with radiology.  He does have a 13 mm kidney stone.  I think most relevant though is fluid collection, probably retroperitoneal lateral to the duodenum and posterior to the colon with an air-fluid level.  There does appear to be a small amount of contrast in it as well.  I suspect this most likely represents a contained duodenal perforation.      Assessment and plan:   -Probable contained duodenal perforation  -Covid positive  -Moderate obesity with body mass index in excess of 40  -Severe congestive heart failure with ejection fraction of 30%  -Type 2 diabetes  -Atrial fibrillation on Eliquis, I have held his Eliquis as of now  -Kidney stones, urology to see  -I discussed the case with the patient's daughter.  I think that percutaneous drainage is the best option.  This does appear to be contained and the patient would be an  extremely poor surgical candidate.  I do not believe he would survive a major laparotomy, and if it is truly a distal second or third portion of the duodenum perforation, surgical options are limited regardless.  I have started IV antibiotics.  We will follow along closely.  The patient's prognosis is guarded.      Dameon Nicole MD, FACS  General, Minimally Invasive and Endoscopic Surgery  [facility]    [address], [address]  [place], [place], [address]  P: [phone]  F: [phone]

## 2022-02-17 NOTE — CODE DOCUMENTATION
Patient Name:  Jesus Izaguirre Jr.  YOB: 1945  MRN:  [id number]  Admit Date:  2/14/2022    Visit Diagnoses:     ICD-10-CM ICD-9-CM   1. Acute respiratory failure with hypoxia and hypercapnia ([facility])  J96.01 518.81    J96.02    2. Altered mental status, unspecified altered mental status type  R41.82 780.97   3. COVID-19  U07.1 079.89   4. Congestive heart failure, unspecified HF chronicity, unspecified heart failure type ([facility])  I50.9 428.0   5. Sepsis, due to unspecified organism, unspecified whether acute organ dysfunction present ([facility])  A41.9 038.9     995.91       Reason For Rapid:   Vomiting with aspiration, Resp distress    RN Communicated With:  STORM CHANEY called on arrival to CT scan for respiratory arrest.    Suspected aspiration.  Massive amount of bloody GI contents present on and around pt's mouth and body.  Oral suctioning in progress.  Respirations found to be agonal with O2 sats in the 70%.  He was cyanotic and unresponsive.  Pulse present.   Ambu-bag ventilation initiated. Dr Davey ER responded.  See code blue documentation      Rapid Outcome:  ROSC achieved with continuous transcutaneous pacing.  Set 120 mA with rate 80 to achieve pulse.    Transferred to CCU.  See Code blue documentation on arrival to unit.   Total NGT output-  1800 ml of bloody gastric fluid suctioned out.  There was also a significant amount of un-measurable vomitus              Most Recent Vital Signs  Temp:  [96.8 °F (36 °C)-98.3 °F (36.8 °C)] 98.3 °F (36.8 °C)  Heart Rate:  [61-90] 80  Resp:  [16-26] 16  BP: ()/() 109/96  SpO2:  [89 %-100 %] 94 %  on  Flow (L/min):  [3-13] 13;   Device (Oxygen Therapy): high-flow nasal cannula; humidified    Labs:  Results from last 7 days   Lab Units 02/14/22 2048   COVID19  Detected*                  Please refer to full rapid documentation on summary page under Index / Code Timeline

## 2022-02-17 NOTE — CODE DOCUMENTATION
Code called upon arrival of patient into the CCU.  I arrived shortly after.  CPR in progress.  Epi drip hanging from recent previous code.  Patient had already been intubated by the ED staff in the CT scan unit.  Given 2 A of bicarb and CPR continued.  Epi rounds were given.  Patient recovered pulse.  Continuous epinephrine drip and continuous bicarbonate drip for now.  Successful resuscitation.

## 2022-02-17 NOTE — SIGNIFICANT NOTE
02/17/22 1359   OTHER   Discipline physical therapist   Rehab Time/Intention   Session Not Performed patient unavailable for evaluation  (pt is confused, in restraints, and going for procedure, PT will follow up tomorrow)   Recommendation   PT - Next Appointment 02/18/22

## 2022-02-17 NOTE — NURSING NOTE
Pt moved to CCU. Called and updated Paco Izaguirre Pt Son. Informed him to come to Hospital to see Pt per House. MARLON

## 2022-02-17 NOTE — PROCEDURES
Procedure: Placing central venous access in right femoral vein    Indication: vascular access    Informed consent was EMERGENCY - POST CODE, NEEDS ACCESS    Time out was completed and patient ID was confirmed.    The right femoral vein was directly visualized by ultrasound. Thereafter the site was cleaned and draped. 1% lidocaine was given under the skin. Using sterile technique introducer needle was placed in the vein under direct visualization using ultrasound. A quadruple lumen catheter was placed over wire using Seldinger technique. All 4 ports withdrew blood and were flushed with saline. Sutures and dressing applied. No complications of procedure.            Electronically signed by Travis Giang MD, 02/17/22, 4:39 PM EST.

## 2022-02-17 NOTE — NURSING NOTE
Pt has been awake all shift. Very restless.  Frequently taking off heart monitor and trying to get out of bed. C/o of abd pain med given. Denies nausea. Abd very distended and firm. Pt oriented x2 very forgetful. Dr Peña called multiple times this shift for updates and pt issues. Pt now on 12L HFNC with sat 90-94%. Orders for urology and gen surg to see today. Other VSS. Pt has been incont of urine this shift. No distress at present.

## 2022-02-17 NOTE — H&P (VIEW-ONLY)
Cc: Abdominal pain    History of presenting illness:   This is a nice, chronically ill 76-year-old gentleman admitted with altered mental status and hypoxia.  He had recently been admitted to the hospital with hypoxic respiratory failure.  He is now admitted with Covid and although his mental status seems better this morning, he remains on high flow oxygen.  Additionally, he has a history of congestive heart failure with documented ejection fraction of 30% and diastolic dysfunction and atrial fibrillation on Eliquis.  The patient relates he has had about 3 or 4 days of abdominal pain and points to the left lower quadrant as the source of greatest pain.  His bowels have not function in the last couple of days.  He denies much nausea but does not have much appetite.  No known fever.    Past Medical History: Combined systolic and diastolic heart failure, asthma, obesity, diabetes, hypertension, hyperlipidemia, tobacco abuse    Past Surgical History: Only known operative procedure with lithotripsy    Medications: Reviewed and reconciled in epic, noted to include Eliquis, I believe most recently taken yesterday    Allergies: None known    Social History: Former smoker who quit around 8 years ago    Family History: Negative for colorectal cancer    Review of Systems:  Constitutional: Positive for weakness, fatigue, decreased appetite, negative for known fever or chills  HENT: no hearing change, no runny nose  Eyes: no visual changes or icterus  Neck: no swollen glands or dysphagia or odynophagia  Respiratory: negative for SOB, cough, hemoptysis or wheezing  Cardiovascular: negative for chest pain, palpitations or peripheral edema  Gastrointestinal: Positive for abdominal pain, bloating, constipation  Musculoskeletal: negative for myalgias or joint pain  Skin: patient denies icterus or rash  Hematologic: negative for easy bleeding or bruising      Physical Exam:  Body mass index: 42.3  Vitals: Temperature 96.8 heart rate  86 blood pressure 137/108 oxygenation 92% on 12 L high flow cannula  General: alert and oriented, appropriate, no acute distress but chronic ill appearance  HENT:  Head is normocephalic, atraumatic, mucous membranes are moist  Eyes: Extraocular movements are intact, no scleral icterus  Neck: Supple without lymphadenopathy or thyromegaly, trachea is in the midline  Respiratory: Lungs are clear bilaterally without wheezing, no use of accessory muscles is noted  Cardiovascular: Regular rate and rhythm without murmur, no peripheral edema  Gastrointestinal: Soft and mildly distended, there is minimal tenderness, there is no peritonitis, there is no guarding.  There is a reducible and nontender periumbilical hernia.  Musculoskeletal: Muscle bulk and strength is decreased but equal bilaterally.  No gross deformity.  Skin: No rash or icterus noted    Laboratory data: White blood cell count increased to 17.9 from 13 yesterday (I believe patient has received steroids) with a left shift.  Hemoglobin 16.4 platelets 285 creatinine 2.0 albumin 3.2    Imaging data: CT images are reviewed by me and discussed with radiology.  He does have a 13 mm kidney stone.  I think most relevant though is fluid collection, probably retroperitoneal lateral to the duodenum and posterior to the colon with an air-fluid level.  There does appear to be a small amount of contrast in it as well.  I suspect this most likely represents a contained duodenal perforation.      Assessment and plan:   -Probable contained duodenal perforation  -Covid positive  -Moderate obesity with body mass index in excess of 40  -Severe congestive heart failure with ejection fraction of 30%  -Type 2 diabetes  -Atrial fibrillation on Eliquis, I have held his Eliquis as of now  -Kidney stones, urology to see  -I discussed the case with the patient's daughter.  I think that percutaneous drainage is the best option.  This does appear to be contained and the patient would be an  extremely poor surgical candidate.  I do not believe he would survive a major laparotomy, and if it is truly a distal second or third portion of the duodenum perforation, surgical options are limited regardless.  I have started IV antibiotics.  We will follow along closely.  The patient's prognosis is guarded.      Dameon Nicole MD, FACS  General, Minimally Invasive and Endoscopic Surgery  [facility]    [address], [address]  [place], [place], [address]  P: [phone]  F: [phone]

## 2022-02-17 NOTE — PROGRESS NOTES
[facility]  [phone]  Travis Giang MD    Subjective:  LOS: 3    Chief Complaint: Respiratory failure    Resuscitated cardiac arrest.  Events in the last 24 hours noted.  Patient was having abdominal distention eventually had a CT abdomen.  Surgery consult was called for abdominal distention.  Apparently having abdominal pain especially in the left lower quadrant.  CT of the abdomen as reported.  However surgery saw a fluid collection retroperitoneal and lateral to the duodenum posterior to the colon.  CT-guided drainage was recommended.  Patient was taken down for CT-guided drain.  In the radiology suite he apparently vomited coffee-ground emesis although.  He subsequently became somnolent and then unresponsive.  He went into cardiopulmonary arrest and was resuscitated.  Upon arrival in the CCU again cardiac arrest and CPR initiated with CODE BLUE note as recorded by me.  Successful resuscitation and now on bicarbonate drip and epinephrine drip.    Objective   Vital Signs past 24hrs    Temp range: Temp (24hrs), Av.8 °F (36.6 °C), Min:96.8 °F (36 °C), Max:98.3 °F (36.8 °C)    BP range: BP: ()/() 109/96  Pulse range: Heart Rate:  [61-90] 80  Resp rate range: Resp:  [16-26] 16    Device (Oxygen Therapy): high-flow nasal cannula; humidifiedFlow (L/min):  [3-13] 13  Oxygen range:SpO2:  [89 %-100 %] 94 %      115 kg (254 lb 8 oz); Body mass index is 42.35 kg/m².  No intake or output data in the 24 hours ending 22 1639    Physical Exam  Constitutional:       Appearance: He is obese.      Interventions: He is intubated and restrained.   Cardiovascular:      Rate and Rhythm: Normal rate and regular rhythm.      Heart sounds: No murmur heard.      Pulmonary:      Effort: He is intubated.      Breath sounds: Decreased breath sounds present.   Abdominal:      General: Bowel sounds are absent. There is distension.   Musculoskeletal:      Comments: Chronic changes to LE's   Neurological:       Mental Status: He is unresponsive.       Results Review:    I have reviewed the laboratory and imaging data since the last note by [facility] physician.  My annotations are noted in assessment and plan.    Results from last 7 days   Lab Units 02/17/22  0558 02/16/22  0346 02/15/22  1005 02/15/22  0048 02/14/22 2047   PROCALCITONIN ng/mL  --   --   --   --  0.06   LACTATE mmol/L  --   --   --  1.9 2.7*   D DIMER QUANT MCGFEU/mL  --   --  0.96*  --   --    CRP mg/dL 2.04* 1.38*  --   --   --        Medication Review:  I have reviewed the current MAR.  My annotations are noted in assessment and plan.    Pharmacy Consult - Remdesivir,       Plan   PCCM Problems  Cardiac arrest 2/17/2022, resuscitated  Acute respiratory failure requiring mechanical ventilation, 2/17/2022  Contained duodenal perforation with intra-abdominal abscess  Status post IR directed drain to above abscess, 2/17/2022  Septic shock  Relevant Medical Problems  SARS-CoV-2 infection  Acute on chronic respiratory failure, hypoxia and hypercapnia  Acute on chronic systolic and diastolic congestive heart failure  Bradycardia  Pulmonary infiltrates consistent with pulmonary edema  Paroxysmal atrial fibrillation on AC  History stroke  Acute kidney injury  Sepsis/cellulitis  Diabetes type 2  Ex-smoker  Suspected COPD  YEISON on CPAP  Morbid obesity  Cardiomyopathy, EF 30%    .    Plan of Treatment    Abdominal pain and distention overnight.  Appreciate general surgery consult and finding of contained duodenal perforation.  Now with IR directed drain.  Started on Zosyn.  Will add fluconazole and vancomycin until ID sees. Initial admission cultures were no growth except for contaminant.    Septic and hypovolemic shock - bicarb drip, pressors. Blood already on order.    SARS-CoV-2 infection and completing remdesivir.  On dexamethasone once daily.  We will keep both on board for now.    Unfortunately large volume aspiration with emesis at the time of drainage placement.   Subsequent cardiac arrest and now on the ventilator.  Prolonged CPR time.  Will need to assess neuro status once he is more stable.    Has a NG tube to low wall suction and close to 2000 mL of coffee-ground material was aspirated.  Will place on a Protonix drip for now. CBC q6hrs.    Chronic respiratory failure requiring noninvasive ventilation.  We were trying to arrange a trilogy machine for him in the hospital to go home with. This will be deferred for now.    Acute on chronic systolic and diastolic CHF.  Was getting diuresis but will be on hold for now.    Paroxysmal afib on AC.  Stop Eliquis due to duodenal perforation.  Will need to reassess for anticoagulation in the future.  Previous significant bradycardia and therefore holding on all beta-blockers.  Currently though it looks like some Coreg is ordered - I will stop.    Acute kidney injury and now at risk of ATN due to cardiac arrest.  Will ask renal to see.    Guarded prognosis.    I spent 60 mins critical care time in care of this patient outside of any procedures.       Travis Giang MD  02/17/22  16:39 EST    While in the room and during my examination of the patient I wore gloves, gown, mask, eye protection and followed enhanced droplet/contact isolation protocol and precautions.  I washed my hands before and after this patient encounter.    Part of this note may be an electronic transcription/translation of spoken language to printed text using the Dragon Dictation System.

## 2022-02-17 NOTE — ED PROVIDER NOTES
I responded to [employer].  Upon my arrival to the [facility] around 1500, the patient had a pulse but was in respiratory distress.  I emergently intubated the patient with a 7.5 ET tube without need for any drugs.  He had copious amount of stomach contents in his oropharynx with associated aspiration.  However, after suctioning him, the patient had improvement in his oxygenation.  He was being packaged to transfer to the ICU.        Patient then developed bradycardia and went into PEA arrest.  Upon arrival, he had already received 1 dose of epinephrine.  He received a few extra rounds of IV epinephrine as well as IV fluid.  He started having copious amount of blood coming from his GI tract that was being suctioned out.  Patient went from PEA arrest into ventricular tachycardia.  He was shocked twice and given IV amiodarone.  We obtained ROSC.  Due to his bradycardia with heart rate in the 20s, we began pacing the patient.  He was then transferred up to the ICU.  I ordered 2 units of packed red blood cells to be delivered at the ICU.    Critical Care  Performed by: Ramsey Davey II, MD  Authorized by: Ramsey Davey II, MD     Critical care provider statement:     Critical care time (minutes):  35    Critical care was necessary to treat or prevent imminent or life-threatening deterioration of the following conditions:  Shock and respiratory failure    Critical care was time spent personally by me on the following activities:  Pulse oximetry, evaluation of patient's response to treatment, examination of patient, re-evaluation of patient's condition, review of old charts and ordering and performing treatments and interventions  Intubation    Date/Time: 2/17/2022 5:36 PM  Performed by: Ramsey Davey II, MD  Authorized by: Ramsey Davey II, MD     Consent:     Consent obtained:  Emergent situation  Pre-procedure details:     Patient status:  Unresponsive  Procedure details:     Preoxygenation:   Bag valve mask    Intubation method:  Oral    Oral intubation technique:  Video-assisted    Laryngoscope size: s4.    Tube size (mm):  7.5    Tube type:  Cuffed    Number of attempts:  1  Placement assessment:     Tube secured with:  ETT larsen    Breath sounds:  Equal    Placement verification: chest rise      CXR findings:  ETT in proper place           Ramsey Davey II, MD  02/17/22 4628

## 2022-02-17 NOTE — NURSING NOTE
Pt admission questions are not completed prior to this RN receiving this Pt. Pt is confused and can not answer any question or give any information at this time. MARLON

## 2022-02-17 NOTE — CASE MANAGEMENT/SOCIAL WORK
Continued Stay Note  [facility]     Patient Name: Jesus Izaguirre Jr.  MRN: [id number]  Today's Date: 2/17/2022    Admit Date: 2/14/2022     Discharge Plan     Row Name 02/17/22 0804       Plan    Plan Comments Patient on 12 L oxygen. CCP will continue to follow and assist with DC planning once more stable. Guerline Cee RN CCP               Discharge Codes    No documentation.               Expected Discharge Date and Time     Expected Discharge Date Expected Discharge Time    Feb 22, 2022             Guerline Cee RN

## 2022-02-17 NOTE — NURSING NOTE
Got verbal orders for Pt Restraints from Dr. Giang. Called Pt Son Paco Izaguirre and updated him on the status of pt and having to put Pt in restraints due to Pt pulling off High Flow Oxygen Tubing and tele monitor and trying to get out of bed multiply times. Pt son Paco understands. This RN answered all question Paco Had at this time. TM

## 2022-02-17 NOTE — CASE MANAGEMENT/SOCIAL WORK
Continued Stay Note  [facility]     Patient Name: Jesus Izaguirre Jr.  MRN: [id number]  Today's Date: 2/17/2022    Admit Date: 2/14/2022     Discharge Plan     Row Name 02/17/22 1402       Plan    Plan Comments Message sent to Nguyen at [place] regarding referral to update her the patient has moved out of the unit. Guerline Cee RN CCP               Discharge Codes    No documentation.               Expected Discharge Date and Time     Expected Discharge Date Expected Discharge Time    Feb 22, 2022             Guerline Cee RN

## 2022-02-17 NOTE — PROGRESS NOTES
"Patient Care Team:  Jason Joya APRN as PCP - General (Family Medicine)    Chief Complaint: Follow-up acute systolic heart failure, COVID-19 pneumonia    Interval History:   On high flow oxygen. Abdominal distention. CT of abdomen yesterday with no ileus. General surgery consult pending.    Objective   Vital Signs  Temp:  [96.8 °F (36 °C)-98.4 °F (36.9 °C)] 96.8 °F (36 °C)  Heart Rate:  [38-86] 86  Resp:  [24-26] 24  BP: (100-178)/() 137/108  No intake or output data in the 24 hours ending 02/17/22 0904  Flowsheet Rows      First Filed Value   Admission Height 180.3 cm (71\") Documented at 02/14/2022 2042   Admission Weight 116 kg (255 lb 8.2 oz) Documented at 02/15/2022 0038          Temp:  [96.8 °F (36 °C)-98.4 °F (36.9 °C)] 96.8 °F (36 °C)  Heart Rate:  [38-86] 86  Resp:  [24-26] 24  BP: (100-178)/() 137/108  No intake or output data in the 24 hours ending 02/17/22 0904  Flowsheet Rows      First Filed Value   Admission Height 180.3 cm (71\") Documented at 02/14/2022 2042   Admission Weight 116 kg (255 lb 8.2 oz) Documented at 02/15/2022 0038          General Appearance:    Alert, cooperative, in no acute distress   Head:    Normocephalic, without obvious abnormality, atraumatic       Neck/Lymph   No adenopathy, supple, no thyromegaly, no carotid bruit, no    JVD   Lungs:     Clear to auscultation bilaterally, no wheezes, rales, or     rhonchi    Cardiac:    Normal rate, regular rhythm, no murmur, no rub, no gallop   Chest Wall:    No abnormalities observed   GI:     Normal bowel sounds, soft, nontender, nondistended,            no rebound tenderness   Extremities:   No cyanosis, clubbing.  Trace bilateral lower extremity edema   Circulatory/Peripheral Vascular :   Pulses palpable and equal bilaterally   Integumentary:  Venous stasis changes bilateral lower extremities       Neurologic:   Cranial nerves 2 - 12 grossly intact, sensation intact              dexamethasone, 6 mg, Intravenous, " Daily  insulin regular, 0-14 Units, Subcutaneous, Q6H  piperacillin-tazobactam, 3.375 g, Intravenous, Q6H  remdesivir, 100 mg, Intravenous, Q24H  sodium chloride, 10 mL, Intravenous, Q12H        Pharmacy Consult - Remdesivir,         Results Review:    Results from last 7 days   Lab Units 02/17/22  0558   SODIUM mmol/L 143   POTASSIUM mmol/L 4.1   CHLORIDE mmol/L 102   CO2 mmol/L 31.4*   BUN mg/dL 48*   CREATININE mg/dL 2.02*   GLUCOSE mg/dL 237*   CALCIUM mg/dL 9.5     Results from last 7 days   Lab Units 02/14/22  2047   TROPONIN T ng/mL <0.010     Results from last 7 days   Lab Units 02/17/22  0558   WBC 10*3/mm3 17.91*   HEMOGLOBIN g/dL 16.4   HEMATOCRIT % 50.4   PLATELETS 10*3/mm3 285     Results from last 7 days   Lab Units 02/14/22  2047   INR  1.03         Results from last 7 days   Lab Units 02/15/22  0301   MAGNESIUM mg/dL 2.1         @LABRCNT(bnp)@  I reviewed the patient's new clinical results.  I personally viewed and interpreted the patient's EKG/Telemetry data        1/17/22  · The quality of the study is limited due to patient body habitus, patient positioning and patient being intubated.  · The right ventricular cavity is borderline dilated.  · Mildly reduced right ventricular systolic function noted.  · The left ventricular cavity is mildly dilated.  · Estimated left ventricular EF = 30% Left ventricular systolic function is moderately decreased.  · There is left ventricular global hypokinesis noted.  · The following left ventricular wall segments are hypokinetic: mid anterior, apical anterior, basal anterolateral, mid anterolateral, apical lateral, basal inferolateral, mid inferolateral, apical inferior, mid inferior, apical septal, basal inferoseptal, mid inferoseptal, apex hypokinetic, mid anteroseptal, basal anterior, basal inferior and basal inferoseptal.  · Left atrial volume is mildly increased.  · Left ventricular diastolic function is consistent with (grade I) impaired  relaxation      Assessment/Plan   1.  Acute hypoxemic respiratory failure  2.  Acute on chronic systolic heart failure  3.  Encephalopathy: Resolved  4.  COVID-19 pneumonia  5.  Bradycardia: No indication for pacemaker  6. Acute kidney injury    -Continue supportive measures for COVID-19 pneumonia  -I think we dryed him out a little too much. I will give him 250 mL of normal saline slowly. Hold off on any additional diuresis.  -Initiate goal-directed medical therapy for heart failure. Carvedilol be started today. Will be unable to tolerate ACE inhibitor at this time secondary to his renal insufficiency. Perhaps this can be added in the future if his creatinine improves.

## 2022-02-17 NOTE — PROGRESS NOTES
Patient reportedly having abdominal distention due to which having some respiratory distress associated with it.  CT abdomen reviewed with no evidence of ileus as such.  There was some kidney stones noted pain medications given.  Urology has been consulted for kidney stones.  I will also consult general surgery for abdominal distention he may need an NG tube if no improvement.  Currently requiring high flow nasal cannula oxygen and needs to be watched closely.

## 2022-02-17 NOTE — PROGRESS NOTES
"[facility] Clinical Pharmacy Services: Vancomycin Pharmacokinetic Initial Consult Note    Jesus Izaguirre Jr. is a 76 y.o. male who is on day 1/7 of pharmacy to dose vancomycin.    Indication: Intra-abdominal infection  Consulting Provider: Dr. Giang  Planned Duration of Therapy: 7 days  Loading Dose Ordered or Given: 2250 mg ordered to be given now  MRSA PCR performed: No  Culture/Source: Retroperitoneum fluid Cx-pending  Target: Dose by Levels-15-20 mcg/mL  Other Antimicrobials: Zosyn 3.375g IV q8h, fluconazole 400 mg daily    Vitals/Labs  Ht: 165.1 cm (65\"); Wt: 115 kg (254 lb 8 oz)    Temp Readings from Last 1 Encounters:   02/17/22 98.3 °F (36.8 °C) (Axillary)      Estimated Creatinine Clearance: 36.5 mL/min (A) (by C-G formula based on SCr of 2.02 mg/dL (H)).    Results from last 7 days   Lab Units 02/17/22  0558 02/16/22  0346 02/15/22  0301 02/15/22  0048 02/14/22  2047   CREATININE mg/dL 2.02*  --  1.33* 1.35*  --    WBC 10*3/mm3 17.91* 13.33* 15.97*  --    < >    < > = values in this interval not displayed.     Assessment/Plan:    Vancomycin Dose: Intermittent dosing  Random level should be at goal level of 15-20 mcg/mL to be re-dosed  Vanc Random has been ordered for tomorrow morning 2/18 with am labs    Pharmacy will follow patient's kidney function and will adjust doses and obtain levels as necessary. Thank you for involving pharmacy in this patient's care. Please contact pharmacy with any questions or concerns.                           Lianet Varghese, Pharm.D., [facility]   Clinical Pharmacist   "

## 2022-02-17 NOTE — ANESTHESIA PREPROCEDURE EVALUATION
Anesthesia Evaluation     Patient summary reviewed and Nursing notes reviewed   no history of anesthetic complications:  NPO Solid Status: > 8 hours  NPO Liquid Status: > 2 hours           Airway   Dental      Pulmonary    (+) a smoker Former, asthma,sleep apnea,   (-) COPD, lung cancer    ROS comment: Currently CoVid positive.  Cardiovascular   Exercise tolerance: poor (<4 METS)    ECG reviewed    (+) hypertension well controlled 2 medications or greater, dysrhythmias Paroxysmal Atrial Fib, PVC, CHF Diastolic >=55% and Systolic <55%, PVD, hyperlipidemia,   (-) valvular problems/murmurs, past MI, CAD, angina, cardiac stents    ROS comment: Longstanding history of CAD w/ NICM, known EF 30%.    TTE 01/2022:  ·The quality of the study is limited due to patient body habitus, patient positioning and patient being intubated.  ·The right ventricular cavity is borderline dilated.  ·Mildly reduced right ventricular systolic function noted.  ·The left ventricular cavity is mildly dilated.  ·Estimated left ventricular EF = 30% Left ventricular systolic function is moderately decreased.  ·There is left ventricular global hypokinesis noted.  ·The following left ventricular wall segments are hypokinetic: mid anterior, apical anterior, basal anterolateral, mid anterolateral, apical lateral, basal inferolateral, mid inferolateral, apical inferior, mid inferior, apical septal, basal inferoseptal, mid inferoseptal, apex hypokinetic, mid anteroseptal, basal anterior, basal inferior and basal inferoseptal.  ·Left atrial volume is mildly increased.  ·Left ventricular diastolic function is consistent with (grade I) impaired relaxation.        Neuro/Psych  (+) CVA residual symptoms, numbness,    (-) seizures, TIA  GI/Hepatic/Renal/Endo    (+) morbid obesity,  renal disease stones, diabetes mellitus type 2 poorly controlled,   (-) hiatal hernia, GERD, PUD, hepatitis, liver disease, GI bleed, no thyroid disorder    Musculoskeletal (-)  negative ROS    Abdominal    Substance History - negative use     OB/GYN negative ob/gyn ROS         Other - negative ROS                     Anesthesia Plan    (Pt was to come straight to the OR from CCU for cysto given CoVid status.  It is brought to our attention that this pt coded twice in the CT scanner this afternoon following aspiration.  Pt was taken back to ICU emergently.  Case to be discussed w/ Uro but is to be cancelled at this time.)          CODE STATUS:    Code Status (Patient has no pulse and is not breathing): CPR (Attempt to Resuscitate)  Medical Interventions (Patient has pulse or is breathing): Full Support

## 2022-02-18 NOTE — PAYOR COMM NOTE
"Jesus Mcnamara Jr. (Dcsd. Male)     PLEASE NOTE PT  ON 22    REF#[id number]    THANK YOU    SABINO CERVANTES LPN CCP            Date of Birth Social Security Number Address Home Phone MRN    1945  [address] [facility] [address] [phone] [id number]    Mandaen Marital Status             None        Admission Date Admission Type Admitting Provider Attending Provider Department, Room/Bed    22 Emergency Olimpia Peña MD  [facility] CORONARY CARE, N326/1    Discharge Date Discharge Disposition Discharge Destination          2022               Attending Provider: (none)   Allergies: No Known Allergies    Isolation: Enh Drop/Con   Infection: COVID (confirmed) (22)   Code Status: Prior   Advance Care Planning Activity    Ht: 165.1 cm (65\")   Wt: 115 kg (254 lb 8 oz)    Admission Cmt: None   Principal Problem: None                Active Insurance as of 2022     Primary Coverage     Payor Plan Insurance Group Employer/Plan Group    AETNA MEDICARE REPLACEMENT AETNA MEDICARE REPLACEMENT [id number]     Payor Plan Address Payor Plan Phone Number Payor Plan Fax Number Effective Dates    [address] [phone]  2021 - None Entered    [facility] [id number]       Subscriber Name Subscriber Birth Date Member ID       JESUS MCNAMARA JR. 1945 [id number]                 Emergency Contacts      (Rel.) Home Phone Work Phone Mobile Phone    mylene mcnamara (Son) [phone] -- [phone]    Lisa Mcnamara (Daughter) [phone] -- [phone]    DmitriyJose (Son) -- -- --            "

## 2022-02-18 NOTE — NURSING NOTE
[facility] called ruled out for organ donation, call back with cardiac time of death for case ID, will work up for tissue donation at that time.

## 2022-02-18 NOTE — NURSING NOTE
Palliative measures initiated and completed. Family at bedside. Pt  at .  Pt navigation expiration completed.     NT, RN

## 2022-02-18 NOTE — DISCHARGE SUMMARY
Date of Admission: 2/14/2022  Date of Discharge:  2/18/2022    Discharge Diagnosis:    Cardiac arrest 2/17/2022, resuscitated  Acute respiratory failure requiring mechanical ventilation, 2/17/2022  Contained duodenal perforation with intra-abdominal abscess  Status post IR directed drain to above abscess, 2/17/2022  Septic shock  Relevant Medical Problems  SARS-CoV-2 infection  Acute on chronic respiratory failure, hypoxia and hypercapnia  Acute on chronic systolic and diastolic congestive heart failure  Bradycardia  Pulmonary infiltrates consistent with pulmonary edema  Paroxysmal atrial fibrillation on AC  History stroke  Acute kidney injury  Sepsis/cellulitis  Diabetes type 2  Ex-smoker  Suspected COPD  YEISON on CPAP  Morbid obesity  Cardiomyopathy, EF 30%    Presenting Problem/History of Present Illness    76-year-old gentleman recently discharged around 3 weeks ago from the hospital after being admitted with acute hypoxemic hypercapnic respiratory failure with combined systolic and diastolic CHF.  Patient has known history of cardiomyopathy EF 30%.  Also has underlying history of A. fib on anticoagulation.  Patient now presented from home with altered mental status difficult to arouse.  He was noted to have sats in the 60s in the emergency room on admission and was immediately placed on noninvasive ventilation/BiPAP.  Currently on noninvasive ventilation and somewhat confused unable to give me any meaningful history.  There is some history of noncompliance with his medication and home CPAP.  Patient also tested positive for COVID-19 in the emergency room.  There is no history of fall or aspiration as such.      Hospital Course    2/14/2022 admitted to the ICU for congestive heart failure COVID-19 respiratory failure.  Initiated Decadron, bronchodiolators and remdesivir. Lactic acid elevated continue to trend.  Started on broad-spectrum antibiotics questionable concerns of cellulitis.  2/15/2022 cardiology was  consulted. It was planned to switch the patient to a trilogy, to follow the patient home for chronic respiratory failure.  At that time IV diuretics were held due to elevated creatinine.    Overnight, patient complained of abdominal distention associated with some respiratory distress.  CT abdomen pelvis was ordered and completed.  2022 urology was consulted for kidney stones.  General surgery was consulted for abdominal distention. General Surgery recommended a percutaneous drainage for the free fluid collection around the patient's duodenum, poor surgical candidate and this would be the minimally invasive option.  IV antibiotics and held anticoagulation. Urology, Dr. Flores, planned for a left ureteral stent.  Patient was transported down to CT for guided drainage of the fluid in his abdomen.  Patient vomited, aspirated, coded due to cardiopulmonary arrest.  The initial rhythm was a PEA arrest.  ROSC was achieved  Patient was transferred back to the CCU, again had a cardiac arrest.  Again successful resuscitation.  Maintained on bicarb and epinephrine drip.  CT-guided drain was successful and drain was in place.  NG tube placed to low wall suction for large amounts of coffee-ground material.  Protonix drip started for now.  Family came in and poor prognosis explained especially given multiple medical issues at baseline. They elected to extubate with palliative intent. Patient  as below.    Condition on Discharge:   : Date and Time of Death: 2022 at 8:04 PM       Travis Giang MD  22  07:14 EST    Time: I spent UNDER 30mins in the discharge planning of this patient.    Some of this encounter note is an electronic transcription/translation of spoken language to printed text.

## 2022-02-19 LAB
BH BB BLOOD EXPIRATION DATE: NORMAL
BH BB BLOOD EXPIRATION DATE: NORMAL
BH BB BLOOD TYPE BARCODE: 9500
BH BB BLOOD TYPE BARCODE: 9500
BH BB DISPENSE STATUS: NORMAL
BH BB DISPENSE STATUS: NORMAL
BH BB PRODUCT CODE: NORMAL
BH BB PRODUCT CODE: NORMAL
BH BB UNIT NUMBER: NORMAL
BH BB UNIT NUMBER: NORMAL
CROSSMATCH INTERPRETATION: NORMAL
CROSSMATCH INTERPRETATION: NORMAL
UNIT  ABO: NORMAL
UNIT  ABO: NORMAL
UNIT  RH: NORMAL
UNIT  RH: NORMAL

## 2022-02-20 LAB
BACTERIA FLD CULT: ABNORMAL
BACTERIA SPEC AEROBE CULT: NORMAL
GRAM STN SPEC: ABNORMAL
GRAM STN SPEC: ABNORMAL

## 2022-02-22 LAB — BACTERIA SPEC ANAEROBE CULT: NORMAL

## 2024-05-13 NOTE — THERAPY TREATMENT NOTE
Outpatient Physical Therapy Ortho Treatment Note  [facility]     Patient Name: Jesus Izaguirre Jr.  : 1945  MRN: [id number]  Today's Date: 2019      Visit Date: 2019    Visit Dx:    ICD-10-CM ICD-9-CM   1. Left shoulder pain, unspecified chronicity M25.512 719.41   2. Tear of left rotator cuff, unspecified tear extent M75.102 840.4       Patient Active Problem List   Diagnosis   • Chronic ischemic right MCA stroke   • Acute bronchitis   • AB (asthmatic bronchitis)   • BPH (benign prostatic hyperplasia)   • Bradycardia   • Cough   • Dribbling from mouth   • Fatigue   • Febrile   • Gout   • Hyperlipidemia   • Hypertension   •  flu   • Obstructive sleep apnea syndrome   • Breath shortness   • Type 2 diabetes mellitus with diabetic polyneuropathy, without long-term current use of insulin (CMS/HCC)   • Vitamin B12 deficiency   • Vitamin D deficiency   • History of lithotripsy   • Calculus of kidney   • Onychia of finger   • Paronychia of finger   • Venous stasis   • Memory impairment   • Neuropathy   • Acute pain of right knee   • Atrial fibrillation (CMS/HCC)   • Tobacco abuse   • Class 2 obesity in adult        Past Medical History:   Diagnosis Date   • Asthma    • BPH (benign prostatic hypertrophy)    • Class 2 obesity in adult 2019   • Diabetes mellitus (CMS/HCC)    • Fatigue    • HLD (hyperlipidemia) 2016   • Hypertension    • Nephrolithiasis    • Stroke (CMS/HCC) 2015   • Tobacco abuse 3/24/2018   • Type 2 diabetes mellitus with diabetic polyneuropathy, without long-term current use of insulin (CMS/HCC)    • Venous insufficiency         Past Surgical History:   Procedure Laterality Date   • LITHOTRIPSY      Renal       PT Ortho     Row Name 19 1500       Subjective Comments    Subjective Comments  Pain with supine flex with 2 lbs ex so held.  -SI       Subjective Pain    Able to rate subjective pain?  yes  -SI    Subjective Pain Comment  sore the day after PT  -SI        Right Upper Ext    Rt Shoulder Abduction PROM  160  -SI    Rt Shoulder Flexion PROM  160  -SI    Rt Shoulder External Rotation PROM  77  -SI    Rt Shoulder Internal Rotation PROM  75  -SI    Row Name 04/24/19 1200       Subjective Comments    Subjective Comments  Exercises are easy...  -SI       Subjective Pain    Able to rate subjective pain?  yes  -SI    Subjective Pain Comment  Shoulder pain at night  -SI      User Key  (r) = Recorded By, (t) = Taken By, (c) = Cosigned By    Initials Name Provider Type    Janice Rendon PTA Physical Therapy Assistant                      PT Assessment/Plan     Row Name 04/26/19 1620          PT Assessment    Assessment Comments  Sore day after PT, and with the sh press up ex, so not to use wt, do active only.  Increase PROM measured.  -SI       User Key  (r) = Recorded By, (t) = Taken By, (c) = Cosigned By    Initials Name Provider Type    Janice Rendon PTA Physical Therapy Assistant            Exercises     Row Name 04/26/19 1500             Subjective Comments    Subjective Comments  Pain with supine flex with 2 lbs ex so held.  -SI         Subjective Pain    Able to rate subjective pain?  yes  -SI      Subjective Pain Comment  sore the day after PT  -SI         Total Minutes    55183 - PT Therapeutic Exercise Minutes  30  -SI      15106 - PT Manual Therapy Minutes  15  -SI         Exercise 1    Exercise Name 1  shoulder rolls and scap ret  -SI      Sets 1  1  -SI      Reps 1  10  -SI         Exercise 2    Exercise Name 2  AAROM ER w/cane  -SI      Sets 2  1  -SI      Reps 2  10  -SI      Time 2  10 sec  -SI         Exercise 3    Exercise Name 3  supine press ups   -SI      Sets 3  2  -SI      Reps 3  10  -SI      Additional Comments  active  -SI         Exercise 4    Exercise Name 4  table slides, thumb up  -SI      Sets 4  1  -SI      Reps 4  10  -SI      Time 4  5-10 sec  -SI         Exercise 5    Exercise Name 5  UBE  -SI      Time 5  4 min  -SI          Exercise 6    Exercise Name 6  supine 2 lbs, trace letters of alphabet with arm 90 degrees holding 2 lbs  -SI      Sets 6  1  -SI         Exercise 7    Exercise Name 7  scap retraction and sh ext with green TB  -SI      Sets 7  2  -SI      Reps 7  10  -SI         Exercise 8    Exercise Name 8  IR and ER with yellow TB  -SI      Sets 8  2  -SI      Reps 8  10  -SI         Exercise 9    Exercise Name 9  wall slide with eccentric lower   -SI      Reps 9  5  -SI      Additional Comments  in PT only  -SI        User Key  (r) = Recorded By, (t) = Taken By, (c) = Cosigned By    Initials Name Provider Type    Janice Rendon PTA Physical Therapy Assistant                      Manual Rx (last 36 hours)      Manual Treatments     Row Name 04/26/19 1500             Total Minutes    19151 - PT Manual Therapy Minutes  15  -SI         Manual Rx 1    Manual Rx 1 Location  left shoulder  -SI      Manual Rx 1 Type  PROM  -SI      Manual Rx 1 Duration  15  -SI        User Key  (r) = Recorded By, (t) = Taken By, (c) = Cosigned By    Initials Name Provider Type    Janice Rendon PTA Physical Therapy Assistant              Therapy Education  Given: HEP, Symptoms/condition management(Sh anatomy and function of RC discussed)  Program: Progressed  How Provided: Verbal, Demonstration, Written  Provided to: Patient  Level of Understanding: Teach back education performed              Time Calculation:   Start Time: 1540  Stop Time: 1621  Time Calculation (min): 41 min  Total Timed Code Minutes- PT: 41 minute(s)  Therapy Charges for Today     Code Description Service Date Service Provider Modifiers Qty    10788796819 HC PT THER PROC EA 15 MIN 4/26/2019 Janice Ramsay PTA GP 2    87069283234 HC PT MANUAL THERAPY EA 15 MIN 4/26/2019 Janice Ramsay PTA GP 1                    Janice Ramsay PTA  4/26/2019      Followed protocol
